# Patient Record
Sex: FEMALE | Race: OTHER | HISPANIC OR LATINO
[De-identification: names, ages, dates, MRNs, and addresses within clinical notes are randomized per-mention and may not be internally consistent; named-entity substitution may affect disease eponyms.]

---

## 2017-01-24 ENCOUNTER — RESULT REVIEW (OUTPATIENT)
Age: 27
End: 2017-01-24

## 2017-01-25 ENCOUNTER — APPOINTMENT (OUTPATIENT)
Dept: OBGYN | Facility: CLINIC | Age: 27
End: 2017-01-25

## 2017-01-25 ENCOUNTER — OUTPATIENT (OUTPATIENT)
Dept: OUTPATIENT SERVICES | Facility: HOSPITAL | Age: 27
LOS: 1 days | End: 2017-01-25
Payer: MEDICAID

## 2017-01-25 VITALS — WEIGHT: 183.5 LBS | DIASTOLIC BLOOD PRESSURE: 82 MMHG | SYSTOLIC BLOOD PRESSURE: 120 MMHG

## 2017-01-25 DIAGNOSIS — R11.0 NAUSEA: ICD-10-CM

## 2017-01-25 DIAGNOSIS — Z90.49 ACQUIRED ABSENCE OF OTHER SPECIFIED PARTS OF DIGESTIVE TRACT: Chronic | ICD-10-CM

## 2017-01-25 DIAGNOSIS — N76.0 ACUTE VAGINITIS: ICD-10-CM

## 2017-01-25 PROCEDURE — 88141 CYTOPATH C/V INTERPRET: CPT

## 2017-01-26 ENCOUNTER — LABORATORY RESULT (OUTPATIENT)
Age: 27
End: 2017-01-26

## 2017-01-30 ENCOUNTER — LABORATORY RESULT (OUTPATIENT)
Age: 27
End: 2017-01-30

## 2017-01-30 PROCEDURE — 88175 CYTOPATH C/V AUTO FLUID REDO: CPT

## 2017-01-30 PROCEDURE — 87624 HPV HI-RISK TYP POOLED RSLT: CPT

## 2017-01-30 PROCEDURE — 87086 URINE CULTURE/COLONY COUNT: CPT

## 2017-01-30 PROCEDURE — 87186 SC STD MICRODIL/AGAR DIL: CPT

## 2017-01-30 PROCEDURE — G0463: CPT

## 2017-02-13 ENCOUNTER — APPOINTMENT (OUTPATIENT)
Dept: OBGYN | Facility: CLINIC | Age: 27
End: 2017-02-13

## 2017-02-23 ENCOUNTER — EMERGENCY (EMERGENCY)
Facility: HOSPITAL | Age: 27
LOS: 1 days | Discharge: HOME | End: 2017-02-23
Attending: EMERGENCY MEDICINE | Admitting: EMERGENCY MEDICINE
Payer: SELF-PAY

## 2017-02-23 VITALS
TEMPERATURE: 100 F | OXYGEN SATURATION: 99 % | DIASTOLIC BLOOD PRESSURE: 64 MMHG | HEART RATE: 126 BPM | SYSTOLIC BLOOD PRESSURE: 94 MMHG | RESPIRATION RATE: 20 BRPM

## 2017-02-23 VITALS
DIASTOLIC BLOOD PRESSURE: 55 MMHG | HEART RATE: 95 BPM | OXYGEN SATURATION: 100 % | RESPIRATION RATE: 18 BRPM | TEMPERATURE: 99 F | SYSTOLIC BLOOD PRESSURE: 108 MMHG

## 2017-02-23 DIAGNOSIS — Z90.49 ACQUIRED ABSENCE OF OTHER SPECIFIED PARTS OF DIGESTIVE TRACT: Chronic | ICD-10-CM

## 2017-02-23 DIAGNOSIS — R10.13 EPIGASTRIC PAIN: ICD-10-CM

## 2017-02-23 LAB
ALBUMIN SERPL ELPH-MCNC: 4.6 G/DL — SIGNIFICANT CHANGE UP (ref 3.3–5)
ALP SERPL-CCNC: 157 U/L — HIGH (ref 40–120)
ALT FLD-CCNC: 34 U/L RC — SIGNIFICANT CHANGE UP (ref 10–45)
ANION GAP SERPL CALC-SCNC: 18 MMOL/L — HIGH (ref 5–17)
APPEARANCE UR: CLEAR — SIGNIFICANT CHANGE UP
AST SERPL-CCNC: 29 U/L — SIGNIFICANT CHANGE UP (ref 10–40)
BASOPHILS # BLD AUTO: 0 K/UL — SIGNIFICANT CHANGE UP (ref 0–0.2)
BASOPHILS NFR BLD AUTO: 0.1 % — SIGNIFICANT CHANGE UP (ref 0–2)
BILIRUB SERPL-MCNC: 0.7 MG/DL — SIGNIFICANT CHANGE UP (ref 0.2–1.2)
BILIRUB UR-MCNC: NEGATIVE — SIGNIFICANT CHANGE UP
BUN SERPL-MCNC: 9 MG/DL — SIGNIFICANT CHANGE UP (ref 7–23)
CALCIUM SERPL-MCNC: 9.4 MG/DL — SIGNIFICANT CHANGE UP (ref 8.4–10.5)
CHLORIDE SERPL-SCNC: 99 MMOL/L — SIGNIFICANT CHANGE UP (ref 96–108)
CO2 SERPL-SCNC: 23 MMOL/L — SIGNIFICANT CHANGE UP (ref 22–31)
COLOR SPEC: YELLOW — SIGNIFICANT CHANGE UP
CREAT SERPL-MCNC: 0.79 MG/DL — SIGNIFICANT CHANGE UP (ref 0.5–1.3)
DIFF PNL FLD: NEGATIVE — SIGNIFICANT CHANGE UP
EOSINOPHIL # BLD AUTO: 0 K/UL — SIGNIFICANT CHANGE UP (ref 0–0.5)
EOSINOPHIL NFR BLD AUTO: 0 % — SIGNIFICANT CHANGE UP (ref 0–6)
GAS PNL BLDV: SIGNIFICANT CHANGE UP
GLUCOSE SERPL-MCNC: 111 MG/DL — HIGH (ref 70–99)
GLUCOSE UR QL: NEGATIVE — SIGNIFICANT CHANGE UP
HCT VFR BLD CALC: 38 % — SIGNIFICANT CHANGE UP (ref 34.5–45)
HGB BLD-MCNC: 12.9 G/DL — SIGNIFICANT CHANGE UP (ref 11.5–15.5)
KETONES UR-MCNC: ABNORMAL
LEUKOCYTE ESTERASE UR-ACNC: NEGATIVE — SIGNIFICANT CHANGE UP
LIDOCAIN IGE QN: 18 U/L — SIGNIFICANT CHANGE UP (ref 7–60)
LYMPHOCYTES # BLD AUTO: 1.8 K/UL — SIGNIFICANT CHANGE UP (ref 1–3.3)
LYMPHOCYTES # BLD AUTO: 11.1 % — LOW (ref 13–44)
MCHC RBC-ENTMCNC: 26.7 PG — LOW (ref 27–34)
MCHC RBC-ENTMCNC: 33.9 GM/DL — SIGNIFICANT CHANGE UP (ref 32–36)
MCV RBC AUTO: 78.7 FL — LOW (ref 80–100)
MONOCYTES # BLD AUTO: 1 K/UL — HIGH (ref 0–0.9)
MONOCYTES NFR BLD AUTO: 6 % — SIGNIFICANT CHANGE UP (ref 2–14)
NEUTROPHILS # BLD AUTO: 13.5 K/UL — HIGH (ref 1.8–7.4)
NEUTROPHILS NFR BLD AUTO: 82.8 % — HIGH (ref 43–77)
NITRITE UR-MCNC: NEGATIVE — SIGNIFICANT CHANGE UP
PH UR: 8.5 — HIGH (ref 4.8–8)
PLATELET # BLD AUTO: 206 K/UL — SIGNIFICANT CHANGE UP (ref 150–400)
POTASSIUM SERPL-MCNC: 3.9 MMOL/L — SIGNIFICANT CHANGE UP (ref 3.5–5.3)
POTASSIUM SERPL-SCNC: 3.9 MMOL/L — SIGNIFICANT CHANGE UP (ref 3.5–5.3)
PROT SERPL-MCNC: 8.6 G/DL — HIGH (ref 6–8.3)
PROT UR-MCNC: 30 MG/DL
RBC # BLD: 4.83 M/UL — SIGNIFICANT CHANGE UP (ref 3.8–5.2)
RBC # FLD: 12.2 % — SIGNIFICANT CHANGE UP (ref 10.3–14.5)
SODIUM SERPL-SCNC: 140 MMOL/L — SIGNIFICANT CHANGE UP (ref 135–145)
SP GR SPEC: 1.02 — SIGNIFICANT CHANGE UP (ref 1.01–1.02)
UROBILINOGEN FLD QL: NEGATIVE — SIGNIFICANT CHANGE UP
WBC # BLD: 16.2 K/UL — HIGH (ref 3.8–10.5)
WBC # FLD AUTO: 16.2 K/UL — HIGH (ref 3.8–10.5)

## 2017-02-23 PROCEDURE — 99284 EMERGENCY DEPT VISIT MOD MDM: CPT | Mod: 25

## 2017-02-23 PROCEDURE — 82435 ASSAY OF BLOOD CHLORIDE: CPT

## 2017-02-23 PROCEDURE — 80053 COMPREHEN METABOLIC PANEL: CPT

## 2017-02-23 PROCEDURE — 84132 ASSAY OF SERUM POTASSIUM: CPT

## 2017-02-23 PROCEDURE — 84295 ASSAY OF SERUM SODIUM: CPT

## 2017-02-23 PROCEDURE — 81001 URINALYSIS AUTO W/SCOPE: CPT

## 2017-02-23 PROCEDURE — 96374 THER/PROPH/DIAG INJ IV PUSH: CPT

## 2017-02-23 PROCEDURE — 82803 BLOOD GASES ANY COMBINATION: CPT

## 2017-02-23 PROCEDURE — 82565 ASSAY OF CREATININE: CPT

## 2017-02-23 PROCEDURE — 87086 URINE CULTURE/COLONY COUNT: CPT

## 2017-02-23 PROCEDURE — 81025 URINE PREGNANCY TEST: CPT

## 2017-02-23 PROCEDURE — 96375 TX/PRO/DX INJ NEW DRUG ADDON: CPT

## 2017-02-23 PROCEDURE — 85014 HEMATOCRIT: CPT

## 2017-02-23 PROCEDURE — 82330 ASSAY OF CALCIUM: CPT

## 2017-02-23 PROCEDURE — 82947 ASSAY GLUCOSE BLOOD QUANT: CPT

## 2017-02-23 PROCEDURE — 85027 COMPLETE CBC AUTOMATED: CPT

## 2017-02-23 PROCEDURE — 83690 ASSAY OF LIPASE: CPT

## 2017-02-23 PROCEDURE — 99053 MED SERV 10PM-8AM 24 HR FAC: CPT

## 2017-02-23 PROCEDURE — 83605 ASSAY OF LACTIC ACID: CPT

## 2017-02-23 RX ORDER — ONDANSETRON 8 MG/1
4 TABLET, FILM COATED ORAL ONCE
Qty: 0 | Refills: 0 | Status: COMPLETED | OUTPATIENT
Start: 2017-02-23 | End: 2017-02-23

## 2017-02-23 RX ORDER — SODIUM CHLORIDE 9 MG/ML
1000 INJECTION INTRAMUSCULAR; INTRAVENOUS; SUBCUTANEOUS ONCE
Qty: 0 | Refills: 0 | Status: COMPLETED | OUTPATIENT
Start: 2017-02-23 | End: 2017-02-23

## 2017-02-23 RX ORDER — ACETAMINOPHEN 500 MG
1000 TABLET ORAL ONCE
Qty: 0 | Refills: 0 | Status: COMPLETED | OUTPATIENT
Start: 2017-02-23 | End: 2017-02-23

## 2017-02-23 RX ORDER — ONDANSETRON 8 MG/1
1 TABLET, FILM COATED ORAL
Qty: 12 | Refills: 0 | OUTPATIENT
Start: 2017-02-23 | End: 2017-02-26

## 2017-02-23 RX ADMIN — SODIUM CHLORIDE 2000 MILLILITER(S): 9 INJECTION INTRAMUSCULAR; INTRAVENOUS; SUBCUTANEOUS at 06:36

## 2017-02-23 RX ADMIN — ONDANSETRON 4 MILLIGRAM(S): 8 TABLET, FILM COATED ORAL at 06:36

## 2017-02-23 RX ADMIN — SODIUM CHLORIDE 2000 MILLILITER(S): 9 INJECTION INTRAMUSCULAR; INTRAVENOUS; SUBCUTANEOUS at 07:48

## 2017-02-23 RX ADMIN — Medication 400 MILLIGRAM(S): at 06:35

## 2017-02-23 NOTE — ED PROVIDER NOTE - ATTENDING CONTRIBUTION TO CARE
26yoF with fever, diarrhea, n/v. Some L flank pain.   On exam. no CVA ttp. No abdominal tenderness, no guarding, no rigidity. soft.   A: n/v/diarrhea - likely AGE. tachycardia, r/o Pyelo  Labs, fluids, recheck HR, encourage PO. UA, Uhcg.  Clinical concern for appendicitis or other intraabdominal pathology low as no abdominal ttp or peritoneal signs. Although these are still possible especially if early, risks of testing outweigh benefits at this time. Pt educated on return precautions. 26yoF with fever, diarrhea, n/v. Some L flank pain.   On exam. no CVA ttp. No abdominal tenderness, no guarding, no rigidity. soft.   A: n/v/diarrhea - likely AGE. tachycardia, r/o Pyelo  Labs, fluids, recheck HR, encourage PO. UA, Uhcg. reassess.  Clinical concern for appendicitis or other intraabdominal pathology low as no abdominal ttp or peritoneal signs. Although these are still possible especially if early, risks of testing outweigh benefits at this time. Pt educated on return precautions.

## 2017-02-23 NOTE — ED ADULT NURSE REASSESSMENT NOTE - NS ED NURSE REASSESS COMMENT FT1
0730: pt ambulated to bathroom without difficulty, urine clear yellow. sent as ordered. repeat temp 99. 5 oral, bp decreased to 90's systolic, 2nd liter ns initiated as ordered.

## 2017-02-23 NOTE — ED PROVIDER NOTE - CARE PLAN
Principal Discharge DX:	Gastroenteritis  Instructions for follow-up, activity and diet:	You were seen in the ER for nausea, vomiting. You must follow up with your primary physician in 24 to 48 hours. Return to the ER for any new or worsening signs/symptoms.   1) Take zofran as prescribed for nausea

## 2017-02-23 NOTE — ED ADULT NURSE NOTE - OBJECTIVE STATEMENT
Pt is a 25 yo female c.o V/D/N/fever x 1 day. Pt is reporting pain and numbness in her right fingers. Pt denies any CP or SOB. pt denies any cough. Pt reports generalized abdominal pain that radiates to her left flank area. Pt denies taking any medications due to currently breast feeding. Pt denies being around any sick contacts. Pt reports pmh of kidney/urine infections. Pt is a 25 yo female c.o V/D/N/fever x 1 day. Pt is reporting pain and numbness in her right fingers. Pt denies any CP or SOB. pt denies any cough. Pt reports fevers being as high as 100.7 at home and was taking Tylenol to tx fever. Pt reports generalized abdominal pain that radiates to her left flank area. Pt denies taking any medications due to currently breast feeding. Pt denies being around any sick contacts. Pt reports pmh of kidney/urine infections. Pt is a 27 yo female c.o V/D/N/fever x 1 day. Pt is reporting pain and numbness in her right fingers. Pt denies any CP or SOB. pt denies any cough. Pt reports fevers being as high as 100.7 at home and was taking Tylenol to tx fever. Pt reports generalized abdominal pain that radiates to her left flank area. Pt is tender on the LLQ, Pt denies taking any medications due to currently breast feeding. Pt denies being around any sick contacts. Pt reports pmh of kidney/urine infections.

## 2017-02-23 NOTE — ED PROVIDER NOTE - OBJECTIVE STATEMENT
26 year old female presents with 2 days of diarrhea and one day of fever, nausea and vomiting and epigastric pain with radiation to left flank.  Patient denies new medications or recent travel. Infant at home with ear infection.   Patient reports tingling in first 3 fingers in both hands for 6 hours.

## 2017-02-23 NOTE — ED PROVIDER NOTE - PLAN OF CARE
You were seen in the ER for nausea, vomiting. You must follow up with your primary physician in 24 to 48 hours. Return to the ER for any new or worsening signs/symptoms.   1) Take zofran as prescribed for nausea

## 2017-02-23 NOTE — ED PROVIDER NOTE - MEDICAL DECISION MAKING DETAILS
26F presents with fever, nausea vomiting diarrhea and abdominal pain   suspect viral gastroenteritis vs uti/pyelo  plan cbc cmp vbg lipase ua   zofran ivf tylenol reassess 26F presents with fever, nausea vomiting diarrhea and abdominal pain   suspect viral gastroenteritis vs uti/pyelo  Christiano Aceves DO; see attending attestation   plan cbc cmp vbg lipase ua   zofran ivf tylenol reassess

## 2017-02-24 LAB
CULTURE RESULTS: SIGNIFICANT CHANGE UP
SPECIMEN SOURCE: SIGNIFICANT CHANGE UP

## 2017-02-27 ENCOUNTER — APPOINTMENT (OUTPATIENT)
Dept: OBGYN | Facility: CLINIC | Age: 27
End: 2017-02-27

## 2017-04-24 ENCOUNTER — INPATIENT (INPATIENT)
Facility: HOSPITAL | Age: 27
LOS: 2 days | Discharge: HOME | DRG: 444 | End: 2017-04-27
Attending: INTERNAL MEDICINE | Admitting: INTERNAL MEDICINE
Payer: MEDICAID

## 2017-04-24 VITALS
TEMPERATURE: 98 F | DIASTOLIC BLOOD PRESSURE: 77 MMHG | SYSTOLIC BLOOD PRESSURE: 119 MMHG | HEART RATE: 84 BPM | RESPIRATION RATE: 18 BRPM | OXYGEN SATURATION: 99 %

## 2017-04-24 DIAGNOSIS — R10.10 UPPER ABDOMINAL PAIN, UNSPECIFIED: ICD-10-CM

## 2017-04-24 DIAGNOSIS — Z90.49 ACQUIRED ABSENCE OF OTHER SPECIFIED PARTS OF DIGESTIVE TRACT: Chronic | ICD-10-CM

## 2017-04-24 LAB
ALBUMIN SERPL ELPH-MCNC: 4.8 G/DL — SIGNIFICANT CHANGE UP (ref 3.3–5)
ALP SERPL-CCNC: 207 U/L — HIGH (ref 40–120)
ALT FLD-CCNC: 92 U/L RC — HIGH (ref 10–45)
ANION GAP SERPL CALC-SCNC: 16 MMOL/L — SIGNIFICANT CHANGE UP (ref 5–17)
APPEARANCE UR: CLEAR — SIGNIFICANT CHANGE UP
AST SERPL-CCNC: 141 U/L — HIGH (ref 10–40)
BASOPHILS # BLD AUTO: 0 K/UL — SIGNIFICANT CHANGE UP (ref 0–0.2)
BASOPHILS NFR BLD AUTO: 0.2 % — SIGNIFICANT CHANGE UP (ref 0–2)
BILIRUB SERPL-MCNC: 0.8 MG/DL — SIGNIFICANT CHANGE UP (ref 0.2–1.2)
BILIRUB UR-MCNC: NEGATIVE — SIGNIFICANT CHANGE UP
BUN SERPL-MCNC: 13 MG/DL — SIGNIFICANT CHANGE UP (ref 7–23)
CALCIUM SERPL-MCNC: 9.3 MG/DL — SIGNIFICANT CHANGE UP (ref 8.4–10.5)
CHLORIDE SERPL-SCNC: 102 MMOL/L — SIGNIFICANT CHANGE UP (ref 96–108)
CO2 SERPL-SCNC: 20 MMOL/L — LOW (ref 22–31)
COLOR SPEC: YELLOW — SIGNIFICANT CHANGE UP
CREAT SERPL-MCNC: 0.65 MG/DL — SIGNIFICANT CHANGE UP (ref 0.5–1.3)
DIFF PNL FLD: ABNORMAL
EOSINOPHIL # BLD AUTO: 0 K/UL — SIGNIFICANT CHANGE UP (ref 0–0.5)
EOSINOPHIL NFR BLD AUTO: 0.4 % — SIGNIFICANT CHANGE UP (ref 0–6)
EPI CELLS # UR: SIGNIFICANT CHANGE UP /HPF
GAS PNL BLDV: SIGNIFICANT CHANGE UP
GLUCOSE SERPL-MCNC: 106 MG/DL — HIGH (ref 70–99)
GLUCOSE UR QL: NEGATIVE — SIGNIFICANT CHANGE UP
HCT VFR BLD CALC: 37.2 % — SIGNIFICANT CHANGE UP (ref 34.5–45)
HGB BLD-MCNC: 13.1 G/DL — SIGNIFICANT CHANGE UP (ref 11.5–15.5)
KETONES UR-MCNC: NEGATIVE — SIGNIFICANT CHANGE UP
LEUKOCYTE ESTERASE UR-ACNC: ABNORMAL
LYMPHOCYTES # BLD AUTO: 1 K/UL — SIGNIFICANT CHANGE UP (ref 1–3.3)
LYMPHOCYTES # BLD AUTO: 11.7 % — LOW (ref 13–44)
MCHC RBC-ENTMCNC: 28 PG — SIGNIFICANT CHANGE UP (ref 27–34)
MCHC RBC-ENTMCNC: 35.3 GM/DL — SIGNIFICANT CHANGE UP (ref 32–36)
MCV RBC AUTO: 79.4 FL — LOW (ref 80–100)
MONOCYTES # BLD AUTO: 0.6 K/UL — SIGNIFICANT CHANGE UP (ref 0–0.9)
MONOCYTES NFR BLD AUTO: 6.6 % — SIGNIFICANT CHANGE UP (ref 2–14)
NEUTROPHILS # BLD AUTO: 7.1 K/UL — SIGNIFICANT CHANGE UP (ref 1.8–7.4)
NEUTROPHILS NFR BLD AUTO: 81.2 % — HIGH (ref 43–77)
NITRITE UR-MCNC: NEGATIVE — SIGNIFICANT CHANGE UP
PH UR: 5.5 — SIGNIFICANT CHANGE UP (ref 5–8)
PLATELET # BLD AUTO: 223 K/UL — SIGNIFICANT CHANGE UP (ref 150–400)
POTASSIUM SERPL-MCNC: 3.8 MMOL/L — SIGNIFICANT CHANGE UP (ref 3.5–5.3)
POTASSIUM SERPL-SCNC: 3.8 MMOL/L — SIGNIFICANT CHANGE UP (ref 3.5–5.3)
PROT SERPL-MCNC: 8.3 G/DL — SIGNIFICANT CHANGE UP (ref 6–8.3)
PROT UR-MCNC: 30 MG/DL
RBC # BLD: 4.68 M/UL — SIGNIFICANT CHANGE UP (ref 3.8–5.2)
RBC # FLD: 12.7 % — SIGNIFICANT CHANGE UP (ref 10.3–14.5)
SODIUM SERPL-SCNC: 138 MMOL/L — SIGNIFICANT CHANGE UP (ref 135–145)
SP GR SPEC: 1.03 — HIGH (ref 1.01–1.02)
UROBILINOGEN FLD QL: NEGATIVE — SIGNIFICANT CHANGE UP
WBC # BLD: 8.8 K/UL — SIGNIFICANT CHANGE UP (ref 3.8–10.5)
WBC # FLD AUTO: 8.8 K/UL — SIGNIFICANT CHANGE UP (ref 3.8–10.5)
WBC UR QL: SIGNIFICANT CHANGE UP /HPF (ref 0–5)

## 2017-04-24 PROCEDURE — 93010 ELECTROCARDIOGRAM REPORT: CPT

## 2017-04-24 PROCEDURE — 99285 EMERGENCY DEPT VISIT HI MDM: CPT | Mod: 25

## 2017-04-24 RX ORDER — ONDANSETRON 8 MG/1
4 TABLET, FILM COATED ORAL ONCE
Qty: 0 | Refills: 0 | Status: COMPLETED | OUTPATIENT
Start: 2017-04-24 | End: 2017-04-24

## 2017-04-24 RX ORDER — FAMOTIDINE 10 MG/ML
20 INJECTION INTRAVENOUS ONCE
Qty: 0 | Refills: 0 | Status: COMPLETED | OUTPATIENT
Start: 2017-04-24 | End: 2017-04-24

## 2017-04-24 RX ORDER — SODIUM CHLORIDE 9 MG/ML
1000 INJECTION INTRAMUSCULAR; INTRAVENOUS; SUBCUTANEOUS ONCE
Qty: 0 | Refills: 0 | Status: COMPLETED | OUTPATIENT
Start: 2017-04-24 | End: 2017-04-24

## 2017-04-24 RX ADMIN — SODIUM CHLORIDE 2000 MILLILITER(S): 9 INJECTION INTRAMUSCULAR; INTRAVENOUS; SUBCUTANEOUS at 23:35

## 2017-04-24 RX ADMIN — ONDANSETRON 4 MILLIGRAM(S): 8 TABLET, FILM COATED ORAL at 21:39

## 2017-04-24 RX ADMIN — FAMOTIDINE 20 MILLIGRAM(S): 10 INJECTION INTRAVENOUS at 21:39

## 2017-04-24 RX ADMIN — SODIUM CHLORIDE 2000 MILLILITER(S): 9 INJECTION INTRAMUSCULAR; INTRAVENOUS; SUBCUTANEOUS at 21:39

## 2017-04-24 NOTE — ED PROVIDER NOTE - OBJECTIVE STATEMENT
26F history of UTI, PID presents with epigastric pain that began 1 hr prior to arrival with nausea/vomiting and diarrhea x6 (both nonbloody). Cramping, constant, moderate epigastric discomfort. Denies fevers/chills, headache, arthralgias, chest pain, dysuria, travel, sick contact or possible food poisoning. LMP was 6 months ago; patient states that she has IUD and this is normal.

## 2017-04-24 NOTE — ED PROVIDER NOTE - PROGRESS NOTE DETAILS
Spoke with GI. Would like patient to receive EKG and then NPO after midnight for likely ERCP in the AM. Satnam Leos, Resident. Lori Posada MD Patient was noted to have elevated LFT's normal bili, cannot r/o retained GS, will admit fo further work up

## 2017-04-24 NOTE — ED ADULT NURSE REASSESSMENT NOTE - NS ED NURSE REASSESS COMMENT FT1
Patient provided with crackers and water at this time; patient tolerating PO intake at this time; a&ox3; safety and comfort measures provided

## 2017-04-24 NOTE — ED PROVIDER NOTE - ATTENDING CONTRIBUTION TO CARE
Lori Posada MD  26 F presents with epigastric discomfort a/w nausea/vomiting and diarrhea, prior history of cholecystectomy; on exam, upper quadrant tenderness, no rebound, no guarding, r/o retained gall stones, r/o gastritis, r/o UTI. Plan: blood, urine, IV tylenol reassess.

## 2017-04-24 NOTE — ED ADULT NURSE REASSESSMENT NOTE - NS ED NURSE REASSESS COMMENT FT1
Patient states partial relief of abdominal pain; patient appears to be resting comfortably in stretcher; family at bedside; a&ox3

## 2017-04-24 NOTE — ED ADULT NURSE NOTE - OBJECTIVE STATEMENT
27 y/o female presenting to the ED complaining of abdominal pain and vomiting x 9 hours 25 y/o female presenting to the ED complaining of abdominal pain and vomiting x 9 hours; unknown sick contacts; Patient states ate a bagel this morning and then began vomiting, about 5 episodes and several episodes of diarrhea; Patient denies any blood in vomit or diarrhea; Patient states upper abdominal pain radiating to lower bilateral flank regions; Abdomen soft, nontender upon palpation; Patient denies any urinary s/s, SOB, n/v/d, dizziness, fevers, chest pain; Neuro grossly intact; steady gait noted upon walking in; a&ox3; safety and comfort measures provided; family at bedside

## 2017-04-24 NOTE — ED PROVIDER NOTE - MEDICAL DECISION MAKING DETAILS
26F presents with epigastric discomfort a/w nausea/vomiting and diarrhea. Symptoms suggestive of gastritis, but will eval for possible UTI. Obtaining blood, urine, and giving symptom control. Satnam Leos, Resident.

## 2017-04-25 DIAGNOSIS — Z29.9 ENCOUNTER FOR PROPHYLACTIC MEASURES, UNSPECIFIED: ICD-10-CM

## 2017-04-25 DIAGNOSIS — R10.10 UPPER ABDOMINAL PAIN, UNSPECIFIED: ICD-10-CM

## 2017-04-25 LAB
ALBUMIN SERPL ELPH-MCNC: 4.1 G/DL — SIGNIFICANT CHANGE UP (ref 3.3–5)
ALP SERPL-CCNC: 264 U/L — HIGH (ref 40–120)
ALT FLD-CCNC: 354 U/L RC — HIGH (ref 10–45)
AMYLASE P1 CFR SERPL: 104 U/L — SIGNIFICANT CHANGE UP (ref 25–125)
ANION GAP SERPL CALC-SCNC: 13 MMOL/L — SIGNIFICANT CHANGE UP (ref 5–17)
APTT BLD: 29 SEC — SIGNIFICANT CHANGE UP (ref 27.5–37.4)
AST SERPL-CCNC: 456 U/L — HIGH (ref 10–40)
BASOPHILS # BLD AUTO: 0 K/UL — SIGNIFICANT CHANGE UP (ref 0–0.2)
BASOPHILS NFR BLD AUTO: 0.4 % — SIGNIFICANT CHANGE UP (ref 0–2)
BILIRUB SERPL-MCNC: 1.6 MG/DL — HIGH (ref 0.2–1.2)
BUN SERPL-MCNC: 9 MG/DL — SIGNIFICANT CHANGE UP (ref 7–23)
CALCIUM SERPL-MCNC: 8.7 MG/DL — SIGNIFICANT CHANGE UP (ref 8.4–10.5)
CHLORIDE SERPL-SCNC: 109 MMOL/L — HIGH (ref 96–108)
CO2 SERPL-SCNC: 21 MMOL/L — LOW (ref 22–31)
CREAT SERPL-MCNC: 0.5 MG/DL — SIGNIFICANT CHANGE UP (ref 0.5–1.3)
EOSINOPHIL # BLD AUTO: 0 K/UL — SIGNIFICANT CHANGE UP (ref 0–0.5)
EOSINOPHIL NFR BLD AUTO: 0.7 % — SIGNIFICANT CHANGE UP (ref 0–6)
GGT SERPL-CCNC: 288 U/L — HIGH (ref 8–40)
GLUCOSE SERPL-MCNC: 95 MG/DL — SIGNIFICANT CHANGE UP (ref 70–99)
HAV IGM SER-ACNC: SIGNIFICANT CHANGE UP
HBV CORE IGM SER-ACNC: SIGNIFICANT CHANGE UP
HBV SURFACE AG SER-ACNC: SIGNIFICANT CHANGE UP
HCT VFR BLD CALC: 35.7 % — SIGNIFICANT CHANGE UP (ref 34.5–45)
HCV AB S/CO SERPL IA: 0.12 S/CO — SIGNIFICANT CHANGE UP
HCV AB SERPL-IMP: SIGNIFICANT CHANGE UP
HGB BLD-MCNC: 11.8 G/DL — SIGNIFICANT CHANGE UP (ref 11.5–15.5)
INR BLD: 1.27 RATIO — HIGH (ref 0.88–1.16)
LIDOCAIN IGE QN: 192 U/L — HIGH (ref 7–60)
LYMPHOCYTES # BLD AUTO: 0.7 K/UL — LOW (ref 1–3.3)
LYMPHOCYTES # BLD AUTO: 16.2 % — SIGNIFICANT CHANGE UP (ref 13–44)
MAGNESIUM SERPL-MCNC: 1.8 MG/DL — SIGNIFICANT CHANGE UP (ref 1.6–2.6)
MCHC RBC-ENTMCNC: 26.4 PG — LOW (ref 27–34)
MCHC RBC-ENTMCNC: 33.1 GM/DL — SIGNIFICANT CHANGE UP (ref 32–36)
MCV RBC AUTO: 79.6 FL — LOW (ref 80–100)
MONOCYTES # BLD AUTO: 0.3 K/UL — SIGNIFICANT CHANGE UP (ref 0–0.9)
MONOCYTES NFR BLD AUTO: 6.6 % — SIGNIFICANT CHANGE UP (ref 2–14)
NEUTROPHILS # BLD AUTO: 3.3 K/UL — SIGNIFICANT CHANGE UP (ref 1.8–7.4)
NEUTROPHILS NFR BLD AUTO: 76.1 % — SIGNIFICANT CHANGE UP (ref 43–77)
PCP SPEC-MCNC: SIGNIFICANT CHANGE UP
PHOSPHATE SERPL-MCNC: 2.6 MG/DL — SIGNIFICANT CHANGE UP (ref 2.5–4.5)
PLATELET # BLD AUTO: 195 K/UL — SIGNIFICANT CHANGE UP (ref 150–400)
POTASSIUM SERPL-MCNC: 3.7 MMOL/L — SIGNIFICANT CHANGE UP (ref 3.5–5.3)
POTASSIUM SERPL-SCNC: 3.7 MMOL/L — SIGNIFICANT CHANGE UP (ref 3.5–5.3)
PROT SERPL-MCNC: 7.2 G/DL — SIGNIFICANT CHANGE UP (ref 6–8.3)
PROTHROM AB SERPL-ACNC: 13.9 SEC — HIGH (ref 9.8–12.7)
RBC # BLD: 4.48 M/UL — SIGNIFICANT CHANGE UP (ref 3.8–5.2)
RBC # FLD: 12.5 % — SIGNIFICANT CHANGE UP (ref 10.3–14.5)
SODIUM SERPL-SCNC: 143 MMOL/L — SIGNIFICANT CHANGE UP (ref 135–145)
WBC # BLD: 4.3 K/UL — SIGNIFICANT CHANGE UP (ref 3.8–10.5)
WBC # FLD AUTO: 4.3 K/UL — SIGNIFICANT CHANGE UP (ref 3.8–10.5)

## 2017-04-25 PROCEDURE — 76705 ECHO EXAM OF ABDOMEN: CPT | Mod: 26

## 2017-04-25 PROCEDURE — 99223 1ST HOSP IP/OBS HIGH 75: CPT | Mod: AI,GC

## 2017-04-25 RX ORDER — KETOROLAC TROMETHAMINE 30 MG/ML
30 SYRINGE (ML) INJECTION ONCE
Qty: 0 | Refills: 0 | Status: DISCONTINUED | OUTPATIENT
Start: 2017-04-25 | End: 2017-04-25

## 2017-04-25 RX ORDER — ACETAMINOPHEN 500 MG
650 TABLET ORAL EVERY 6 HOURS
Qty: 0 | Refills: 0 | Status: DISCONTINUED | OUTPATIENT
Start: 2017-04-25 | End: 2017-04-27

## 2017-04-25 RX ORDER — ACETAMINOPHEN 500 MG
1000 TABLET ORAL ONCE
Qty: 0 | Refills: 0 | Status: DISCONTINUED | OUTPATIENT
Start: 2017-04-25 | End: 2017-04-25

## 2017-04-25 RX ORDER — SODIUM CHLORIDE 9 MG/ML
1000 INJECTION, SOLUTION INTRAVENOUS
Qty: 0 | Refills: 0 | Status: DISCONTINUED | OUTPATIENT
Start: 2017-04-25 | End: 2017-04-27

## 2017-04-25 RX ADMIN — Medication 650 MILLIGRAM(S): at 22:00

## 2017-04-25 RX ADMIN — Medication 650 MILLIGRAM(S): at 09:48

## 2017-04-25 RX ADMIN — Medication 650 MILLIGRAM(S): at 21:30

## 2017-04-25 RX ADMIN — SODIUM CHLORIDE 75 MILLILITER(S): 9 INJECTION, SOLUTION INTRAVENOUS at 14:48

## 2017-04-25 RX ADMIN — Medication 30 MILLIGRAM(S): at 02:03

## 2017-04-25 RX ADMIN — Medication 30 MILLIGRAM(S): at 01:33

## 2017-04-25 RX ADMIN — Medication 650 MILLIGRAM(S): at 09:18

## 2017-04-25 NOTE — H&P ADULT. - PROBLEM SELECTOR PLAN 1
-mid epigastric pain with N/V/D likely 2/2 to viral gastroenteritis vs PUD vs choledocholithiasis  -pt feels improved with IVF, Toradol.  -Seen by GI - planning for ERCP.  -NPO for now. -mid epigastric pain with N/V/D likely 2/2 to viral gastroenteritis vs PUD vs choledocholithiasis  -RUQ US shows dilated CBD w/o stone; s/p cholecystectomy  -pt feels improved with IVF, Toradol.  -Seen by GI - planning for ERCP.  -NPO for now. -mid epigastric pain with N/V/D likely 2/2 to viral gastroenteritis vs PUD vs choledocholithiasis, viral/autoimmune hepatitis  -RUQ US shows dilated CBD w/o stone; s/p cholecystectomy  -pt feels improved with IVF, Toradol.  -will check hep panel, CHASTITY, ASMA, amylase, lipase.  -Seen by GI - planning for ERCP.  -NPO for now.

## 2017-04-25 NOTE — H&P ADULT. - RS GEN PE MLT RESP DETAILS PC
breath sounds equal/good air movement/airway patent/clear to auscultation bilaterally/no wheezes/no rales/no rhonchi/respirations non-labored

## 2017-04-25 NOTE — H&P ADULT. - RADIOLOGY RESULTS AND INTERPRETATION
RUQ US shows dilated CBD w/o stone; s/p cholecystectomy. RUQ US personally reviewed shows dilated CBD w/o stone; s/p cholecystectomy.

## 2017-04-25 NOTE — H&P ADULT. - HISTORY OF PRESENT ILLNESS
26F history of UTI, PID presents with epigastric pain that began 1 hr prior to arrival with nausea, NBNB vomiting and diarrhea x6 (non bloody). Pt reports the pain is mid epigastric, cramping, constant, moderate.  She tried Tylenol with minimal relief. Pt had similar episode in Feb and presented to ED where she was treated for viral gastroenteritis is discharged to follow up with PMD. Pt has no hx of GERD, PUD, prolonged NSAIDs use. Denies fevers/chills, headache, arthralgias, chest pain, dysuria, travel, sick contact or possible food poisoning. LMP was 6 months ago; patient states that she has IUD and this is normal.  ED course: T 97.7, HR 84, /77, RR 18, SpO2 99. Initial labs show CBC and BMP wnl. , , ALT 92. RUQ US shows dilated CBD w/o stone; s/p cholecystectomy.

## 2017-04-25 NOTE — H&P ADULT. - ATTENDING COMMENTS
26F history of UTI, PID presents with epigastric pain, N/V, labs with elevated transaminases and alk phos, US shows dilated CBD and post cholecystectomy changes, prior hep panel neg, last HIV test neg, no history of etoh , will obtain amylase and lipase, utox updated hep panels w/ CHASTITY and AsMA , GI to evaluate patient during day time.

## 2017-04-25 NOTE — H&P ADULT. - ASSESSMENT
26F history of UTI, PID presents with epigastric pain that began 1 hr prior to arrival with nausea, NBNB vomiting and diarrhea.

## 2017-04-26 ENCOUNTER — TRANSCRIPTION ENCOUNTER (OUTPATIENT)
Age: 27
End: 2017-04-26

## 2017-04-26 LAB
ALBUMIN SERPL ELPH-MCNC: 3.8 G/DL — SIGNIFICANT CHANGE UP (ref 3.3–5)
ALP SERPL-CCNC: 306 U/L — HIGH (ref 40–120)
ALT FLD-CCNC: 255 U/L RC — HIGH (ref 10–45)
ANA TITR SER: NEGATIVE — SIGNIFICANT CHANGE UP
ANION GAP SERPL CALC-SCNC: 14 MMOL/L — SIGNIFICANT CHANGE UP (ref 5–17)
AST SERPL-CCNC: 134 U/L — HIGH (ref 10–40)
BASOPHILS # BLD AUTO: 0 K/UL — SIGNIFICANT CHANGE UP (ref 0–0.2)
BASOPHILS NFR BLD AUTO: 0.2 % — SIGNIFICANT CHANGE UP (ref 0–2)
BILIRUB SERPL-MCNC: 0.7 MG/DL — SIGNIFICANT CHANGE UP (ref 0.2–1.2)
BUN SERPL-MCNC: 6 MG/DL — LOW (ref 7–23)
C DIFF BY PCR RESULT: SIGNIFICANT CHANGE UP
C DIFF TOX GENS STL QL NAA+PROBE: SIGNIFICANT CHANGE UP
CALCIUM SERPL-MCNC: 8.9 MG/DL — SIGNIFICANT CHANGE UP (ref 8.4–10.5)
CHLORIDE SERPL-SCNC: 107 MMOL/L — SIGNIFICANT CHANGE UP (ref 96–108)
CO2 SERPL-SCNC: 22 MMOL/L — SIGNIFICANT CHANGE UP (ref 22–31)
CREAT SERPL-MCNC: 0.55 MG/DL — SIGNIFICANT CHANGE UP (ref 0.5–1.3)
CULTURE RESULTS: SIGNIFICANT CHANGE UP
EOSINOPHIL # BLD AUTO: 0.1 K/UL — SIGNIFICANT CHANGE UP (ref 0–0.5)
EOSINOPHIL NFR BLD AUTO: 2.3 % — SIGNIFICANT CHANGE UP (ref 0–6)
GLUCOSE SERPL-MCNC: 89 MG/DL — SIGNIFICANT CHANGE UP (ref 70–99)
HCT VFR BLD CALC: 34.2 % — LOW (ref 34.5–45)
HGB BLD-MCNC: 11.9 G/DL — SIGNIFICANT CHANGE UP (ref 11.5–15.5)
LYMPHOCYTES # BLD AUTO: 1.3 K/UL — SIGNIFICANT CHANGE UP (ref 1–3.3)
LYMPHOCYTES # BLD AUTO: 33.5 % — SIGNIFICANT CHANGE UP (ref 13–44)
MAGNESIUM SERPL-MCNC: 1.8 MG/DL — SIGNIFICANT CHANGE UP (ref 1.6–2.6)
MCHC RBC-ENTMCNC: 28.3 PG — SIGNIFICANT CHANGE UP (ref 27–34)
MCHC RBC-ENTMCNC: 34.7 GM/DL — SIGNIFICANT CHANGE UP (ref 32–36)
MCV RBC AUTO: 81.6 FL — SIGNIFICANT CHANGE UP (ref 80–100)
MONOCYTES # BLD AUTO: 0.4 K/UL — SIGNIFICANT CHANGE UP (ref 0–0.9)
MONOCYTES NFR BLD AUTO: 11.2 % — SIGNIFICANT CHANGE UP (ref 2–14)
NEUTROPHILS # BLD AUTO: 2 K/UL — SIGNIFICANT CHANGE UP (ref 1.8–7.4)
NEUTROPHILS NFR BLD AUTO: 52.8 % — SIGNIFICANT CHANGE UP (ref 43–77)
PHOSPHATE SERPL-MCNC: 2.4 MG/DL — LOW (ref 2.5–4.5)
PLATELET # BLD AUTO: 196 K/UL — SIGNIFICANT CHANGE UP (ref 150–400)
POTASSIUM SERPL-MCNC: 3.9 MMOL/L — SIGNIFICANT CHANGE UP (ref 3.5–5.3)
POTASSIUM SERPL-SCNC: 3.9 MMOL/L — SIGNIFICANT CHANGE UP (ref 3.5–5.3)
PROT SERPL-MCNC: 6.9 G/DL — SIGNIFICANT CHANGE UP (ref 6–8.3)
RBC # BLD: 4.19 M/UL — SIGNIFICANT CHANGE UP (ref 3.8–5.2)
RBC # FLD: 12.6 % — SIGNIFICANT CHANGE UP (ref 10.3–14.5)
SMOOTH MUSCLE AB SER-ACNC: SIGNIFICANT CHANGE UP
SODIUM SERPL-SCNC: 143 MMOL/L — SIGNIFICANT CHANGE UP (ref 135–145)
SPECIMEN SOURCE: SIGNIFICANT CHANGE UP
WBC # BLD: 3.8 K/UL — SIGNIFICANT CHANGE UP (ref 3.8–10.5)
WBC # FLD AUTO: 3.8 K/UL — SIGNIFICANT CHANGE UP (ref 3.8–10.5)

## 2017-04-26 PROCEDURE — 99232 SBSQ HOSP IP/OBS MODERATE 35: CPT | Mod: GC

## 2017-04-26 PROCEDURE — 74183 MRI ABD W/O CNTR FLWD CNTR: CPT | Mod: 26

## 2017-04-26 RX ORDER — ACETAMINOPHEN 500 MG
2 TABLET ORAL
Qty: 0 | Refills: 0 | COMMUNITY
Start: 2017-04-26

## 2017-04-26 RX ORDER — SODIUM,POTASSIUM PHOSPHATES 278-250MG
1 POWDER IN PACKET (EA) ORAL
Qty: 0 | Refills: 0 | Status: COMPLETED | OUTPATIENT
Start: 2017-04-26 | End: 2017-04-26

## 2017-04-26 RX ADMIN — Medication 1 TABLET(S): at 18:02

## 2017-04-26 RX ADMIN — Medication 1 TABLET(S): at 12:04

## 2017-04-26 RX ADMIN — SODIUM CHLORIDE 75 MILLILITER(S): 9 INJECTION, SOLUTION INTRAVENOUS at 05:27

## 2017-04-26 RX ADMIN — Medication 1 TABLET(S): at 22:05

## 2017-04-26 NOTE — DISCHARGE NOTE ADULT - PATIENT PORTAL LINK FT
“You can access the [facility] Patient Portal, offered by [facility], by registering with the following website: [email or web address]”

## 2017-04-26 NOTE — DISCHARGE NOTE ADULT - PROVIDER TOKENS
FREE:[LAST:[.],PHONE:[phone]],FAX:[(   )    -],ADDRESS:[[address] [place] [address]]]

## 2017-04-26 NOTE — DISCHARGE NOTE ADULT - PLAN OF CARE
Resolution of abdominal pain You were found to have inflammation of the pancreas due to a stone that obstructed part of the normal drainage of bile from the liver to the intestines. Please continue to advance your diet as tolerated. If you experience worsening abdominal pain or fever, please return to the hospital for urgent evaluation. You should continue to waste your breast milk for 24 hours after discharge from the hospital. This is to make sure you don't accumulate the medications/contrast you were administered while in the hospital You were found to have inflammation of the pancreas due to a stone that obstructed part of the normal drainage of bile from the liver to the intestines. Please continue to advance your diet as tolerated. If you experience worsening abdominal pain or fever, please return to the hospital for urgent evaluation. You should continue to waste your breast milk for 24 hours after discharge from the hospital. This is to make sure you don't accumulate the medications/contrast you were administered while in the hospital. You were found to have inflammation of the pancreas due to a stone that obstructed part of the normal drainage of bile from the liver to the intestines. You had an MRI and an endoscopy to further evaluate this issue; both studies were normal. Please continue to advance your diet as tolerated. If you experience worsening abdominal pain or fever, please return to the hospital for urgent evaluation. You should continue to waste your breast milk for 24 hours after discharge from the hospital. This is to make sure you don't accumulate the medications/contrast you were administered while in the hospital. Please follow up with your primary care doctor and gastroenterology after discharge.

## 2017-04-26 NOTE — DISCHARGE NOTE ADULT - CARE PROVIDER_API CALL
.,   [address] [facility]  [address], [place] [address]  Phone: [phone]  Fax: (   )    -

## 2017-04-26 NOTE — DISCHARGE NOTE ADULT - HOSPITAL COURSE
26 year old female with history of cholecystitis s/p cholecystectomy, PID, IUD presents with one day of nausea, vomiting, diarrhea and epigastric pain. She was found to have pancreatitis with lipase of 192, along with elevations of T bili, AST/ALT/Alk phos indicating biliary obstruction. She was evaluated by GI upon arrival to the ED who recommended MRCP.  She was started on IVF and given a clear liquid diet was was tolerated. By first hospital day after admission, she was noted to have improvement of the LFTs as well as normalization of her T.bili. There was improvement of her epigastric pain. MRCP showed_________________________________________________________    She was discharged in stable condition with instruction to return to the hospital should she experience fever or worsening abdominal pain. She was discharged in stable condition. 26 year old female with history of cholecystitis s/p cholecystectomy, PID, IUD presents with one day of nausea, vomiting, diarrhea and epigastric pain. She was found to have pancreatitis with lipase of 192, along with elevations of T bili, AST/ALT/Alk phos indicating biliary obstruction. She was evaluated by GI upon arrival to the ED who recommended MRCP.  She was started on IVF and given a clear liquid diet was was tolerated. By first hospital day after admission, she was noted to have improvement of the LFTs as well as normalization of her T.bili. There was improvement of her epigastric pain. MRCP showed no abnormalities and no visible stones. She tolerated a full diet after the MRCP and symptoms continued to improve.    She was discharged in stable condition with instruction to return to the hospital should she experience fever or worsening abdominal pain. She was discharged in stable condition. 26 year old female with history of cholecystitis s/p cholecystectomy, PID, IUD presents with one day of nausea, vomiting, diarrhea and epigastric pain. She was found to have pancreatitis with lipase of 192, along with elevations of T bili, AST/ALT/Alk phos indicating biliary obstruction. She was evaluated by GI upon arrival to the ED who recommended MRCP. She was started on IVF and given a clear liquid diet was was tolerated. By first hospital day after admission, she was noted to have improvement of the LFTs as well as normalization of her T.bili. There was improvement of her epigastric pain. MRCP showed no abnormalities and no visible stones. She tolerated a full diet after the MRCP and symptoms continued to improve. Patient was taken for endoscopy by GI to further evaluate her symptoms; this test showed no abnormalities. She was discharged in stable condition with instruction to return to the hospital should she experience fever or worsening abdominal pain. 26 year old female with history of cholecystitis s/p cholecystectomy, PID, IUD presents with one day of nausea, vomiting, diarrhea and epigastric pain. She was found to have pancreatitis with lipase of 192, along with elevations of T bili, AST/ALT/Alk phos indicating biliary obstruction. She was evaluated by GI upon arrival to the ED who recommended MRCP. She was started on IVF and given a clear liquid diet was was tolerated. By first hospital day after admission, she was noted to have improvement of the LFTs as well as normalization of her T.bili. There was improvement of her epigastric pain. MRCP showed no abnormalities and no visible stones. She tolerated a full diet after the MRCP and symptoms continued to improve. Patient was taken for endoscopy by GI to further evaluate her symptoms; this test showed no abnormalities. She was discharged in stable condition with instruction to return to the hospital should she experience fever or worsening abdominal pain.  Attending addendum 4/28/14 at 10.30 am-  I called the patient- she is doing all right. No Nausea vomiting. She would follow up in medicine clinic in Monday and repeat CMP.

## 2017-04-26 NOTE — DISCHARGE NOTE ADULT - MEDICATION SUMMARY - MEDICATIONS TO TAKE
I will START or STAY ON the medications listed below when I get home from the hospital:    Tylenol 325 mg oral tablet  -- 2 tab(s) by mouth every 6 hours, as needed for pain  -- Indication: For Pain

## 2017-04-26 NOTE — DISCHARGE NOTE ADULT - CARE PLAN
Principal Discharge DX:	Pancreatitis due to biliary obstruction  Goal:	Resolution of abdominal pain  Instructions for follow-up, activity and diet:	You were found to have inflammation of the pancreas due to a stone that obstructed part of the normal drainage of bile from the liver to the intestines. Please continue to advance your diet as tolerated. If you experience worsening abdominal pain or fever, please return to the hospital for urgent evaluation. You should continue to waste your breast milk for 24 hours after discharge from the hospital. This is to make sure you don't accumulate the medications/contrast you were administered while in the hospital  Secondary Diagnosis:	PID (pelvic inflammatory disease) Principal Discharge DX:	Pancreatitis due to biliary obstruction  Goal:	Resolution of abdominal pain  Instructions for follow-up, activity and diet:	You were found to have inflammation of the pancreas due to a stone that obstructed part of the normal drainage of bile from the liver to the intestines. Please continue to advance your diet as tolerated. If you experience worsening abdominal pain or fever, please return to the hospital for urgent evaluation. You should continue to waste your breast milk for 24 hours after discharge from the hospital. This is to make sure you don't accumulate the medications/contrast you were administered while in the hospital. Principal Discharge DX:	Pancreatitis due to biliary obstruction  Goal:	Resolution of abdominal pain  Instructions for follow-up, activity and diet:	You were found to have inflammation of the pancreas due to a stone that obstructed part of the normal drainage of bile from the liver to the intestines. You had an MRI and an endoscopy to further evaluate this issue; both studies were normal. Please continue to advance your diet as tolerated. If you experience worsening abdominal pain or fever, please return to the hospital for urgent evaluation. You should continue to waste your breast milk for 24 hours after discharge from the hospital. This is to make sure you don't accumulate the medications/contrast you were administered while in the hospital. Please follow up with your primary care doctor and gastroenterology after discharge.

## 2017-04-27 VITALS
HEART RATE: 62 BPM | OXYGEN SATURATION: 99 % | TEMPERATURE: 98 F | RESPIRATION RATE: 16 BRPM | SYSTOLIC BLOOD PRESSURE: 105 MMHG | DIASTOLIC BLOOD PRESSURE: 77 MMHG

## 2017-04-27 LAB
ALBUMIN SERPL ELPH-MCNC: 4 G/DL — SIGNIFICANT CHANGE UP (ref 3.3–5)
ALP SERPL-CCNC: 283 U/L — HIGH (ref 40–120)
ALT FLD-CCNC: 174 U/L RC — HIGH (ref 10–45)
ANION GAP SERPL CALC-SCNC: 13 MMOL/L — SIGNIFICANT CHANGE UP (ref 5–17)
AST SERPL-CCNC: 57 U/L — HIGH (ref 10–40)
BILIRUB SERPL-MCNC: 0.3 MG/DL — SIGNIFICANT CHANGE UP (ref 0.2–1.2)
BUN SERPL-MCNC: 8 MG/DL — SIGNIFICANT CHANGE UP (ref 7–23)
CALCIUM SERPL-MCNC: 9 MG/DL — SIGNIFICANT CHANGE UP (ref 8.4–10.5)
CHLORIDE SERPL-SCNC: 105 MMOL/L — SIGNIFICANT CHANGE UP (ref 96–108)
CO2 SERPL-SCNC: 22 MMOL/L — SIGNIFICANT CHANGE UP (ref 22–31)
CREAT SERPL-MCNC: 0.49 MG/DL — LOW (ref 0.5–1.3)
GLUCOSE SERPL-MCNC: 96 MG/DL — SIGNIFICANT CHANGE UP (ref 70–99)
POTASSIUM SERPL-MCNC: 3.9 MMOL/L — SIGNIFICANT CHANGE UP (ref 3.5–5.3)
POTASSIUM SERPL-SCNC: 3.9 MMOL/L — SIGNIFICANT CHANGE UP (ref 3.5–5.3)
PROT SERPL-MCNC: 7.2 G/DL — SIGNIFICANT CHANGE UP (ref 6–8.3)
SODIUM SERPL-SCNC: 140 MMOL/L — SIGNIFICANT CHANGE UP (ref 135–145)

## 2017-04-27 PROCEDURE — 84295 ASSAY OF SERUM SODIUM: CPT

## 2017-04-27 PROCEDURE — 96375 TX/PRO/DX INJ NEW DRUG ADDON: CPT

## 2017-04-27 PROCEDURE — 93005 ELECTROCARDIOGRAM TRACING: CPT

## 2017-04-27 PROCEDURE — 99238 HOSP IP/OBS DSCHRG MGMT 30/<: CPT

## 2017-04-27 PROCEDURE — 82150 ASSAY OF AMYLASE: CPT

## 2017-04-27 PROCEDURE — 82330 ASSAY OF CALCIUM: CPT

## 2017-04-27 PROCEDURE — 86255 FLUORESCENT ANTIBODY SCREEN: CPT

## 2017-04-27 PROCEDURE — 99222 1ST HOSP IP/OBS MODERATE 55: CPT | Mod: 25,GC

## 2017-04-27 PROCEDURE — 85014 HEMATOCRIT: CPT

## 2017-04-27 PROCEDURE — 74183 MRI ABD W/O CNTR FLWD CNTR: CPT

## 2017-04-27 PROCEDURE — 82803 BLOOD GASES ANY COMBINATION: CPT

## 2017-04-27 PROCEDURE — 82977 ASSAY OF GGT: CPT

## 2017-04-27 PROCEDURE — 87046 STOOL CULTR AEROBIC BACT EA: CPT

## 2017-04-27 PROCEDURE — 87493 C DIFF AMPLIFIED PROBE: CPT

## 2017-04-27 PROCEDURE — 81001 URINALYSIS AUTO W/SCOPE: CPT

## 2017-04-27 PROCEDURE — 87045 FECES CULTURE AEROBIC BACT: CPT

## 2017-04-27 PROCEDURE — 84100 ASSAY OF PHOSPHORUS: CPT

## 2017-04-27 PROCEDURE — 84132 ASSAY OF SERUM POTASSIUM: CPT

## 2017-04-27 PROCEDURE — A9585: CPT

## 2017-04-27 PROCEDURE — 83735 ASSAY OF MAGNESIUM: CPT

## 2017-04-27 PROCEDURE — 87086 URINE CULTURE/COLONY COUNT: CPT

## 2017-04-27 PROCEDURE — 85610 PROTHROMBIN TIME: CPT

## 2017-04-27 PROCEDURE — 99285 EMERGENCY DEPT VISIT HI MDM: CPT | Mod: 25

## 2017-04-27 PROCEDURE — 85027 COMPLETE CBC AUTOMATED: CPT

## 2017-04-27 PROCEDURE — 43259 EGD US EXAM DUODENUM/JEJUNUM: CPT | Mod: GC

## 2017-04-27 PROCEDURE — 96374 THER/PROPH/DIAG INJ IV PUSH: CPT

## 2017-04-27 PROCEDURE — 85730 THROMBOPLASTIN TIME PARTIAL: CPT

## 2017-04-27 PROCEDURE — 80307 DRUG TEST PRSMV CHEM ANLYZR: CPT

## 2017-04-27 PROCEDURE — 83605 ASSAY OF LACTIC ACID: CPT

## 2017-04-27 PROCEDURE — 80053 COMPREHEN METABOLIC PANEL: CPT

## 2017-04-27 PROCEDURE — 83690 ASSAY OF LIPASE: CPT

## 2017-04-27 PROCEDURE — 82947 ASSAY GLUCOSE BLOOD QUANT: CPT

## 2017-04-27 PROCEDURE — 82435 ASSAY OF BLOOD CHLORIDE: CPT

## 2017-04-27 PROCEDURE — 86038 ANTINUCLEAR ANTIBODIES: CPT

## 2017-04-27 PROCEDURE — 80074 ACUTE HEPATITIS PANEL: CPT

## 2017-04-27 PROCEDURE — 76705 ECHO EXAM OF ABDOMEN: CPT

## 2017-04-27 RX ADMIN — Medication 650 MILLIGRAM(S): at 20:34

## 2017-04-28 LAB
CULTURE RESULTS: SIGNIFICANT CHANGE UP
SPECIMEN SOURCE: SIGNIFICANT CHANGE UP

## 2017-11-09 ENCOUNTER — OUTPATIENT (OUTPATIENT)
Dept: OUTPATIENT SERVICES | Facility: HOSPITAL | Age: 27
LOS: 1 days | End: 2017-11-09
Payer: SELF-PAY

## 2017-11-09 ENCOUNTER — APPOINTMENT (OUTPATIENT)
Dept: OBGYN | Facility: CLINIC | Age: 27
End: 2017-11-09
Payer: COMMERCIAL

## 2017-11-09 ENCOUNTER — LABORATORY RESULT (OUTPATIENT)
Age: 27
End: 2017-11-09

## 2017-11-09 VITALS — SYSTOLIC BLOOD PRESSURE: 100 MMHG | DIASTOLIC BLOOD PRESSURE: 60 MMHG | HEIGHT: 62 IN

## 2017-11-09 DIAGNOSIS — N76.0 ACUTE VAGINITIS: ICD-10-CM

## 2017-11-09 DIAGNOSIS — Z90.49 ACQUIRED ABSENCE OF OTHER SPECIFIED PARTS OF DIGESTIVE TRACT: Chronic | ICD-10-CM

## 2017-11-09 PROCEDURE — 99213 OFFICE O/P EST LOW 20 MIN: CPT | Mod: NC

## 2017-11-10 PROCEDURE — 87086 URINE CULTURE/COLONY COUNT: CPT

## 2017-11-10 PROCEDURE — 87186 SC STD MICRODIL/AGAR DIL: CPT

## 2017-11-10 PROCEDURE — G0463: CPT

## 2017-11-13 LAB
BILIRUBIN URINE: NORMAL
BILIRUBIN: NORMAL
BLOOD URINE: NORMAL
GLUCOSE QUALITATIVE U: NORMAL
GLUCOSE: NORMAL
KETONES URINE: NORMAL
KETONES: NORMAL
LEUKOCYTE ESTERASE URINE: NORMAL
NITRITE URINE: NORMAL
NITRITE, URINE: NORMAL
PH URINE: 5
PROTEIN: NORMAL
SPECIFIC GRAVITY URINE: 1.02
SPECIFIC GRAVITY: 1.02
UROBILINOGEN URINE: 0.2

## 2017-11-13 RX ORDER — CIPROFLOXACIN HYDROCHLORIDE 500 MG/1
500 TABLET, FILM COATED ORAL
Qty: 14 | Refills: 0 | Status: COMPLETED | COMMUNITY
Start: 2017-01-30 | End: 2017-11-13

## 2017-11-14 DIAGNOSIS — R10.9 UNSPECIFIED ABDOMINAL PAIN: ICD-10-CM

## 2017-11-14 DIAGNOSIS — L29.2 PRURITUS VULVAE: ICD-10-CM

## 2018-06-09 ENCOUNTER — EMERGENCY (EMERGENCY)
Facility: HOSPITAL | Age: 28
LOS: 1 days | Discharge: HOME | End: 2018-06-09
Attending: EMERGENCY MEDICINE
Payer: SELF-PAY

## 2018-06-09 VITALS
WEIGHT: 179.9 LBS | TEMPERATURE: 98 F | HEART RATE: 71 BPM | SYSTOLIC BLOOD PRESSURE: 93 MMHG | OXYGEN SATURATION: 97 % | RESPIRATION RATE: 18 BRPM | DIASTOLIC BLOOD PRESSURE: 63 MMHG | HEIGHT: 66 IN

## 2018-06-09 VITALS
DIASTOLIC BLOOD PRESSURE: 72 MMHG | RESPIRATION RATE: 18 BRPM | HEART RATE: 68 BPM | TEMPERATURE: 98 F | OXYGEN SATURATION: 100 % | SYSTOLIC BLOOD PRESSURE: 109 MMHG

## 2018-06-09 DIAGNOSIS — Z90.49 ACQUIRED ABSENCE OF OTHER SPECIFIED PARTS OF DIGESTIVE TRACT: Chronic | ICD-10-CM

## 2018-06-09 PROCEDURE — 99284 EMERGENCY DEPT VISIT MOD MDM: CPT | Mod: 25

## 2018-06-09 PROCEDURE — 99284 EMERGENCY DEPT VISIT MOD MDM: CPT

## 2018-06-09 PROCEDURE — 96375 TX/PRO/DX INJ NEW DRUG ADDON: CPT

## 2018-06-09 PROCEDURE — 96374 THER/PROPH/DIAG INJ IV PUSH: CPT

## 2018-06-09 RX ORDER — METOCLOPRAMIDE HCL 10 MG
10 TABLET ORAL ONCE
Qty: 0 | Refills: 0 | Status: COMPLETED | OUTPATIENT
Start: 2018-06-09 | End: 2018-06-09

## 2018-06-09 RX ORDER — SODIUM CHLORIDE 9 MG/ML
1000 INJECTION INTRAMUSCULAR; INTRAVENOUS; SUBCUTANEOUS ONCE
Qty: 0 | Refills: 0 | Status: COMPLETED | OUTPATIENT
Start: 2018-06-09 | End: 2018-06-09

## 2018-06-09 RX ORDER — KETOROLAC TROMETHAMINE 30 MG/ML
15 SYRINGE (ML) INJECTION ONCE
Qty: 0 | Refills: 0 | Status: DISCONTINUED | OUTPATIENT
Start: 2018-06-09 | End: 2018-06-09

## 2018-06-09 RX ADMIN — SODIUM CHLORIDE 1000 MILLILITER(S): 9 INJECTION INTRAMUSCULAR; INTRAVENOUS; SUBCUTANEOUS at 14:02

## 2018-06-09 RX ADMIN — Medication 15 MILLIGRAM(S): at 14:02

## 2018-06-09 RX ADMIN — Medication 10 MILLIGRAM(S): at 14:02

## 2018-06-09 NOTE — ED PROVIDER NOTE - CARE PLAN
Principal Discharge DX:	Migraine without status migrainosus, not intractable, unspecified migraine type  Assessment and plan of treatment:	You were seen in the ER for migraine. You must follow up with your primary physician in 24 to 48 hours. Return to the ER for any new or worsening signs/symptoms.   1) You may follow up with the neurology clinic. The number for the neurology clinic is [phone].  2) Take Tylenol (acetaminophen) 1000 mg every 6 to 8 hours as needed for pain with a daily maximal dose of 3000 mg.  3) Take ibuprofen, 600 mg,  every 6 to 8 hours as needed for pain with a maximum daily dose of 1800 mg.

## 2018-06-09 NOTE — ED PROVIDER NOTE - ATTENDING CONTRIBUTION TO CARE
pt is a 26 y/o ha l sided with photophobia not relieved with motrin, non focal neuro exam, cn 2-12 intact, well appearing, vss, migraine cocktail ordered, not a thunder clamp ha

## 2018-06-09 NOTE — ED PROVIDER NOTE - NS ED ROS FT
GENERAL: No fever or chills, //             EYES: no change in vision, //             HEENT: no trouble swallowing or speaking, //             CARDIAC: no chest pain, //              PULMONARY: no cough or SOB, //             GI: no abdominal pain, no nausea or no vomiting, no diarrhea or constipation, //             : No changes in urination,  //            SKIN: no rashes,  //     //             MSK: No joint pain otherwise as HPI or negative. ~Shen Garner M.D., Ph.D. -Resident

## 2018-06-09 NOTE — ED ADULT NURSE NOTE - OBJECTIVE STATEMENT
27 y f came to the ed c/o left sided headache. states the headache started 4 hours ago and did not go away with advil. c/o feeling like "brain freeze." denies any neck pain, eye pain, visual changes. patient is a/ox3. denies fevers, chills, chest pain, sob.

## 2018-06-09 NOTE — ED PROVIDER NOTE - PHYSICAL EXAMINATION
Gen: NAD, AOx3, non-toxic //            Head: NCAT //            HEENT: EOMI, oral mucosa moist, normal conjunctiva //            Lung: CTAB, no respiratory distress, no wheezes/rhonchi/rales B/L, speaking in full sentences. //            CV: RRR, no murmurs, rubs or gallops //            Abd: soft, NTND, no guarding, no CVA tenderness //            MSK: no visible deformities //           NEUROLOGIC: CN II-XII intact. Strength and sensation symmetric and intact throughout. Cerebellar testing normal.             Skin: Warm, well perfused, no rash //            Psych: normal affect. ~Shen Garner M.D., Ph.D. -Resident

## 2018-06-09 NOTE — ED PROVIDER NOTE - OBJECTIVE STATEMENT
27 female history of headaches here for headache. left sided, severe, onset a few hours ago, constant, throbbing, associated with photophobia, scotomas. Unlike prior headaches. Gradual onset. Nausea but no emesis.

## 2018-06-14 ENCOUNTER — EMERGENCY (EMERGENCY)
Facility: HOSPITAL | Age: 28
LOS: 1 days | Discharge: HOME | End: 2018-06-14
Attending: EMERGENCY MEDICINE
Payer: SELF-PAY

## 2018-06-14 VITALS
SYSTOLIC BLOOD PRESSURE: 111 MMHG | WEIGHT: 179.9 LBS | HEART RATE: 70 BPM | DIASTOLIC BLOOD PRESSURE: 68 MMHG | HEIGHT: 62 IN | OXYGEN SATURATION: 100 % | TEMPERATURE: 98 F | RESPIRATION RATE: 16 BRPM

## 2018-06-14 VITALS
DIASTOLIC BLOOD PRESSURE: 83 MMHG | OXYGEN SATURATION: 100 % | RESPIRATION RATE: 16 BRPM | HEART RATE: 65 BPM | SYSTOLIC BLOOD PRESSURE: 130 MMHG | TEMPERATURE: 99 F

## 2018-06-14 DIAGNOSIS — Z90.49 ACQUIRED ABSENCE OF OTHER SPECIFIED PARTS OF DIGESTIVE TRACT: Chronic | ICD-10-CM

## 2018-06-14 PROCEDURE — 96375 TX/PRO/DX INJ NEW DRUG ADDON: CPT

## 2018-06-14 PROCEDURE — 99053 MED SERV 10PM-8AM 24 HR FAC: CPT

## 2018-06-14 PROCEDURE — 70450 CT HEAD/BRAIN W/O DYE: CPT

## 2018-06-14 PROCEDURE — 99284 EMERGENCY DEPT VISIT MOD MDM: CPT | Mod: 25

## 2018-06-14 PROCEDURE — 70450 CT HEAD/BRAIN W/O DYE: CPT | Mod: 26

## 2018-06-14 PROCEDURE — 96374 THER/PROPH/DIAG INJ IV PUSH: CPT

## 2018-06-14 RX ORDER — KETOROLAC TROMETHAMINE 30 MG/ML
15 SYRINGE (ML) INJECTION ONCE
Qty: 0 | Refills: 0 | Status: DISCONTINUED | OUTPATIENT
Start: 2018-06-14 | End: 2018-06-14

## 2018-06-14 RX ORDER — SODIUM CHLORIDE 9 MG/ML
1000 INJECTION INTRAMUSCULAR; INTRAVENOUS; SUBCUTANEOUS ONCE
Qty: 0 | Refills: 0 | Status: COMPLETED | OUTPATIENT
Start: 2018-06-14 | End: 2018-06-14

## 2018-06-14 RX ORDER — METOCLOPRAMIDE HCL 10 MG
10 TABLET ORAL ONCE
Qty: 0 | Refills: 0 | Status: COMPLETED | OUTPATIENT
Start: 2018-06-14 | End: 2018-06-14

## 2018-06-14 RX ORDER — ACETAMINOPHEN 500 MG
1000 TABLET ORAL ONCE
Qty: 0 | Refills: 0 | Status: COMPLETED | OUTPATIENT
Start: 2018-06-14 | End: 2018-06-14

## 2018-06-14 RX ADMIN — Medication 15 MILLIGRAM(S): at 09:05

## 2018-06-14 RX ADMIN — SODIUM CHLORIDE 1000 MILLILITER(S): 9 INJECTION INTRAMUSCULAR; INTRAVENOUS; SUBCUTANEOUS at 08:07

## 2018-06-14 RX ADMIN — Medication 15 MILLIGRAM(S): at 08:07

## 2018-06-14 RX ADMIN — Medication 10 MILLIGRAM(S): at 08:07

## 2018-06-14 RX ADMIN — Medication 400 MILLIGRAM(S): at 08:07

## 2018-06-14 RX ADMIN — Medication 1000 MILLIGRAM(S): at 09:05

## 2018-06-14 NOTE — ED PROVIDER NOTE - PLAN OF CARE
1) Follow up with your doctor on your scheduled appointment.   2) Return to the ER immediately for new or worsening symptoms including vomiting, fevers or other issues.   3) Take Tylenol 1000mg every 6 hours as needed for a headache.   4)  Take Ibuprofen 600mg every 6 hours as needed for headache.

## 2018-06-14 NOTE — ED PROVIDER NOTE - OBJECTIVE STATEMENT
27yoF no pmh pw headache, starting about 5 days ago, where she was seen in this ER. since then has had worsening headache, mostly in the morning, not improved with tylenol, moving from left frontal now to right frontal area, associated with some "lights" and feeling "shaky". went to work this morning but was unable to tolerate due to pain. no trauma, no fevers, chills, neck stiffness, nausea, vomiting, diarrhea, chest pain, sob, weakness, numbness, tingling, recent travel, ocp use, or other issues.

## 2018-06-14 NOTE — ED ADULT TRIAGE NOTE - CHIEF COMPLAINT QUOTE
head and eye pain.  seen earlier in week for same.  reported fever of 103 2 days ago.  no rash or photophobia

## 2018-06-14 NOTE — ED PROVIDER NOTE - CARE PLAN
Primary osteoarthritis of left knee  11/28/2017    Active  [place] Principal Discharge DX:	Acute nonintractable headache, unspecified headache type  Assessment and plan of treatment:	1) Follow up with your doctor on your scheduled appointment.   2) Return to the ER immediately for new or worsening symptoms including vomiting, fevers or other issues.   3) Take Tylenol 1000mg every 6 hours as needed for a headache.   4)  Take Ibuprofen 600mg every 6 hours as needed for headache.

## 2018-06-14 NOTE — ED ADULT NURSE NOTE - OBJECTIVE STATEMENT
26 y/o female presenting to ED c/o headache. Pt was seen in this ED Saturday 6/9, and d/c to f/u with Neurology. Pt states she was unable to obtain a neurology appointment. pt describes headache as dull, throbbing concentrated in eye area. At this time denies blurred vision, slurred speech, chest pain, sob, n/v/d, abdominal pain, f/c, urinary symptoms, hematuria, weakness. Endorses "feeling feverish" on Tuesday 6/12. A&Ox4 gross neuro intact, lungs cta bilaterally, no difficulty speaking in complete sentences, s1s2 heart sounds heard, pulses x 4, gu x4, abdomen soft nontender nondistended, skin intact. Patient undressed and placed into gown, call bell in hand and side rails up for safety. warm blanket provided, vital signs stable, pt in no acute distress.

## 2018-06-14 NOTE — ED ADULT NURSE REASSESSMENT NOTE - NS ED NURSE REASSESS COMMENT FT1
Pt. currently resting comfortably in room 1. Pt. states relief from pain. Plan is for pt to be d/c after reeval by Md. Safety and comfort measures maintained.

## 2018-06-14 NOTE — ED ADULT NURSE NOTE - CHPI ED SYMPTOMS NEG
no numbness/no change in level of consciousness/no weakness/no confusion/no vomiting/no blurred vision/no fever/no loss of consciousness

## 2018-06-14 NOTE — ED PROVIDER NOTE - ATTENDING CONTRIBUTION TO CARE
Patient presenting with headache.  R sided, associated with photophobia, scotomas.  Had similar headache 4 days ago which responded to treatment for migrane.  Has follow up with neurology in 2 weeks.    On exam patient well appearing, vital signs within normal limits, RRR S1/S2, lungs clear to ascultation bilaterally, abdomen soft, non tender, non distended, neurologically intact, no meningismus, no rashes.    History seems consistent with ongoing migrane headaches, given multiple days of symptoms and no prior history of migranes now on 2nd Emergency Department visit will obtain CT head to r/o mass effect/tumor, treat for migrane and re-eval.

## 2018-06-14 NOTE — ED PROVIDER NOTE - MEDICAL DECISION MAKING DETAILS
27yoF bounce back for headache. no ct scan prior. symptoms worsening. likely benign headache vs migraine, will rule out mass or ich with head ct, POCT pregnany, and give meds and refer to neurology clinic. If symptoms do not improve will consider cdu for therapy and neurology consult.

## 2018-06-14 NOTE — ED PROVIDER NOTE - PROGRESS NOTE DETAILS
Reese MALAVE - PT seen and reassessed.  Patient symptomatically improved.   AAOX3, NAD, VSS.  Discussed test results w/ patient, given copy of results. Patient verbalized understanding of hospital course and outpatient plans, has decisional making capacity.  Will follow-up with Primary care doctor in the next 5-7 days; patient will call for an appointment. Will return to the ED if there is any worsening of symptoms.  Patient able to ambulate w/o difficulty, is tolerating PO intake

## 2018-08-10 ENCOUNTER — EMERGENCY (EMERGENCY)
Facility: HOSPITAL | Age: 28
LOS: 1 days | Discharge: HOME | End: 2018-08-10
Attending: EMERGENCY MEDICINE
Payer: SELF-PAY

## 2018-08-10 VITALS
OXYGEN SATURATION: 100 % | RESPIRATION RATE: 20 BRPM | SYSTOLIC BLOOD PRESSURE: 116 MMHG | HEART RATE: 61 BPM | TEMPERATURE: 98 F | DIASTOLIC BLOOD PRESSURE: 69 MMHG

## 2018-08-10 VITALS
RESPIRATION RATE: 16 BRPM | SYSTOLIC BLOOD PRESSURE: 110 MMHG | HEART RATE: 58 BPM | DIASTOLIC BLOOD PRESSURE: 68 MMHG | TEMPERATURE: 98 F | OXYGEN SATURATION: 100 %

## 2018-08-10 DIAGNOSIS — Z90.49 ACQUIRED ABSENCE OF OTHER SPECIFIED PARTS OF DIGESTIVE TRACT: Chronic | ICD-10-CM

## 2018-08-10 PROBLEM — N39.0 URINARY TRACT INFECTION, SITE NOT SPECIFIED: Chronic | Status: ACTIVE | Noted: 2017-02-23

## 2018-08-10 LAB
ALBUMIN SERPL ELPH-MCNC: 4.5 G/DL — SIGNIFICANT CHANGE UP (ref 3.3–5)
ALP SERPL-CCNC: 113 U/L — SIGNIFICANT CHANGE UP (ref 40–120)
ALT FLD-CCNC: 18 U/L — SIGNIFICANT CHANGE UP (ref 10–45)
ANION GAP SERPL CALC-SCNC: 9 MMOL/L — SIGNIFICANT CHANGE UP (ref 5–17)
ANISOCYTOSIS BLD QL: SLIGHT — SIGNIFICANT CHANGE UP
APPEARANCE UR: ABNORMAL
AST SERPL-CCNC: 18 U/L — SIGNIFICANT CHANGE UP (ref 10–40)
BASOPHILS # BLD AUTO: 0.1 K/UL — SIGNIFICANT CHANGE UP (ref 0–0.2)
BASOPHILS NFR BLD AUTO: 1.2 % — SIGNIFICANT CHANGE UP (ref 0–2)
BILIRUB SERPL-MCNC: 0.2 MG/DL — SIGNIFICANT CHANGE UP (ref 0.2–1.2)
BILIRUB UR-MCNC: NEGATIVE — SIGNIFICANT CHANGE UP
BUN SERPL-MCNC: 13 MG/DL — SIGNIFICANT CHANGE UP (ref 7–23)
C TRACH RRNA SPEC QL NAA+PROBE: SIGNIFICANT CHANGE UP
CALCIUM SERPL-MCNC: 9.2 MG/DL — SIGNIFICANT CHANGE UP (ref 8.4–10.5)
CHLORIDE SERPL-SCNC: 104 MMOL/L — SIGNIFICANT CHANGE UP (ref 96–108)
CO2 SERPL-SCNC: 22 MMOL/L — SIGNIFICANT CHANGE UP (ref 22–31)
COLOR SPEC: SIGNIFICANT CHANGE UP
CREAT SERPL-MCNC: 0.67 MG/DL — SIGNIFICANT CHANGE UP (ref 0.5–1.3)
DIFF PNL FLD: ABNORMAL
EOSINOPHIL # BLD AUTO: 0.1 K/UL — SIGNIFICANT CHANGE UP (ref 0–0.5)
EOSINOPHIL NFR BLD AUTO: 1.6 % — SIGNIFICANT CHANGE UP (ref 0–6)
EPI CELLS # UR: SIGNIFICANT CHANGE UP /HPF
GLUCOSE SERPL-MCNC: 107 MG/DL — HIGH (ref 70–99)
GLUCOSE UR QL: NEGATIVE — SIGNIFICANT CHANGE UP
HCT VFR BLD CALC: 39.6 % — SIGNIFICANT CHANGE UP (ref 34.5–45)
HGB BLD-MCNC: 12.2 G/DL — SIGNIFICANT CHANGE UP (ref 11.5–15.5)
KETONES UR-MCNC: NEGATIVE — SIGNIFICANT CHANGE UP
LEUKOCYTE ESTERASE UR-ACNC: ABNORMAL
LYMPHOCYTES # BLD AUTO: 2.4 K/UL — SIGNIFICANT CHANGE UP (ref 1–3.3)
LYMPHOCYTES # BLD AUTO: 34.3 % — SIGNIFICANT CHANGE UP (ref 13–44)
MCHC RBC-ENTMCNC: 24.7 PG — LOW (ref 27–34)
MCHC RBC-ENTMCNC: 30.9 GM/DL — LOW (ref 32–36)
MCV RBC AUTO: 79.9 FL — LOW (ref 80–100)
MONOCYTES # BLD AUTO: 0.4 K/UL — SIGNIFICANT CHANGE UP (ref 0–0.9)
MONOCYTES NFR BLD AUTO: 5.5 % — SIGNIFICANT CHANGE UP (ref 2–14)
N GONORRHOEA RRNA SPEC QL NAA+PROBE: SIGNIFICANT CHANGE UP
NEUTROPHILS # BLD AUTO: 4.1 K/UL — SIGNIFICANT CHANGE UP (ref 1.8–7.4)
NEUTROPHILS NFR BLD AUTO: 57.5 % — SIGNIFICANT CHANGE UP (ref 43–77)
NITRITE UR-MCNC: POSITIVE
PH UR: 5.5 — SIGNIFICANT CHANGE UP (ref 5–8)
PLAT MORPH BLD: NORMAL — SIGNIFICANT CHANGE UP
PLATELET # BLD AUTO: 303 K/UL — SIGNIFICANT CHANGE UP (ref 150–400)
POIKILOCYTOSIS BLD QL AUTO: SLIGHT — SIGNIFICANT CHANGE UP
POLYCHROMASIA BLD QL SMEAR: SLIGHT — SIGNIFICANT CHANGE UP
POTASSIUM SERPL-MCNC: 4 MMOL/L — SIGNIFICANT CHANGE UP (ref 3.5–5.3)
POTASSIUM SERPL-SCNC: 4 MMOL/L — SIGNIFICANT CHANGE UP (ref 3.5–5.3)
PROT SERPL-MCNC: 7.9 G/DL — SIGNIFICANT CHANGE UP (ref 6–8.3)
PROT UR-MCNC: NEGATIVE — SIGNIFICANT CHANGE UP
RBC # BLD: 4.95 M/UL — SIGNIFICANT CHANGE UP (ref 3.8–5.2)
RBC # FLD: 12.8 % — SIGNIFICANT CHANGE UP (ref 10.3–14.5)
RBC BLD AUTO: ABNORMAL
RBC CASTS # UR COMP ASSIST: ABNORMAL /HPF (ref 0–2)
SODIUM SERPL-SCNC: 135 MMOL/L — SIGNIFICANT CHANGE UP (ref 135–145)
SP GR SPEC: 1.02 — SIGNIFICANT CHANGE UP (ref 1.01–1.02)
SPECIMEN SOURCE: SIGNIFICANT CHANGE UP
TARGETS BLD QL SMEAR: SLIGHT — SIGNIFICANT CHANGE UP
UROBILINOGEN FLD QL: NEGATIVE — SIGNIFICANT CHANGE UP
WBC # BLD: 7.1 K/UL — SIGNIFICANT CHANGE UP (ref 3.8–10.5)
WBC # FLD AUTO: 7.1 K/UL — SIGNIFICANT CHANGE UP (ref 3.8–10.5)
WBC UR QL: SIGNIFICANT CHANGE UP /HPF (ref 0–5)

## 2018-08-10 PROCEDURE — 87186 SC STD MICRODIL/AGAR DIL: CPT

## 2018-08-10 PROCEDURE — 87086 URINE CULTURE/COLONY COUNT: CPT

## 2018-08-10 PROCEDURE — 87591 N.GONORRHOEAE DNA AMP PROB: CPT

## 2018-08-10 PROCEDURE — 99053 MED SERV 10PM-8AM 24 HR FAC: CPT

## 2018-08-10 PROCEDURE — 99284 EMERGENCY DEPT VISIT MOD MDM: CPT | Mod: 25

## 2018-08-10 PROCEDURE — 76770 US EXAM ABDO BACK WALL COMP: CPT | Mod: 26

## 2018-08-10 PROCEDURE — 80053 COMPREHEN METABOLIC PANEL: CPT

## 2018-08-10 PROCEDURE — 96375 TX/PRO/DX INJ NEW DRUG ADDON: CPT

## 2018-08-10 PROCEDURE — 81001 URINALYSIS AUTO W/SCOPE: CPT

## 2018-08-10 PROCEDURE — 85027 COMPLETE CBC AUTOMATED: CPT

## 2018-08-10 PROCEDURE — 83690 ASSAY OF LIPASE: CPT

## 2018-08-10 PROCEDURE — 76770 US EXAM ABDO BACK WALL COMP: CPT

## 2018-08-10 PROCEDURE — 87491 CHLMYD TRACH DNA AMP PROBE: CPT

## 2018-08-10 PROCEDURE — 96374 THER/PROPH/DIAG INJ IV PUSH: CPT

## 2018-08-10 RX ORDER — ONDANSETRON 8 MG/1
4 TABLET, FILM COATED ORAL ONCE
Qty: 0 | Refills: 0 | Status: COMPLETED | OUTPATIENT
Start: 2018-08-10 | End: 2018-08-10

## 2018-08-10 RX ORDER — ONDANSETRON 8 MG/1
1 TABLET, FILM COATED ORAL
Qty: 9 | Refills: 0
Start: 2018-08-10 | End: 2018-08-12

## 2018-08-10 RX ORDER — KETOROLAC TROMETHAMINE 30 MG/ML
15 SYRINGE (ML) INJECTION ONCE
Qty: 0 | Refills: 0 | Status: DISCONTINUED | OUTPATIENT
Start: 2018-08-10 | End: 2018-08-10

## 2018-08-10 RX ORDER — CEPHALEXIN 500 MG
1 CAPSULE ORAL
Qty: 42 | Refills: 0
Start: 2018-08-10 | End: 2018-08-23

## 2018-08-10 RX ORDER — CEFTRIAXONE 500 MG/1
1 INJECTION, POWDER, FOR SOLUTION INTRAMUSCULAR; INTRAVENOUS EVERY 24 HOURS
Qty: 0 | Refills: 0 | Status: DISCONTINUED | OUTPATIENT
Start: 2018-08-10 | End: 2018-08-14

## 2018-08-10 RX ADMIN — CEFTRIAXONE 100 GRAM(S): 500 INJECTION, POWDER, FOR SOLUTION INTRAMUSCULAR; INTRAVENOUS at 07:51

## 2018-08-10 RX ADMIN — ONDANSETRON 4 MILLIGRAM(S): 8 TABLET, FILM COATED ORAL at 07:42

## 2018-08-10 RX ADMIN — Medication 15 MILLIGRAM(S): at 08:37

## 2018-08-10 RX ADMIN — Medication 15 MILLIGRAM(S): at 07:42

## 2018-08-10 NOTE — ED PROVIDER NOTE - PROGRESS NOTE DETAILS
UA shows signs of infection, will treat with abx. reviewed previous cultures, pan sensitive pt feeling better. given abx for pyelo. close return precautions. no hydro on ultrasound and less likely ureteral colic based on history. d/w pt. will return for any fevers or worsening pain. pt well appearing. will d/c

## 2018-08-10 NOTE — ED PROVIDER NOTE - OBJECTIVE STATEMENT
28 y/o previously healthy female presenting with left sided back pain and dysuria for the past 3 days. pt states has a h/o UTI in the past. last abx 2 years ago. no fevers or chills. some nausea today, no vomiting. no black or bloody stools. did report some mild redness to urine. no h/o ureteral colic. pain is constant since starting. did not take anything for pain. no h/o std. no vaginal discharge. lmp 2 years ago. pt states has an IUD and does not get her menstrual cycles. no falls or trauma. no heavy lifting

## 2018-08-10 NOTE — ED PROVIDER NOTE - MEDICAL DECISION MAKING DETAILS
26 y/o female presenting with dysuria and left cva ttp. pocus shows no hydro. no h/o ureteral colic. concern for pyelo. no h/o ovarian cyst. will check UA, creatinine as h/o frequent uti , urine pregnancy and re-eval 26 y/o female presenting with dysuria and left cva ttp. pocus shows no hydro. no h/o ureteral colic. concern for pyelo. no h/o ovarian cyst. will check UA, creatinine as h/o frequent uti , urine pregnancy and re-eval. no cmt to suggest PID

## 2018-08-10 NOTE — ED ADULT NURSE NOTE - OBJECTIVE STATEMENT
27 yr old female arrived to the ED from home c/o back pain and burning upon urination. per pt urinary symptoms started x3 days ago.  pt endorses burning upon urination and  increased urinary frequency 27 yr old female arrived to the ED from home c/o back pain and burning upon urination. per pt urinary symptoms started x3 days ago.  pt endorses burning upon urination and increased urinary frequency. pt states she had a fever at home but was afebrile upon arrival to the ED. upon assessment pt is a&ox3, neuro grossly intact, pt ambulatory, lungs clear lars. abd mildly tender to palpation in LLQ. +CVA tenderness Lars. skin WDL.

## 2018-08-10 NOTE — ED PROVIDER NOTE - PHYSICAL EXAMINATION
Attending Kianna Cruz: Gen: NAD, heent: atrauamtic, eomi, perrla, mmm, op pink, uvula midline, neck; nttp, no nuchal rigidity, chest: nttp, no crepitus, cv: rrr, no murmurs, lungs: ctab, abd: soft, nontender, nondistended, no peritoneal signs, +BS, no guarding, ext: left cva ttp, neg homans, skin: no rash, neuro: awake and alert, following commands, speech clear, sensation and strength intact, no focal deficits Attending Kianna Cruz: Gen: NAD, heent: atrauamtic, eomi, perrla, mmm, op pink, uvula midline, neck; nttp, no nuchal rigidity, chest: nttp, no crepitus, cv: rrr, no murmurs, lungs: ctab, abd: soft, nontender, nondistended, no peritoneal signs, +BS, no guarding, ext: left cva ttp, neg homans, skin: no rash, neuro: awake and alert, following commands, speech clear, sensation and strength intact, no focal deficits. : no cmt, no adnexal ttp, no lesions

## 2018-08-10 NOTE — ED ADULT NURSE NOTE - NSIMPLEMENTINTERV_GEN_ALL_ED
Implemented All Universal Safety Interventions:  [place] to call system. Call bell, personal items and telephone within reach. Instruct patient to call for assistance. Room bathroom lighting operational. Non-slip footwear when patient is off stretcher. Physically safe environment: no spills, clutter or unnecessary equipment. Stretcher in lowest position, wheels locked, appropriate side rails in place.

## 2018-08-12 NOTE — ED POST DISCHARGE NOTE - DETAILS
Patient discharged on keflex.  Culture pansensitive to cephalosporins. Appropriate care provided.  -Kennedy Krishnamurthy PA-C

## 2018-12-11 NOTE — ED PROVIDER NOTE - PRINCIPAL DIAGNOSIS
CERTIFICATE OF SCHOOL and PHYSICAL EDUCATION    12/11/2018      Re: Luis Remy        [address]  [facility] [address]      This is to certify that Luis Remy has been under my care from12/11/2018 and may return to all activities as tolerated.    RESTRICTIONS: none            SIGNATURE:___________________________________________,   12/11/2018  Olga Narayanan MD        [facility] [place] [address] [address]  [facility] [place] [address] [address]  [facility] [address]  [facility] [id number] [phone]   Migraine without status migrainosus, not intractable, unspecified migraine type

## 2019-05-02 ENCOUNTER — EMERGENCY (EMERGENCY)
Facility: HOSPITAL | Age: 29
LOS: 1 days | Discharge: HOME | End: 2019-05-02
Attending: EMERGENCY MEDICINE
Payer: MEDICAID

## 2019-05-02 VITALS
TEMPERATURE: 98 F | HEART RATE: 70 BPM | HEIGHT: 62 IN | RESPIRATION RATE: 18 BRPM | SYSTOLIC BLOOD PRESSURE: 104 MMHG | DIASTOLIC BLOOD PRESSURE: 65 MMHG | OXYGEN SATURATION: 99 % | WEIGHT: 179.9 LBS

## 2019-05-02 DIAGNOSIS — Z90.49 ACQUIRED ABSENCE OF OTHER SPECIFIED PARTS OF DIGESTIVE TRACT: Chronic | ICD-10-CM

## 2019-05-02 LAB
APPEARANCE UR: CLEAR — SIGNIFICANT CHANGE UP
BACTERIA # UR AUTO: NEGATIVE — SIGNIFICANT CHANGE UP
BILIRUB UR-MCNC: NEGATIVE — SIGNIFICANT CHANGE UP
COLOR SPEC: SIGNIFICANT CHANGE UP
DIFF PNL FLD: ABNORMAL
EPI CELLS # UR: 2 /HPF — SIGNIFICANT CHANGE UP
GLUCOSE UR QL: NEGATIVE — SIGNIFICANT CHANGE UP
HYALINE CASTS # UR AUTO: 0 /LPF — SIGNIFICANT CHANGE UP (ref 0–2)
KETONES UR-MCNC: NEGATIVE — SIGNIFICANT CHANGE UP
LEUKOCYTE ESTERASE UR-ACNC: ABNORMAL
NITRITE UR-MCNC: NEGATIVE — SIGNIFICANT CHANGE UP
PH UR: 5.5 — SIGNIFICANT CHANGE UP (ref 5–8)
PROT UR-MCNC: NEGATIVE — SIGNIFICANT CHANGE UP
RBC CASTS # UR COMP ASSIST: 3 /HPF — SIGNIFICANT CHANGE UP (ref 0–4)
SP GR SPEC: 1.02 — SIGNIFICANT CHANGE UP (ref 1.01–1.02)
UROBILINOGEN FLD QL: NEGATIVE — SIGNIFICANT CHANGE UP
WBC UR QL: 11 /HPF — HIGH (ref 0–5)

## 2019-05-02 PROCEDURE — 99283 EMERGENCY DEPT VISIT LOW MDM: CPT

## 2019-05-02 RX ORDER — CEPHALEXIN 500 MG
1 CAPSULE ORAL
Qty: 56 | Refills: 0
Start: 2019-05-02 | End: 2019-05-15

## 2019-05-02 RX ORDER — CEPHALEXIN 500 MG
500 CAPSULE ORAL ONCE
Qty: 0 | Refills: 0 | Status: COMPLETED | OUTPATIENT
Start: 2019-05-02 | End: 2019-05-02

## 2019-05-02 RX ADMIN — Medication 500 MILLIGRAM(S): at 15:13

## 2019-05-02 NOTE — ED PROVIDER NOTE - PROGRESS NOTE DETAILS
Patient feels well, tolerating PO. Discussed ua findings with patient. Patient feels comfortable going home. Gave home care and follow up instructions. Discussed which symptoms to look out for and when to return to the ED for further evaluation. Patient given opportunity to ask questions about their medical condition and had all questions answered.

## 2019-05-02 NOTE — ED PROVIDER NOTE - CLINICAL SUMMARY MEDICAL DECISION MAKING FREE TEXT BOX
See attending physician attestation note. See attending physician attestation note.    27 y/o F w pmhx of UTI, PID, kidney infection, pshx of cholecystectomy, p/w left sided lower back pain, progressing dysuria x2weeks, likely pyelo, afebrile at current, will test urine, culture, poct preg, abx pcp followup

## 2019-05-02 NOTE — ED ADULT NURSE NOTE - OBJECTIVE STATEMENT
pt is here for left flank pain.  she has an iud also that she suspects is causing increased pain.  she has pain on urination as well

## 2019-05-02 NOTE — ED PROVIDER NOTE - NS ED ROS FT
ROS:  GENERAL: No fever, no chills  EYES: no change in vision  HEENT: no trouble swallowing, no trouble speaking  CARDIAC: no chest pain  PULMONARY: no cough, no shortness of breath  GI: no abdominal pain, no nausea, no vomiting, no diarrhea, no constipation  : + dysuria, + frequency, dark appearance, or odor of urine  SKIN: no rashes  NEURO: no headache, no weakness  MSK: No joint pain  ~Devin Monte D.O. -Resident

## 2019-05-02 NOTE — ED PROVIDER NOTE - NSFOLLOWUPINSTRUCTIONS_ED_ALL_ED_FT
Urinary Tract Infection    A urinary tract infection (UTI) is an infection of any part of the urinary tract, which includes the kidneys, ureters, bladder, and urethra. Risk factors include ignoring your need to urinate, wiping back to front if female, being an uncircumcised male, and having diabetes or a weak immune system. Symptoms include frequent urination, pain or burning with urination, foul smelling urine, cloudy urine, pain in the lower abdomen, blood in the urine, and fever. If you were prescribed an antibiotic medicine, take it as told by your health care provider. Do not stop taking the antibiotic even if you start to feel better.    SEEK IMMEDIATE MEDICAL CARE IF YOU HAVE ANY OF THE FOLLOWING SYMPTOMS: severe back or abdominal pain, fever, inability to keep fluids or medicine down, dizziness/lightheadedness, or a change in mental status.    1. TAKE ALL MEDICATIONS AS DIRECTED.    2. FOR PAIN OR FEVER YOU CAN TAKE IBUPROFEN (MOTRIN, ADVIL) OR ACETAMINOPHEN (TYLENOL) AS NEEDED, AS DIRECTED ON PACKAGING.  3. FOLLOW UP WITH YOUR PRIMARY DOCTOR WITHIN 5 DAYS AS DIRECTED.  4. IF YOU HAD LABS OR IMAGING DONE, YOU WERE GIVEN COPIES OF ALL LABS AND/OR IMAGING RESULTS FROM YOUR ER VISIT--PLEASE TAKE THEM WITH YOU TO YOUR FOLLOW UP APPOINTMENTS.  5. IF NEEDED, CALL PATIENT ACCESS SERVICES AT [phone] TO FIND A PRIMARY CARE PHYSICIAN.  OR CALL [phone] TO MAKE AN APPOINTMENT WITH THE CLINIC.  6. RETURN TO THE ER FOR ANY WORSENING SYMPTOMS OR CONCERNS.

## 2019-05-02 NOTE — ED ADULT NURSE NOTE - NSIMPLEMENTINTERV_GEN_ALL_ED
Implemented All Universal Safety Interventions:  [place] to call system. Call bell, personal items and telephone within reach. Instruct patient to call for assistance. Room bathroom lighting operational. Non-slip footwear when patient is off stretcher. Physically safe environment: no spills, clutter or unnecessary equipment. Stretcher in lowest position, wheels locked, appropriate side rails in place.

## 2019-05-02 NOTE — ED PROVIDER NOTE - ATTENDING CONTRIBUTION TO CARE
29 y/o female with the above documented history and HPI who on exam appears well and comfortable. VSs noted, sclerae anicteric, MM's moist, neck supple, lungs CTA, cardiac sounds s/ audible m/r/g, abdomen soft, NT/ND, extremities s/ asymmetry, skin s/ rash or jaundice and neurologically intact. Presentation and UA suggest UTI/Pyelo. Febrile the other day c/ flank pain we will provided an extended course of ABX. UCx sent. Does not require any additional diagnostic investigation or intervention at this time. To be discharged.

## 2019-05-02 NOTE — ED PROVIDER NOTE - OBJECTIVE STATEMENT
29 y/o F w pmhx of UTI, PID, kidney infection, pshx of cholecystectomy, p/w left sided lower back pain, progressing dysuria x2weeks. Pt with fever and chills 2 weeks ago, now resolved.  +mild vaginal discharge when wiping after urination.  +urinary frequency.  Denies cp, malodorous urine, rashes, vaginal sores, sob, abd pain, leg swelling Pt with IUD in, does not get periods. Pt is sexually active, no new sexual partners. NKDA. 29 y/o F w pmhx of UTI, PID, kidney infection, pshx of cholecystectomy, p/w left sided lower back pain, progressing dysuria x2weeks. Pt with fever and chills 2 weeks ago, now resolved.  +mild vaginal discharge when wiping after urination.  +urinary frequency.  Denies cp, malodorous urine, rashes, vaginal sores, sob, abd pain, leg swelling or other complaints. Pt with IUD in, does not get periods. Pt is sexually active, no new sexual partners. NKDA. 29 y/o F w pmhx of UTI, PID, kidney infection, pshx of cholecystectomy, p/w left sided lower back pain, progressing dysuria x2weeks. Pt with fever and chills 2 weeks ago, now resolved.  +mild vaginal bleeding when wiping after urination.  +urinary frequency.  Denies cp, malodorous urine, rashes, vaginal sores, sob, abd pain, leg swelling or other complaints. Pt with IUD in, does not get periods. Pt is sexually active, no new sexual partners. NKDA. 27 y/o F w pmhx of UTI, PID, kidney infection, pshx of cholecystectomy, p/w left sided lower back pain, progressing dysuria x2weeks. Pt with fever and chills 2 weeks ago, now resolved.  +mild hematuria when wiping after urination.  +urinary frequency.  Denies cp, malodorous urine, rashes, vaginal sores, sob, abd pain, leg swelling or other complaints. Pt with IUD in, does not get periods. Pt is sexually active, no new sexual partners. NKDA. 27 y/o F w pmhx of UTI, PID, kidney infection, pshx of cholecystectomy, p/w left sided lower back pain, progressing dysuria x2weeks. Pt with fever and chills 2 weeks ago, now resolved.  +mild spotting of blood when wiping after urination.  +urinary frequency.  Denies cp, malodorous urine, rashes, vaginal sores, sob, abd pain, leg swelling or other complaints. Pt with IUD in, does not get periods. Pt is sexually active, no new sexual partners. NKDA.

## 2019-05-03 LAB
CULTURE RESULTS: SIGNIFICANT CHANGE UP
SPECIMEN SOURCE: SIGNIFICANT CHANGE UP

## 2019-05-04 NOTE — ED POST DISCHARGE NOTE - DETAILS
LVM for pt to return call. - Ariela Chaidez PA-C RAHEEMM for pt to return call with  Jennifer, ID # [id number]. - Princess Savage PA-C 5/6/19: attempted to contact pt w/  [facility] ID #[id number] for pt to obtain results. No answer, left voicemessage w/ callback to admin line. If not return call by 1700 will send telegram as third attempt. - Sedrick Dempsey PA-C 5/6/19: Attempted to contact pt w/  [facility] ID #[id number] for pt to obtain results. No answer, left voicemessage w/ callback to admin line. If not return call by 1700 will send telegram as third attempt. - Sedrick Dempsey PA-C

## 2019-05-04 NOTE — ED POST DISCHARGE NOTE - OTHER COMMUNICATION
5/6/19 at 1705: ED demographic face sheet given to red  with instructions to send telegram to pt to obtain results. - ANETA CasillasC 5/6/19 at 1705: pt returned call, informed her of cx results as above. Pt still with pain, afebrile. Advised her to continue abx as prescribed and to repeat testing with PMD. Advised if worsening pain or fever/chills should return to ED. Pt acknowledged understanding and was appreciative of call. - Sedrick Dempsey PA-C 5/6/19 at 1705: pt returned call, informed her of cx results as above. Offered  but pt deferred, understands English well. Pt still with pain, afebrile. Advised her to continue abx as prescribed and to repeat testing with PMD. Advised if worsening pain or fever/chills should return to ED. Pt acknowledged understanding and was appreciative of call. - Sedrick Dempsey PA-C

## 2019-06-27 ENCOUNTER — RESULT REVIEW (OUTPATIENT)
Age: 29
End: 2019-06-27

## 2019-06-27 ENCOUNTER — MED ADMIN CHARGE (OUTPATIENT)
Age: 29
End: 2019-06-27

## 2019-06-27 ENCOUNTER — APPOINTMENT (OUTPATIENT)
Dept: OBGYN | Facility: CLINIC | Age: 29
End: 2019-06-27
Payer: COMMERCIAL

## 2019-06-27 VITALS — SYSTOLIC BLOOD PRESSURE: 122 MMHG | BODY MASS INDEX: 36.21 KG/M2 | WEIGHT: 198 LBS | DIASTOLIC BLOOD PRESSURE: 88 MMHG

## 2019-06-27 DIAGNOSIS — K64.9 UNSPECIFIED HEMORRHOIDS: ICD-10-CM

## 2019-06-27 DIAGNOSIS — Z30.430 ENCOUNTER FOR INSERTION OF INTRAUTERINE CONTRACEPTIVE DEVICE: ICD-10-CM

## 2019-06-27 PROCEDURE — 87086 URINE CULTURE/COLONY COUNT: CPT

## 2019-06-27 PROCEDURE — 87491 CHLMYD TRACH DNA AMP PROBE: CPT

## 2019-06-27 PROCEDURE — 99283 EMERGENCY DEPT VISIT LOW MDM: CPT

## 2019-06-27 PROCEDURE — 87624 HPV HI-RISK TYP POOLED RSLT: CPT

## 2019-06-27 PROCEDURE — 81001 URINALYSIS AUTO W/SCOPE: CPT

## 2019-06-27 PROCEDURE — 87591 N.GONORRHOEAE DNA AMP PROB: CPT

## 2019-06-27 PROCEDURE — 88175 CYTOPATH C/V AUTO FLUID REDO: CPT

## 2019-06-27 PROCEDURE — 90649 4VHPV VACCINE 3 DOSE IM: CPT | Mod: NC

## 2019-06-27 PROCEDURE — 99213 OFFICE O/P EST LOW 20 MIN: CPT | Mod: NC,25

## 2019-06-27 PROCEDURE — 90471 IMMUNIZATION ADMIN: CPT | Mod: NC

## 2019-06-27 RX ORDER — NITROFURANTOIN (MONOHYDRATE/MACROCRYSTALS) 25; 75 MG/1; MG/1
100 CAPSULE ORAL TWICE DAILY
Qty: 10 | Refills: 0 | Status: DISCONTINUED | COMMUNITY
Start: 2017-11-13 | End: 2019-06-27

## 2019-06-27 NOTE — PROCEDURE
[GC Chlamydia Culture] : GC Chlamydia Culture [Liquid Base] : liquid base [Cervical Pap Smear] : cervical Pap smear [No Complications] : there were no complications [Tolerated Well] : the patient tolerated the procedure well

## 2019-06-27 NOTE — COUNSELING
[Nutrition] : nutrition [Breast Self Exam] : breast self exam [Vitamins/Supplements] : vitamins/supplements [Exercise] : exercise [Fertility Options] : fertility options

## 2019-06-28 NOTE — HISTORY OF PRESENT ILLNESS
[unknown] : the patient is unsure of the date of her LMP [Sexually Active] : is sexually active [Contraception] : uses contraception [IUD] : has an intrauterine device [Male ___] : [unfilled] male [Regular Cycle Intervals] : periods have been irregular

## 2019-09-23 ENCOUNTER — APPOINTMENT (OUTPATIENT)
Dept: OBGYN | Facility: CLINIC | Age: 29
End: 2019-09-23

## 2019-09-25 ENCOUNTER — RESULT CHARGE (OUTPATIENT)
Age: 29
End: 2019-09-25

## 2019-09-25 ENCOUNTER — APPOINTMENT (OUTPATIENT)
Dept: OBGYN | Facility: CLINIC | Age: 29
End: 2019-09-25
Payer: MEDICAID

## 2019-09-25 ENCOUNTER — OUTPATIENT (OUTPATIENT)
Dept: OUTPATIENT SERVICES | Facility: HOSPITAL | Age: 29
LOS: 1 days | End: 2019-09-25
Payer: SELF-PAY

## 2019-09-25 DIAGNOSIS — Z90.49 ACQUIRED ABSENCE OF OTHER SPECIFIED PARTS OF DIGESTIVE TRACT: Chronic | ICD-10-CM

## 2019-09-25 DIAGNOSIS — N76.0 ACUTE VAGINITIS: ICD-10-CM

## 2019-09-25 DIAGNOSIS — Z32.00 ENCOUNTER FOR PREGNANCY TEST, RESULT UNKNOWN: ICD-10-CM

## 2019-09-25 DIAGNOSIS — N39.0 URINARY TRACT INFECTION, SITE NOT SPECIFIED: ICD-10-CM

## 2019-09-25 PROCEDURE — 81003 URINALYSIS AUTO W/O SCOPE: CPT

## 2019-09-25 PROCEDURE — G0463: CPT

## 2019-09-25 PROCEDURE — 99213 OFFICE O/P EST LOW 20 MIN: CPT | Mod: NC,GE

## 2019-09-25 PROCEDURE — 87186 SC STD MICRODIL/AGAR DIL: CPT

## 2019-09-25 PROCEDURE — 87086 URINE CULTURE/COLONY COUNT: CPT

## 2019-09-25 NOTE — END OF VISIT
[FreeTextEntry3] : Case discussed with resident, agree with management plan as above. 30yo  s/p mirena IUD removal 21 now attempting to conceive here for no menses since removal. Reports unclear UPT at home, UPT negative x2 here. Pt reported regular menses at baseline. Discussed that if menses do not return within the month, for pt to return for further workup of secondary amenorrhea. Continue PNV/folic acid. Remainder of plan as above.\par \par Rashmi Godoy MD\par  [] : Resident

## 2019-09-26 ENCOUNTER — LABORATORY RESULT (OUTPATIENT)
Age: 29
End: 2019-09-26

## 2019-09-26 LAB
HCG UR QL: NEGATIVE
QUALITY CONTROL: YES

## 2019-10-01 ENCOUNTER — MESSAGE (OUTPATIENT)
Age: 29
End: 2019-10-01

## 2019-10-08 ENCOUNTER — OUTPATIENT (OUTPATIENT)
Dept: OUTPATIENT SERVICES | Facility: HOSPITAL | Age: 29
LOS: 1 days | End: 2019-10-08
Payer: SELF-PAY

## 2019-10-08 ENCOUNTER — APPOINTMENT (OUTPATIENT)
Dept: OBGYN | Facility: CLINIC | Age: 29
End: 2019-10-08
Payer: MEDICAID

## 2019-10-08 VITALS — BODY MASS INDEX: 37.13 KG/M2 | DIASTOLIC BLOOD PRESSURE: 82 MMHG | SYSTOLIC BLOOD PRESSURE: 124 MMHG | WEIGHT: 203 LBS

## 2019-10-08 DIAGNOSIS — N76.0 ACUTE VAGINITIS: ICD-10-CM

## 2019-10-08 DIAGNOSIS — Z90.49 ACQUIRED ABSENCE OF OTHER SPECIFIED PARTS OF DIGESTIVE TRACT: Chronic | ICD-10-CM

## 2019-10-08 PROCEDURE — 99213 OFFICE O/P EST LOW 20 MIN: CPT | Mod: NC

## 2019-10-08 PROCEDURE — G0463: CPT

## 2019-10-18 DIAGNOSIS — N92.6 IRREGULAR MENSTRUATION, UNSPECIFIED: ICD-10-CM

## 2019-11-12 ENCOUNTER — OUTPATIENT (OUTPATIENT)
Dept: OUTPATIENT SERVICES | Facility: HOSPITAL | Age: 29
LOS: 1 days | End: 2019-11-12
Payer: SELF-PAY

## 2019-11-12 ENCOUNTER — LABORATORY RESULT (OUTPATIENT)
Age: 29
End: 2019-11-12

## 2019-11-12 ENCOUNTER — MED ADMIN CHARGE (OUTPATIENT)
Age: 29
End: 2019-11-12

## 2019-11-12 ENCOUNTER — RESULT CHARGE (OUTPATIENT)
Age: 29
End: 2019-11-12

## 2019-11-12 ENCOUNTER — APPOINTMENT (OUTPATIENT)
Dept: INTERNAL MEDICINE | Facility: CLINIC | Age: 29
End: 2019-11-12

## 2019-11-12 ENCOUNTER — RESULT REVIEW (OUTPATIENT)
Age: 29
End: 2019-11-12

## 2019-11-12 VITALS
DIASTOLIC BLOOD PRESSURE: 88 MMHG | HEIGHT: 62 IN | OXYGEN SATURATION: 98 % | HEART RATE: 66 BPM | WEIGHT: 204 LBS | SYSTOLIC BLOOD PRESSURE: 130 MMHG | BODY MASS INDEX: 37.54 KG/M2

## 2019-11-12 DIAGNOSIS — Z32.00 ENCOUNTER FOR PREGNANCY TEST, RESULT UNKNOWN: ICD-10-CM

## 2019-11-12 DIAGNOSIS — Z87.42 PERSONAL HISTORY OF OTHER DISEASES OF THE FEMALE GENITAL TRACT: ICD-10-CM

## 2019-11-12 DIAGNOSIS — Z01.419 ENCOUNTER FOR GYNECOLOGICAL EXAMINATION (GENERAL) (ROUTINE) W/OUT ABNORMAL FINDINGS: ICD-10-CM

## 2019-11-12 DIAGNOSIS — Z87.440 PERSONAL HISTORY OF URINARY (TRACT) INFECTIONS: ICD-10-CM

## 2019-11-12 DIAGNOSIS — Z34.80 ENCOUNTER FOR SUPERVISION OF OTHER NORMAL PREGNANCY, UNSPECIFIED TRIMESTER: ICD-10-CM

## 2019-11-12 DIAGNOSIS — I10 ESSENTIAL (PRIMARY) HYPERTENSION: ICD-10-CM

## 2019-11-12 DIAGNOSIS — Z87.898 PERSONAL HISTORY OF OTHER SPECIFIED CONDITIONS: ICD-10-CM

## 2019-11-12 DIAGNOSIS — Z90.49 ACQUIRED ABSENCE OF OTHER SPECIFIED PARTS OF DIGESTIVE TRACT: Chronic | ICD-10-CM

## 2019-11-12 LAB
ALBUMIN SERPL ELPH-MCNC: 4.8 G/DL — SIGNIFICANT CHANGE UP (ref 3.3–5)
ALP SERPL-CCNC: 118 U/L — SIGNIFICANT CHANGE UP (ref 40–120)
ALT FLD-CCNC: 18 U/L — SIGNIFICANT CHANGE UP (ref 10–45)
ANION GAP SERPL CALC-SCNC: 14 MMOL/L — SIGNIFICANT CHANGE UP (ref 5–17)
AST SERPL-CCNC: 19 U/L — SIGNIFICANT CHANGE UP (ref 10–40)
BILIRUB SERPL-MCNC: 0.2 MG/DL — SIGNIFICANT CHANGE UP (ref 0.2–1.2)
BUN SERPL-MCNC: 14 MG/DL — SIGNIFICANT CHANGE UP (ref 7–23)
CALCIUM SERPL-MCNC: 9.8 MG/DL — SIGNIFICANT CHANGE UP (ref 8.4–10.5)
CHLORIDE SERPL-SCNC: 102 MMOL/L — SIGNIFICANT CHANGE UP (ref 96–108)
CHOLEST SERPL-MCNC: 290 MG/DL — HIGH (ref 10–199)
CO2 SERPL-SCNC: 23 MMOL/L — SIGNIFICANT CHANGE UP (ref 22–31)
CREAT SERPL-MCNC: 0.7 MG/DL — SIGNIFICANT CHANGE UP (ref 0.5–1.3)
ESTIMATED AVERAGE GLUCOSE: 111 MG/DL — SIGNIFICANT CHANGE UP (ref 68–114)
GLUCOSE SERPL-MCNC: 80 MG/DL — SIGNIFICANT CHANGE UP (ref 70–99)
HBA1C BLD-MCNC: 5.5 % — SIGNIFICANT CHANGE UP (ref 4–5.6)
HCT VFR BLD CALC: 40.1 % — SIGNIFICANT CHANGE UP (ref 34.5–45)
HDLC SERPL-MCNC: 33 MG/DL — LOW
HGB BLD-MCNC: 12.9 G/DL — SIGNIFICANT CHANGE UP (ref 11.5–15.5)
LIPID PNL WITH DIRECT LDL SERPL: SIGNIFICANT CHANGE UP MG/DL
MCHC RBC-ENTMCNC: 27 PG — SIGNIFICANT CHANGE UP (ref 27–34)
MCHC RBC-ENTMCNC: 32.2 GM/DL — SIGNIFICANT CHANGE UP (ref 32–36)
MCV RBC AUTO: 83.9 FL — SIGNIFICANT CHANGE UP (ref 80–100)
PLATELET # BLD AUTO: 364 K/UL — SIGNIFICANT CHANGE UP (ref 150–400)
POTASSIUM SERPL-MCNC: 4 MMOL/L — SIGNIFICANT CHANGE UP (ref 3.5–5.3)
POTASSIUM SERPL-SCNC: 4 MMOL/L — SIGNIFICANT CHANGE UP (ref 3.5–5.3)
PROT SERPL-MCNC: 8 G/DL — SIGNIFICANT CHANGE UP (ref 6–8.3)
RBC # BLD: 4.78 M/UL — SIGNIFICANT CHANGE UP (ref 3.8–5.2)
RBC # FLD: 13.2 % — SIGNIFICANT CHANGE UP (ref 10.3–14.5)
SODIUM SERPL-SCNC: 139 MMOL/L — SIGNIFICANT CHANGE UP (ref 135–145)
TOTAL CHOLESTEROL/HDL RATIO MEASUREMENT: 8.8 RATIO — HIGH (ref 3.3–7.1)
TRIGL SERPL-MCNC: 644 MG/DL — HIGH (ref 10–149)
WBC # BLD: 9.25 K/UL — SIGNIFICANT CHANGE UP (ref 3.8–10.5)
WBC # FLD AUTO: 9.25 K/UL — SIGNIFICANT CHANGE UP (ref 3.8–10.5)

## 2019-11-12 PROCEDURE — 80061 LIPID PANEL: CPT

## 2019-11-12 PROCEDURE — 87389 HIV-1 AG W/HIV-1&-2 AB AG IA: CPT

## 2019-11-12 PROCEDURE — 85027 COMPLETE CBC AUTOMATED: CPT

## 2019-11-12 PROCEDURE — 80053 COMPREHEN METABOLIC PANEL: CPT

## 2019-11-12 PROCEDURE — 87186 SC STD MICRODIL/AGAR DIL: CPT

## 2019-11-12 PROCEDURE — 87086 URINE CULTURE/COLONY COUNT: CPT

## 2019-11-12 PROCEDURE — G0463: CPT

## 2019-11-12 PROCEDURE — 83036 HEMOGLOBIN GLYCOSYLATED A1C: CPT

## 2019-11-12 RX ORDER — NITROFURANTOIN (MONOHYDRATE/MACROCRYSTALS) 25; 75 MG/1; MG/1
100 CAPSULE ORAL TWICE DAILY
Qty: 14 | Refills: 0 | Status: DISCONTINUED | COMMUNITY
Start: 2019-10-01 | End: 2019-11-12

## 2019-11-12 NOTE — PAST MEDICAL HISTORY
[Approximately ___] : the LMP was approximately [unfilled] [Menstruating] : menstruating [Irregular Cycle Intervals] : are  irregular

## 2019-11-13 LAB — HIV 1+2 AB+HIV1 P24 AG SERPL QL IA: SIGNIFICANT CHANGE UP

## 2019-11-17 NOTE — REVIEW OF SYSTEMS
[Fatigue] : fatigue [Recent Change In Weight] : ~T recent weight change [Nausea] : nausea [Nocturia] : nocturia [Frequency] : frequency [Headache] : headache [Fever] : no fever [Hot Flashes] : no hot flashes [Chills] : no chills [Night Sweats] : no night sweats [Discharge] : no discharge [Redness] : no redness [Pain] : no pain [Vision Problems] : no vision problems [Hearing Loss] : no hearing loss [Earache] : no earache [Itching] : no itching [Nasal Discharge] : no nasal discharge [Nosebleed] : no nosebleeds [Sore Throat] : no sore throat [Postnasal Drip] : no postnasal drip [Chest Pain] : no chest pain [Palpitations] : no palpitations [Leg Claudication] : no leg claudication [Orthopnea] : no orthopnea [Cough] : no cough [Paroxysmal Nocturnal Dyspnea] : no paroxysmal nocturnal dyspnea [Shortness Of Breath] : no shortness of breath [Abdominal Pain] : no abdominal pain [Constipation] : no constipation [Vomiting] : no vomiting [Diarrhea] : diarrhea [Heartburn] : no heartburn [Melena] : no melena [Dysuria] : no dysuria [Incontinence] : no incontinence [Hematuria] : no hematuria [Poor Libido] : libido not poor [Dysmenorrhea] : no dysmenorrhea [Vaginal Discharge] : no vaginal discharge [Joint Pain] : no joint pain [Joint Stiffness] : no joint stiffness [Joint Swelling] : no joint swelling [Muscle Weakness] : no muscle weakness [Muscle Pain] : no muscle pain [Dizziness] : no dizziness [Back Pain] : no back pain [Fainting] : no fainting [Memory Loss] : no memory loss [Confusion] : no confusion [Unsteady Walking] : no ataxia [Suicidal] : not suicidal [Anxiety] : no anxiety [Insomnia] : no insomnia [Easy Bleeding] : no easy bleeding [Depression] : no depression [Easy Bruising] : no easy bruising [Swollen Glands] : no swollen glands

## 2019-11-17 NOTE — PLAN
[FreeTextEntry1] : #headache \par -ddx include migraine vs sleep apnea \par -rx naproxen 500mg BID PRN \par -work up for sleep apnea; STOP-BANG +; referral for sleep study provided.\par -pregnacy test negative in office \par \par #polyuria\par could be in setting of UTI vs increased water intake (1gallon daily as per pt)\par UA in office + for trace blood and NATALYA; however patient w/o any other constitutional signs, will hold off on abx for now, send urine for culture. recommend decreasing water intake, also check a1c for concern of diabetes. \par \par #obesity\par -weight management referral provided. will task Apoorva to reach out for appointment.\par \par #dandruff\par -selenium sulfide 2.5% rx'd \par \par #HCM \par influenza vaccine administered to left deltoid\par check cbc, cmp, a1c, lipid profile.

## 2019-11-17 NOTE — PHYSICAL EXAM
[Well Nourished] : well nourished [No Acute Distress] : no acute distress [Well Developed] : well developed [Well-Appearing] : well-appearing [Normal Sclera/Conjunctiva] : normal sclera/conjunctiva [PERRL] : pupils equal round and reactive to light [EOMI] : extraocular movements intact [Normal Outer Ear/Nose] : the outer ears and nose were normal in appearance [Normal Oropharynx] : the oropharynx was normal [No JVD] : no jugular venous distention [Supple] : supple [No Lymphadenopathy] : no lymphadenopathy [Thyroid Normal, No Nodules] : the thyroid was normal and there were no nodules present [No Accessory Muscle Use] : no accessory muscle use [No Respiratory Distress] : no respiratory distress  [Clear to Auscultation] : lungs were clear to auscultation bilaterally [Normal Rate] : normal rate  [Regular Rhythm] : with a regular rhythm [Normal S1, S2] : normal S1 and S2 [No Murmur] : no murmur heard [No Carotid Bruits] : no carotid bruits [No Abdominal Bruit] : a ~M bruit was not heard ~T in the abdomen [Pedal Pulses Present] : the pedal pulses are present [No Varicosities] : no varicosities [No Edema] : there was no peripheral edema [No Extremity Clubbing/Cyanosis] : no extremity clubbing/cyanosis [No Palpable Aorta] : no palpable aorta [Soft] : abdomen soft [Non Tender] : non-tender [Non-distended] : non-distended [No Masses] : no abdominal mass palpated [No HSM] : no HSM [Normal Bowel Sounds] : normal bowel sounds [Normal Posterior Cervical Nodes] : no posterior cervical lymphadenopathy [Normal Anterior Cervical Nodes] : no anterior cervical lymphadenopathy [No Spinal Tenderness] : no spinal tenderness [No CVA Tenderness] : no CVA  tenderness [No Joint Swelling] : no joint swelling [Grossly Normal Strength/Tone] : grossly normal strength/tone [No Rash] : no rash [Coordination Grossly Intact] : coordination grossly intact [No Focal Deficits] : no focal deficits [Normal Gait] : normal gait [Deep Tendon Reflexes (DTR)] : deep tendon reflexes were 2+ and symmetric [Normal Affect] : the affect was normal [Normal Insight/Judgement] : insight and judgment were intact [de-identified] : obese [de-identified] : flakey scalp no hair loss

## 2019-11-17 NOTE — HISTORY OF PRESENT ILLNESS
[FreeTextEntry1] : Headache x3 weeks [de-identified] : 29F hx external hemorrhoids, LIGSIL (low grade squamous intraepithelial dysplasia), obesity, , presenting to the clinic for CPE. \par \par c/o headache x3 weeks, moderate intensity, starts at middle of forehead and spreads to center of head and b/l side throbbing in nature,. w/ nausea no vomiting, exacerbated w/ bright lights. initially lasted hours now constant for days. Tylenol 1500mg worked at first then switched to advil 600mg as it stopped being effective, mild relief w/ advil. Now headaches are daily. States she had increased stress in the last few weeks w/ work and children. Drinks 2 cups of coffee daily w/o relief. States she feels tired during the day and takes a nap around 1:30 pm; sleeps from 9pm to 6:40am w/ occasional interruptions 2/2 need to urinate and occasional headaches. Patient drinks 1 gallon of water a day to help prevent UTIs. unknown if has witnessed apneic episodes or snoring.

## 2019-11-17 NOTE — HEALTH RISK ASSESSMENT
[Good] : ~his/her~  mood as  good [Yes] : Yes [Monthly or less (1 pt)] : Monthly or less (1 point) [1 or 2 (0 pts)] : 1 or 2 (0 points) [Never (0 pts)] : Never (0 points) [No] : In the past 12 months have you used drugs other than those required for medical reasons? No [No falls in past year] : Patient reported no falls in the past year [0] : 2) Feeling down, depressed, or hopeless: Not at all (0) [HIV Test offered] : HIV Test offered [With Family] : lives with family [None] : None [Employed] : employed [] :  [# Of Children ___] : has [unfilled] children [Sexually Active] : sexually active [Fully functional (bathing, dressing, toileting, transferring, walking, feeding)] : Fully functional (bathing, dressing, toileting, transferring, walking, feeding) [Feels Safe at Home] : Feels safe at home [Fully functional (using the telephone, shopping, preparing meals, housekeeping, doing laundry, using] : Fully functional and needs no help or supervision to perform IADLs (using the telephone, shopping, preparing meals, housekeeping, doing laundry, using transportation, managing medications and managing finances) [] : No [Change in mental status noted] : No change in mental status noted [Behavior] : denies difficulty with behavior [Language] : denies difficulty with language [Handling Complex Tasks] : denies difficulty handling complex tasks [Learning/Retaining New Information] : denies difficulty learning/retaining new information [Spatial Ability and Orientation] : denies difficulty with spatial ability and orientation [Reasoning] : denies difficulty with reasoning [Reports changes in hearing] : Reports no changes in hearing [High Risk Behavior] : no high risk behavior [Reports changes in dental health] : Reports no changes in dental health [Reports changes in vision] : Reports no changes in vision [Reports normal functional visual acuity (ie: able to read med bottle)] : Reports poor functional visual acuity.  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

## 2019-11-19 DIAGNOSIS — R35.8 OTHER POLYURIA: ICD-10-CM

## 2019-11-19 DIAGNOSIS — E66.9 OBESITY, UNSPECIFIED: ICD-10-CM

## 2019-11-19 DIAGNOSIS — R51 HEADACHE: ICD-10-CM

## 2019-11-19 DIAGNOSIS — L21.0 SEBORRHEA CAPITIS: ICD-10-CM

## 2019-12-16 ENCOUNTER — APPOINTMENT (OUTPATIENT)
Dept: OBGYN | Facility: CLINIC | Age: 29
End: 2019-12-16
Payer: COMMERCIAL

## 2019-12-16 ENCOUNTER — OUTPATIENT (OUTPATIENT)
Dept: OUTPATIENT SERVICES | Facility: HOSPITAL | Age: 29
LOS: 1 days | End: 2019-12-16
Payer: MEDICAID

## 2019-12-16 ENCOUNTER — RESULT CHARGE (OUTPATIENT)
Age: 29
End: 2019-12-16

## 2019-12-16 ENCOUNTER — LABORATORY RESULT (OUTPATIENT)
Age: 29
End: 2019-12-16

## 2019-12-16 ENCOUNTER — RESULT REVIEW (OUTPATIENT)
Age: 29
End: 2019-12-16

## 2019-12-16 VITALS — BODY MASS INDEX: 38.14 KG/M2 | WEIGHT: 208.5 LBS | DIASTOLIC BLOOD PRESSURE: 80 MMHG | SYSTOLIC BLOOD PRESSURE: 130 MMHG

## 2019-12-16 DIAGNOSIS — R10.2 PELVIC AND PERINEAL PAIN: ICD-10-CM

## 2019-12-16 DIAGNOSIS — Z34.80 ENCOUNTER FOR SUPERVISION OF OTHER NORMAL PREGNANCY, UNSPECIFIED TRIMESTER: ICD-10-CM

## 2019-12-16 DIAGNOSIS — N76.0 ACUTE VAGINITIS: ICD-10-CM

## 2019-12-16 DIAGNOSIS — Z34.90 ENCOUNTER FOR SUPERVISION OF NORMAL PREGNANCY, UNSPECIFIED, UNSPECIFIED TRIMESTER: ICD-10-CM

## 2019-12-16 DIAGNOSIS — Z90.49 ACQUIRED ABSENCE OF OTHER SPECIFIED PARTS OF DIGESTIVE TRACT: Chronic | ICD-10-CM

## 2019-12-16 DIAGNOSIS — R35.8 OTHER POLYURIA: ICD-10-CM

## 2019-12-16 DIAGNOSIS — O36.80X0 PREGNANCY WITH INCONCLUSIVE FETAL VIABILITY, NOT APPLICABLE OR UNSPECIFIED: ICD-10-CM

## 2019-12-16 LAB
HCG SERPL-ACNC: 5629 MIU/ML — HIGH
HCG UR QL: POSITIVE
PROGEST SERPL-MCNC: 13.8 NG/ML — SIGNIFICANT CHANGE UP
QUALITY CONTROL: NORMAL

## 2019-12-16 PROCEDURE — 76830 TRANSVAGINAL US NON-OB: CPT

## 2019-12-16 PROCEDURE — 99214 OFFICE O/P EST MOD 30 MIN: CPT | Mod: NC,25

## 2019-12-16 PROCEDURE — 84702 CHORIONIC GONADOTROPIN TEST: CPT

## 2019-12-16 PROCEDURE — G0463: CPT | Mod: 25

## 2019-12-16 PROCEDURE — 87186 SC STD MICRODIL/AGAR DIL: CPT

## 2019-12-16 PROCEDURE — 87086 URINE CULTURE/COLONY COUNT: CPT

## 2019-12-16 PROCEDURE — 81003 URINALYSIS AUTO W/O SCOPE: CPT

## 2019-12-16 PROCEDURE — 76830 TRANSVAGINAL US NON-OB: CPT | Mod: 26,NC

## 2019-12-16 PROCEDURE — 84144 ASSAY OF PROGESTERONE: CPT

## 2019-12-18 ENCOUNTER — RESULT REVIEW (OUTPATIENT)
Age: 29
End: 2019-12-18

## 2019-12-18 NOTE — HISTORY OF PRESENT ILLNESS
[Definite:  ___ (Date)] : the last menstrual period was [unfilled] [Sexually Active] : is sexually active [Monogamous] : is monogamous [Male ___] : [unfilled] male [Normal Amount/Duration] : was abnormal [Spotting Between  Menses] : no spotting between menses [Regular Cycle Intervals] : periods have been irregular

## 2019-12-18 NOTE — PROCEDURE
[Intrauterine Pregnancy] : no intrauterine pregnancy [Fetal Heart] : no fetal heart [WNL] : Transvaginal OB Sonogram - abnormalities noted [Yolk Sac] : no yolk sac [FreeTextEntry1] : Uncertain LMP -irregular since Mirena removal

## 2019-12-19 ENCOUNTER — EMERGENCY (EMERGENCY)
Facility: HOSPITAL | Age: 29
LOS: 1 days | Discharge: HOME | End: 2019-12-19
Attending: EMERGENCY MEDICINE
Payer: MEDICAID

## 2019-12-19 VITALS
HEART RATE: 75 BPM | SYSTOLIC BLOOD PRESSURE: 114 MMHG | DIASTOLIC BLOOD PRESSURE: 72 MMHG | WEIGHT: 195.11 LBS | RESPIRATION RATE: 16 BRPM | TEMPERATURE: 97 F | OXYGEN SATURATION: 99 % | HEIGHT: 65 IN

## 2019-12-19 VITALS
SYSTOLIC BLOOD PRESSURE: 108 MMHG | RESPIRATION RATE: 16 BRPM | TEMPERATURE: 98 F | OXYGEN SATURATION: 98 % | DIASTOLIC BLOOD PRESSURE: 77 MMHG | HEART RATE: 78 BPM

## 2019-12-19 DIAGNOSIS — Z90.49 ACQUIRED ABSENCE OF OTHER SPECIFIED PARTS OF DIGESTIVE TRACT: Chronic | ICD-10-CM

## 2019-12-19 DIAGNOSIS — Z3A.01 LESS THAN 8 WEEKS GESTATION OF PREGNANCY: ICD-10-CM

## 2019-12-19 LAB
ALBUMIN SERPL ELPH-MCNC: 4.3 G/DL — SIGNIFICANT CHANGE UP (ref 3.3–5)
ALP SERPL-CCNC: 96 U/L — SIGNIFICANT CHANGE UP (ref 40–120)
ALT FLD-CCNC: 11 U/L — SIGNIFICANT CHANGE UP (ref 10–45)
ANION GAP SERPL CALC-SCNC: 15 MMOL/L — SIGNIFICANT CHANGE UP (ref 5–17)
APPEARANCE UR: CLEAR — SIGNIFICANT CHANGE UP
APTT BLD: 29.9 SEC — SIGNIFICANT CHANGE UP (ref 27.5–36.3)
AST SERPL-CCNC: 12 U/L — SIGNIFICANT CHANGE UP (ref 10–40)
BACTERIA # UR AUTO: ABNORMAL
BASOPHILS # BLD AUTO: 0.03 K/UL — SIGNIFICANT CHANGE UP (ref 0–0.2)
BASOPHILS NFR BLD AUTO: 0.3 % — SIGNIFICANT CHANGE UP (ref 0–2)
BILIRUB SERPL-MCNC: 0.1 MG/DL — LOW (ref 0.2–1.2)
BILIRUB UR-MCNC: NEGATIVE — SIGNIFICANT CHANGE UP
BLD GP AB SCN SERPL QL: NEGATIVE — SIGNIFICANT CHANGE UP
BUN SERPL-MCNC: 12 MG/DL — SIGNIFICANT CHANGE UP (ref 7–23)
CALCIUM SERPL-MCNC: 9.8 MG/DL — SIGNIFICANT CHANGE UP (ref 8.4–10.5)
CHLORIDE SERPL-SCNC: 104 MMOL/L — SIGNIFICANT CHANGE UP (ref 96–108)
CO2 SERPL-SCNC: 20 MMOL/L — LOW (ref 22–31)
COLOR SPEC: SIGNIFICANT CHANGE UP
CREAT SERPL-MCNC: 0.66 MG/DL — SIGNIFICANT CHANGE UP (ref 0.5–1.3)
DIFF PNL FLD: NEGATIVE — SIGNIFICANT CHANGE UP
EOSINOPHIL # BLD AUTO: 0.09 K/UL — SIGNIFICANT CHANGE UP (ref 0–0.5)
EOSINOPHIL NFR BLD AUTO: 1 % — SIGNIFICANT CHANGE UP (ref 0–6)
EPI CELLS # UR: 2 /HPF — SIGNIFICANT CHANGE UP
GAS PNL BLDV: SIGNIFICANT CHANGE UP
GLUCOSE SERPL-MCNC: 110 MG/DL — HIGH (ref 70–99)
GLUCOSE UR QL: NEGATIVE — SIGNIFICANT CHANGE UP
HCG SERPL-ACNC: HIGH MIU/ML
HCT VFR BLD CALC: 34.9 % — SIGNIFICANT CHANGE UP (ref 34.5–45)
HGB BLD-MCNC: 11.6 G/DL — SIGNIFICANT CHANGE UP (ref 11.5–15.5)
HYALINE CASTS # UR AUTO: 1 /LPF — SIGNIFICANT CHANGE UP (ref 0–2)
IMM GRANULOCYTES NFR BLD AUTO: 0.4 % — SIGNIFICANT CHANGE UP (ref 0–1.5)
INR BLD: 1.07 RATIO — SIGNIFICANT CHANGE UP (ref 0.88–1.16)
KETONES UR-MCNC: NEGATIVE — SIGNIFICANT CHANGE UP
LEUKOCYTE ESTERASE UR-ACNC: ABNORMAL
LIDOCAIN IGE QN: 26 U/L — SIGNIFICANT CHANGE UP (ref 7–60)
LYMPHOCYTES # BLD AUTO: 2.84 K/UL — SIGNIFICANT CHANGE UP (ref 1–3.3)
LYMPHOCYTES # BLD AUTO: 30.2 % — SIGNIFICANT CHANGE UP (ref 13–44)
MAGNESIUM SERPL-MCNC: 2 MG/DL — SIGNIFICANT CHANGE UP (ref 1.6–2.6)
MCHC RBC-ENTMCNC: 26.9 PG — LOW (ref 27–34)
MCHC RBC-ENTMCNC: 33.2 GM/DL — SIGNIFICANT CHANGE UP (ref 32–36)
MCV RBC AUTO: 80.8 FL — SIGNIFICANT CHANGE UP (ref 80–100)
MONOCYTES # BLD AUTO: 0.45 K/UL — SIGNIFICANT CHANGE UP (ref 0–0.9)
MONOCYTES NFR BLD AUTO: 4.8 % — SIGNIFICANT CHANGE UP (ref 2–14)
NEUTROPHILS # BLD AUTO: 5.94 K/UL — SIGNIFICANT CHANGE UP (ref 1.8–7.4)
NEUTROPHILS NFR BLD AUTO: 63.3 % — SIGNIFICANT CHANGE UP (ref 43–77)
NITRITE UR-MCNC: NEGATIVE — SIGNIFICANT CHANGE UP
NRBC # BLD: 0 /100 WBCS — SIGNIFICANT CHANGE UP (ref 0–0)
PH UR: 5.5 — SIGNIFICANT CHANGE UP (ref 5–8)
PLATELET # BLD AUTO: 295 K/UL — SIGNIFICANT CHANGE UP (ref 150–400)
POTASSIUM SERPL-MCNC: 4 MMOL/L — SIGNIFICANT CHANGE UP (ref 3.5–5.3)
POTASSIUM SERPL-SCNC: 4 MMOL/L — SIGNIFICANT CHANGE UP (ref 3.5–5.3)
PROT SERPL-MCNC: 7.9 G/DL — SIGNIFICANT CHANGE UP (ref 6–8.3)
PROT UR-MCNC: NEGATIVE — SIGNIFICANT CHANGE UP
PROTHROM AB SERPL-ACNC: 12.2 SEC — SIGNIFICANT CHANGE UP (ref 10–12.9)
RBC # BLD: 4.32 M/UL — SIGNIFICANT CHANGE UP (ref 3.8–5.2)
RBC # FLD: 13.6 % — SIGNIFICANT CHANGE UP (ref 10.3–14.5)
RBC CASTS # UR COMP ASSIST: 2 /HPF — SIGNIFICANT CHANGE UP (ref 0–4)
RH IG SCN BLD-IMP: POSITIVE — SIGNIFICANT CHANGE UP
SODIUM SERPL-SCNC: 139 MMOL/L — SIGNIFICANT CHANGE UP (ref 135–145)
SP GR SPEC: 1.02 — SIGNIFICANT CHANGE UP (ref 1.01–1.02)
UROBILINOGEN FLD QL: NEGATIVE — SIGNIFICANT CHANGE UP
WBC # BLD: 9.39 K/UL — SIGNIFICANT CHANGE UP (ref 3.8–10.5)
WBC # FLD AUTO: 9.39 K/UL — SIGNIFICANT CHANGE UP (ref 3.8–10.5)
WBC UR QL: 9 /HPF — HIGH (ref 0–5)

## 2019-12-19 PROCEDURE — 99284 EMERGENCY DEPT VISIT MOD MDM: CPT

## 2019-12-19 PROCEDURE — 93975 VASCULAR STUDY: CPT | Mod: 26

## 2019-12-19 PROCEDURE — 76817 TRANSVAGINAL US OBSTETRIC: CPT | Mod: 26

## 2019-12-19 RX ORDER — ACETAMINOPHEN 500 MG
650 TABLET ORAL ONCE
Refills: 0 | Status: COMPLETED | OUTPATIENT
Start: 2019-12-19 | End: 2019-12-19

## 2019-12-19 RX ADMIN — Medication 650 MILLIGRAM(S): at 20:48

## 2019-12-19 NOTE — ED PROVIDER NOTE - PHYSICAL EXAMINATION
General: well appearing, interactive, well nourished, NAD  HEENT: pupils equal and reactive, normal oropharynx, normal external ears bilaterally   Cardiac: RRR, no MRG appreciated  Resp: lungs clear to auscultation bilaterally, symmetric chest wall rise  Abd: soft, nontender, distended  : no CVA tenderness  Neuro: Moving all extremities  Skin:  normal color for race

## 2019-12-19 NOTE — ED PROVIDER NOTE - NS ED ROS FT
CONSTITUTIONAL: No fevers, no chills  Eyes: No vision changes  Cardiovascular: No Chest pain  Respiratory: No SOB  Gastrointestinal: No n/v/d, no abd pain  Genitourinary: no dysuria, no hematuria  SKIN: no rashes.  NEURO: no headache,  PSYCHIATRIC: no known mental health issues.

## 2019-12-19 NOTE — CONSULT NOTE ADULT - ASSESSMENT
29  @ 6w6d by LMP who initially presented for PUL, now with intrauterine pregnancy based on sonogram

## 2019-12-19 NOTE — ED PROVIDER NOTE - OBJECTIVE STATEMENT
29 year old F (A0) with no significant pmhx or pshx presents to ED referred by OB to r/o ectopic. Pt found out she was pregnant x2 days ago, believes that she may be 3-4 weeks pregnant. Pt was seen x4 days ago by clinic because she had cramping abd pain x3 weeks, similar to period cramps. Pt states that she had transvaginal US, but pregnancy could not be IUP confirmed. Pt had urine and blood tests performed, and told that she had elevated HCG levels. Pt endorses nausea and vomiting. Denies vaginal bleeding, vaginal discharge, back pain, fever. LMP . Review of outpt HI note, beta greater than 2000, r/o ectopic. Murina removed on 19. 28yo F () no significant pmhx reffered by OB to r/o ectopic     Had TVUS performed on monday, in addition to "blood test" yesterday, due to results of that blood test sent pt to ED. No abd pain, no bleeding vaginally. No burning on urination. 3 pregnancies previously with no issues. No N/V/D, NO f/c.   Found out pregnancy 1 week ago, LMP .    Follows @ [facility], sees different OB each time   No surgeries  No meds  NDKA 30yo F () no significant pmhx referred by OB to r/o ectopic     Had TVUS performed on monday, in addition to "blood test" yesterday, due to results of that blood test sent pt to ED. No abd pain, no bleeding vaginally. No burning on urination. 3 pregnancies previously with no issues. No N/V/D, NO f/c.   Found out pregnancy 1 week ago, LMP .    Follows @ [address] [facility], sees different OB each time   No surgeries  No meds  NDKA

## 2019-12-19 NOTE — CONSULT NOTE ADULT - SUBJECTIVE AND OBJECTIVE BOX
OBGYN Consult   29y  LMP 19 (6w6d by LMP) presents for follow up of pregnancy of unknown location. This is a desired planned pregnancy.   Today she denies abdominal pain or bleeding. She reports mild intermittent lower cramping. Reports nausea no vomiting. Denies CP/SOB/GIRARD/dizziness/fevers/chills.     [facility] trend: 5629 ()->[phone] ()->[phone] ()    OB/GYN HISTORY: FT  x3   PAST MEDICAL & SURGICAL HISTORY:  Urinary tract infection  PID (pelvic inflammatory disease)  S/P cholecystectomy    Allergies    No Known Allergies      FAMILY HISTORY:  No pertinent family history       Vital Signs Last 24 Hrs  T(C): 36.6 (19 Dec 2019 17:00), Max: 36.6 (19 Dec 2019 17:00)  T(F): 97.8 (19 Dec 2019 17:00), Max: 97.8 (19 Dec 2019 17:00)  HR: 78 (19 Dec 2019 17:00) (75 - 78)  BP: 108/77 (19 Dec 2019 17:00) (108/77 - 114/72)  RR: 16 (19 Dec 2019 17:00) (16 - 16)  SpO2: 98% (19 Dec 2019 17:00) (98% - 99%)    PHYSICAL EXAM:      Constitutional: alert and oriented x 3    Respiratory: clear    Cardiovascular: regular rate and rhythm    Gastrointestinal: soft, obese, non tender, + bowel sounds. No hepatosplenomegaly, no palpable masses    Genitourinary: NEFG  Cervix: closed/ long, no CMT, no vaginal bleeding  Uterus: normal size, non tender  Adnexa: non tender, no palpable masses    Extremities: NTBL        LABS:                        11.6   9.39  )-----------( 295      ( 19 Dec 2019 17:20 )             34.9         139  |  104  |  12  ----------------------------<  110<H>  4.0   |  20<L>  |  0.66    Ca    9.8      19 Dec 2019 17:20  Mg     2.0         TPro  7.9  /  Alb  4.3  /  TBili  0.1<L>  /  DBili  x   /  AST  12  /  ALT  11  /  AlkPhos  96      PT/INR - ( 19 Dec 2019 17:20 )   PT: 12.2 sec;   INR: 1.07 ratio         PTT - ( 19 Dec 2019 17:20 )  PTT:29.9 sec  Urinalysis Basic - ( 19 Dec 2019 17:42 )    Color: Light Yellow / Appearance: Clear / S.019 / pH: x  Gluc: x / Ketone: Negative  / Bili: Negative / Urobili: Negative   Blood: x / Protein: Negative / Nitrite: Negative   Leuk Esterase: Moderate / RBC: 2 /hpf / WBC 9 /HPF   Sq Epi: x / Non Sq Epi: 2 /hpf / Bacteria: Many        Blood Type: O Positive      RADIOLOGY & ADDITIONAL STUDIES:  full report pending on TVUS, however gestational sac with yolk sac and faint fetal pole noted on prelim read

## 2019-12-19 NOTE — ED PROVIDER NOTE - NSFOLLOWUPCLINICS_GEN_ALL_ED_FT
[facility]  OB/GYN & Surg  [address] [place] [address]  Phone: [phone]  Fax:   Follow Up Time:

## 2019-12-19 NOTE — ED PROVIDER NOTE - RAPID ASSESSMENT
30 y/o female with no PMHx recently started on Trulicity for weight loss 3 weeks ago (takes injectable once weekly) presented to the ED with constant epigastric pain x5 days. Patient describes pain to be burning, radiates to the back, exacerbated with eating, took zofran once with minimal improvement of symptoms, took pepcid BID with no improvement of symptoms. Patient has not eaten 2/2 pain and has not slept 2/2 pain. Patient was sent in by PMD Dr. Lim concerning for pancreatitis as that can be a side effect if taking trulicity. Feels like urine is darker than baseline. Patient denied fever, hematuria, dysuria, diarrhea, recent antibiotics, headache, CP, SOB, vaginal discharge . Recently discontinued birth control last week and had period 5 days ago.    PARVEEN Hull: Patient seen by myself in triage area for rapid assessment only. Full H&P to be performed in main emergency room by ER providers. 29 year old F (A0) with no significant pmhx or pshx presents to ED referred by OB to r/o ectopic. Pt found out she was pregnant x2 days ago, believes that she may be 3-4 weeks pregnant. Pt was seen x4 days ago by clinic because she had cramping abd pain x3 weeks, similar to period cramps. Pt states that she had transvaginal US, but pregnancy could not be IUP confirmed. Pt had urine and blood tests performed, and told that she had elevated HCG levels. Pt endorses nausea and vomiting. Denies vaginal bleeding, vaginal discharge, back pain, fever. LMP . Review of outpt HI note, beta greater than 2000, r/o ectopic. Murina removed on 19.    PARVEEN Hull: Patient seen by myself in triage area for rapid assessment only. Full H&P to be performed in main emergency room by ER providers.

## 2019-12-19 NOTE — ED PROVIDER NOTE - NS_EDPROVIDERDISPOUSERTYPE_ED_A_ED
I have personally evaluated and examined the patient. The Attending was available to me as a supervising provider if needed. Scribe Attestation (For Scribes USE Only).../I have personally evaluated and examined the patient. The Attending was available to me as a supervising provider if needed. Scribe Attestation (For Scribes USE Only).../Attending Attestation (For Attendings USE Only).../I have personally evaluated and examined the patient. The Attending was available to me as a supervising provider if needed.

## 2019-12-19 NOTE — ED PROVIDER NOTE - PATIENT PORTAL LINK FT
You can access the FollowMyHealth Patient Portal offered by [facility] by registering at the following website: [email or web address]. By joining [employer]’s FollowMyHealth portal, you will also be able to view your health information using other applications (apps) compatible with our system.

## 2019-12-19 NOTE — ED PROVIDER NOTE - PROGRESS NOTE DETAILS
Yosvany PGY1: Ultrasound with IUP. Patient evaluated by OBGYN and to follow up with the clinic in 1 week.

## 2019-12-19 NOTE — ED PROVIDER NOTE - NSFOLLOWUPINSTRUCTIONS_ED_ALL_ED_FT
You were seen in the ED and found to have an intrauterine pregnancy. Please follow up with the OBGYN clinic in 1 week. Return for worsening pain, fever, chills, nausea, vomiting, bleeding, or other concerning symptoms.

## 2019-12-19 NOTE — ED ADULT TRIAGE NOTE - CHIEF COMPLAINT QUOTE
sent in by OB clinic to r/o ectopic pregnancy, abdominal cramping, denies vaginal bleeding/discharge

## 2019-12-19 NOTE — CONSULT NOTE ADULT - PROBLEM SELECTOR RECOMMENDATION 9
-follow up in 1 week in the clinic for dating/viability and to establish prenatal care  -start PNV  d/w dr yanely Ho PGY4

## 2019-12-19 NOTE — ED PROVIDER NOTE - CLINICAL SUMMARY MEDICAL DECISION MAKING FREE TEXT BOX
Joseph PGY-2:  Young F pregnant LMP 11/1 found to have elevated beta with no visible IUP on monday now sent in as betahcg >5000, found to be >10k from labs sent in waiting room today, will assess for IUP, if continues to have no IUP will consult OBGYN to further manage Ectopic Young F pregnant LMP 11/1 found to have elevated beta with no visible IUP on monday now sent in as betahcg >5000, found to be >10k from labs sent in waiting room today, will assess for IUP, if continues to have no IUP will consult OBGYN to further manage due to concern for Ectopic  -ZR

## 2019-12-20 RX ORDER — NAPROXEN 500 MG/1
500 TABLET ORAL TWICE DAILY
Qty: 60 | Refills: 0 | Status: DISCONTINUED | COMMUNITY
Start: 2019-11-12 | End: 2019-12-20

## 2019-12-21 NOTE — ED POST DISCHARGE NOTE - OTHER COMMUNICATION
12/24/2019: lft vm and also gave info to red desk to sent certified letter. - Princess Savage PA-C 12/24/2019: lft vm and also gave info to red [employer] to send certified letter. - Princess Savage PA-C

## 2019-12-21 NOTE — ED POST DISCHARGE NOTE - DETAILS
12/21/19: pt currently pregnant sent to ED to r/o ectopic - pt was not discharged on antibiotics. LVM explaining I had test results to discuss and gave callback number. Need for call back to tx UTI in pregnancy. 12/22/19: m to call back admin line - Princess Savage PA-C 12/23/19 left vm to call back admin line. given patient is pregnant and needs treatment, will make a 4th attempt. -Lyric Hollis PA-C

## 2019-12-23 ENCOUNTER — RESULT REVIEW (OUTPATIENT)
Age: 29
End: 2019-12-23

## 2019-12-23 LAB — HCG SERPL-MCNC: 7658 MIU/ML

## 2019-12-26 ENCOUNTER — APPOINTMENT (OUTPATIENT)
Dept: OBGYN | Facility: CLINIC | Age: 29
End: 2019-12-26
Payer: COMMERCIAL

## 2019-12-26 ENCOUNTER — FORM ENCOUNTER (OUTPATIENT)
Age: 29
End: 2019-12-26

## 2019-12-26 ENCOUNTER — OUTPATIENT (OUTPATIENT)
Dept: OUTPATIENT SERVICES | Facility: HOSPITAL | Age: 29
LOS: 1 days | End: 2019-12-26
Payer: MEDICAID

## 2019-12-26 DIAGNOSIS — O23.40 UNSPECIFIED INFECTION OF URINARY TRACT IN PREGNANCY, UNSPECIFIED TRIMESTER: ICD-10-CM

## 2019-12-26 DIAGNOSIS — O21.9 VOMITING OF PREGNANCY, UNSPECIFIED: ICD-10-CM

## 2019-12-26 DIAGNOSIS — Z34.80 ENCOUNTER FOR SUPERVISION OF OTHER NORMAL PREGNANCY, UNSPECIFIED TRIMESTER: ICD-10-CM

## 2019-12-26 DIAGNOSIS — Z90.49 ACQUIRED ABSENCE OF OTHER SPECIFIED PARTS OF DIGESTIVE TRACT: Chronic | ICD-10-CM

## 2019-12-26 PROCEDURE — 81003 URINALYSIS AUTO W/O SCOPE: CPT

## 2019-12-26 PROCEDURE — G0463: CPT

## 2019-12-26 PROCEDURE — 99213 OFFICE O/P EST LOW 20 MIN: CPT | Mod: NC

## 2019-12-26 NOTE — PROCEDURE
[Intrauterine Pregnancy] : intrauterine pregnancy [Fetal Heart] : fetal heart present [Yolk Sac] : yolk sac present [Date: ___] : EDC: [unfilled] [CRL: ___ (mm)] : CRL - [unfilled]Umm [WNL] : Transvaginal OB Sonogram WNL

## 2019-12-27 ENCOUNTER — APPOINTMENT (OUTPATIENT)
Dept: ULTRASOUND IMAGING | Facility: IMAGING CENTER | Age: 29
End: 2019-12-27
Payer: COMMERCIAL

## 2019-12-27 ENCOUNTER — OUTPATIENT (OUTPATIENT)
Dept: OUTPATIENT SERVICES | Facility: HOSPITAL | Age: 29
LOS: 1 days | End: 2019-12-27

## 2019-12-27 DIAGNOSIS — N76.0 ACUTE VAGINITIS: ICD-10-CM

## 2019-12-27 DIAGNOSIS — Z34.90 ENCOUNTER FOR SUPERVISION OF NORMAL PREGNANCY, UNSPECIFIED, UNSPECIFIED TRIMESTER: ICD-10-CM

## 2019-12-27 DIAGNOSIS — Z90.49 ACQUIRED ABSENCE OF OTHER SPECIFIED PARTS OF DIGESTIVE TRACT: Chronic | ICD-10-CM

## 2019-12-27 PROCEDURE — 84702 CHORIONIC GONADOTROPIN TEST: CPT

## 2019-12-27 PROCEDURE — 76817 TRANSVAGINAL US OBSTETRIC: CPT

## 2019-12-27 PROCEDURE — 85730 THROMBOPLASTIN TIME PARTIAL: CPT

## 2019-12-27 PROCEDURE — 86850 RBC ANTIBODY SCREEN: CPT

## 2019-12-27 PROCEDURE — 81001 URINALYSIS AUTO W/SCOPE: CPT

## 2019-12-27 PROCEDURE — 80053 COMPREHEN METABOLIC PANEL: CPT

## 2019-12-27 PROCEDURE — 85027 COMPLETE CBC AUTOMATED: CPT

## 2019-12-27 PROCEDURE — 84295 ASSAY OF SERUM SODIUM: CPT

## 2019-12-27 PROCEDURE — 82330 ASSAY OF CALCIUM: CPT

## 2019-12-27 PROCEDURE — 85610 PROTHROMBIN TIME: CPT

## 2019-12-27 PROCEDURE — 85014 HEMATOCRIT: CPT

## 2019-12-27 PROCEDURE — 82803 BLOOD GASES ANY COMBINATION: CPT

## 2019-12-27 PROCEDURE — 87086 URINE CULTURE/COLONY COUNT: CPT

## 2019-12-27 PROCEDURE — 82947 ASSAY GLUCOSE BLOOD QUANT: CPT

## 2019-12-27 PROCEDURE — 83735 ASSAY OF MAGNESIUM: CPT

## 2019-12-27 PROCEDURE — 83605 ASSAY OF LACTIC ACID: CPT

## 2019-12-27 PROCEDURE — 76817 TRANSVAGINAL US OBSTETRIC: CPT | Mod: 26

## 2019-12-27 PROCEDURE — 86900 BLOOD TYPING SEROLOGIC ABO: CPT

## 2019-12-27 PROCEDURE — 99284 EMERGENCY DEPT VISIT MOD MDM: CPT | Mod: 25

## 2019-12-27 PROCEDURE — 84132 ASSAY OF SERUM POTASSIUM: CPT

## 2019-12-27 PROCEDURE — 86901 BLOOD TYPING SEROLOGIC RH(D): CPT

## 2019-12-27 PROCEDURE — 82435 ASSAY OF BLOOD CHLORIDE: CPT

## 2019-12-27 PROCEDURE — 93975 VASCULAR STUDY: CPT

## 2019-12-27 PROCEDURE — 83690 ASSAY OF LIPASE: CPT

## 2019-12-27 PROCEDURE — 87186 SC STD MICRODIL/AGAR DIL: CPT

## 2020-01-17 ENCOUNTER — LABORATORY RESULT (OUTPATIENT)
Age: 30
End: 2020-01-17

## 2020-01-17 ENCOUNTER — OTHER (OUTPATIENT)
Age: 30
End: 2020-01-17

## 2020-01-17 ENCOUNTER — APPOINTMENT (OUTPATIENT)
Dept: OBGYN | Facility: CLINIC | Age: 30
End: 2020-01-17
Payer: MEDICAID

## 2020-01-17 ENCOUNTER — OUTPATIENT (OUTPATIENT)
Dept: OUTPATIENT SERVICES | Facility: HOSPITAL | Age: 30
LOS: 1 days | End: 2020-01-17
Payer: MEDICAID

## 2020-01-17 VITALS
SYSTOLIC BLOOD PRESSURE: 124 MMHG | WEIGHT: 204.6 LBS | HEIGHT: 62 IN | DIASTOLIC BLOOD PRESSURE: 78 MMHG | BODY MASS INDEX: 37.65 KG/M2

## 2020-01-17 DIAGNOSIS — O36.80X0 PREGNANCY WITH INCONCLUSIVE FETAL VIABILITY, NOT APPLICABLE OR UNSPECIFIED: ICD-10-CM

## 2020-01-17 DIAGNOSIS — Z31.9 ENCOUNTER FOR PROCREATIVE MANAGEMENT, UNSPECIFIED: ICD-10-CM

## 2020-01-17 DIAGNOSIS — Z34.80 ENCOUNTER FOR SUPERVISION OF OTHER NORMAL PREGNANCY, UNSPECIFIED TRIMESTER: ICD-10-CM

## 2020-01-17 DIAGNOSIS — Z87.42 PERSONAL HISTORY OF OTHER DISEASES OF THE FEMALE GENITAL TRACT: ICD-10-CM

## 2020-01-17 DIAGNOSIS — R10.2 PELVIC AND PERINEAL PAIN: ICD-10-CM

## 2020-01-17 DIAGNOSIS — Z90.49 ACQUIRED ABSENCE OF OTHER SPECIFIED PARTS OF DIGESTIVE TRACT: Chronic | ICD-10-CM

## 2020-01-17 LAB
ALBUMIN SERPL ELPH-MCNC: 4.4 G/DL — SIGNIFICANT CHANGE UP (ref 3.3–5)
ALP SERPL-CCNC: 106 U/L — SIGNIFICANT CHANGE UP (ref 40–120)
ALT FLD-CCNC: 10 U/L — SIGNIFICANT CHANGE UP (ref 10–45)
ANION GAP SERPL CALC-SCNC: 13 MMOL/L — SIGNIFICANT CHANGE UP (ref 5–17)
AST SERPL-CCNC: 16 U/L — SIGNIFICANT CHANGE UP (ref 10–40)
BILIRUB SERPL-MCNC: 0.4 MG/DL — SIGNIFICANT CHANGE UP (ref 0.2–1.2)
BUN SERPL-MCNC: 7 MG/DL — SIGNIFICANT CHANGE UP (ref 7–23)
CALCIUM SERPL-MCNC: 9.9 MG/DL — SIGNIFICANT CHANGE UP (ref 8.4–10.5)
CHLORIDE SERPL-SCNC: 104 MMOL/L — SIGNIFICANT CHANGE UP (ref 96–108)
CO2 SERPL-SCNC: 22 MMOL/L — SIGNIFICANT CHANGE UP (ref 22–31)
CREAT SERPL-MCNC: 0.52 MG/DL — SIGNIFICANT CHANGE UP (ref 0.5–1.3)
GLUCOSE SERPL-MCNC: 80 MG/DL — SIGNIFICANT CHANGE UP (ref 70–99)
POTASSIUM SERPL-MCNC: 4.3 MMOL/L — SIGNIFICANT CHANGE UP (ref 3.5–5.3)
POTASSIUM SERPL-SCNC: 4.3 MMOL/L — SIGNIFICANT CHANGE UP (ref 3.5–5.3)
PROT SERPL-MCNC: 7.6 G/DL — SIGNIFICANT CHANGE UP (ref 6–8.3)
SODIUM SERPL-SCNC: 139 MMOL/L — SIGNIFICANT CHANGE UP (ref 135–145)
TSH SERPL-MCNC: 0.09 UIU/ML — LOW (ref 0.27–4.2)

## 2020-01-17 PROCEDURE — 87389 HIV-1 AG W/HIV-1&-2 AB AG IA: CPT

## 2020-01-17 PROCEDURE — 87186 SC STD MICRODIL/AGAR DIL: CPT

## 2020-01-17 PROCEDURE — 76830 TRANSVAGINAL US NON-OB: CPT | Mod: 26,NC

## 2020-01-17 PROCEDURE — 86900 BLOOD TYPING SEROLOGIC ABO: CPT

## 2020-01-17 PROCEDURE — 99203 OFFICE O/P NEW LOW 30 MIN: CPT | Mod: NC

## 2020-01-17 PROCEDURE — 87086 URINE CULTURE/COLONY COUNT: CPT

## 2020-01-17 PROCEDURE — 83036 HEMOGLOBIN GLYCOSYLATED A1C: CPT

## 2020-01-17 PROCEDURE — 86480 TB TEST CELL IMMUN MEASURE: CPT

## 2020-01-17 PROCEDURE — 86780 TREPONEMA PALLIDUM: CPT

## 2020-01-17 PROCEDURE — 87591 N.GONORRHOEAE DNA AMP PROB: CPT

## 2020-01-17 PROCEDURE — 85027 COMPLETE CBC AUTOMATED: CPT

## 2020-01-17 PROCEDURE — 86850 RBC ANTIBODY SCREEN: CPT

## 2020-01-17 PROCEDURE — 83655 ASSAY OF LEAD: CPT

## 2020-01-17 PROCEDURE — 87340 HEPATITIS B SURFACE AG IA: CPT

## 2020-01-17 PROCEDURE — 87491 CHLMYD TRACH DNA AMP PROBE: CPT

## 2020-01-17 PROCEDURE — 97802 MEDICAL NUTRITION INDIV IN: CPT | Mod: NC

## 2020-01-17 PROCEDURE — 97802 MEDICAL NUTRITION INDIV IN: CPT

## 2020-01-17 PROCEDURE — 86765 RUBEOLA ANTIBODY: CPT

## 2020-01-17 PROCEDURE — 86762 RUBELLA ANTIBODY: CPT

## 2020-01-17 PROCEDURE — 76830 TRANSVAGINAL US NON-OB: CPT

## 2020-01-17 PROCEDURE — G0463: CPT

## 2020-01-17 PROCEDURE — 84443 ASSAY THYROID STIM HORMONE: CPT

## 2020-01-17 PROCEDURE — 80053 COMPREHEN METABOLIC PANEL: CPT

## 2020-01-18 LAB
C TRACH RRNA SPEC QL NAA+PROBE: SIGNIFICANT CHANGE UP
ESTIMATED AVERAGE GLUCOSE: 105 MG/DL — SIGNIFICANT CHANGE UP (ref 68–114)
HBA1C BLD-MCNC: 5.3 % — SIGNIFICANT CHANGE UP (ref 4–5.6)
HBV SURFACE AG SER-ACNC: SIGNIFICANT CHANGE UP
HCT VFR BLD CALC: 37.6 % — SIGNIFICANT CHANGE UP (ref 34.5–45)
HGB BLD-MCNC: 12.1 G/DL — SIGNIFICANT CHANGE UP (ref 11.5–15.5)
HIV 1+2 AB+HIV1 P24 AG SERPL QL IA: SIGNIFICANT CHANGE UP
MCHC RBC-ENTMCNC: 26.8 PG — LOW (ref 27–34)
MCHC RBC-ENTMCNC: 32.2 GM/DL — SIGNIFICANT CHANGE UP (ref 32–36)
MCV RBC AUTO: 83.2 FL — SIGNIFICANT CHANGE UP (ref 80–100)
MEV IGG SER-ACNC: >300 AU/ML — SIGNIFICANT CHANGE UP
MEV IGG+IGM SER-IMP: POSITIVE — SIGNIFICANT CHANGE UP
N GONORRHOEA RRNA SPEC QL NAA+PROBE: SIGNIFICANT CHANGE UP
PLATELET # BLD AUTO: 338 K/UL — SIGNIFICANT CHANGE UP (ref 150–400)
RBC # BLD: 4.52 M/UL — SIGNIFICANT CHANGE UP (ref 3.8–5.2)
RBC # FLD: 13.9 % — SIGNIFICANT CHANGE UP (ref 10.3–14.5)
RUBV IGG SER-ACNC: 18.8 INDEX — SIGNIFICANT CHANGE UP
RUBV IGG SER-IMP: POSITIVE — SIGNIFICANT CHANGE UP
SPECIMEN SOURCE: SIGNIFICANT CHANGE UP
T PALLIDUM AB TITR SER: NEGATIVE — SIGNIFICANT CHANGE UP
WBC # BLD: 7.26 K/UL — SIGNIFICANT CHANGE UP (ref 3.8–10.5)
WBC # FLD AUTO: 7.26 K/UL — SIGNIFICANT CHANGE UP (ref 3.8–10.5)

## 2020-01-19 LAB
-  AMIKACIN: SIGNIFICANT CHANGE UP
-  AMPICILLIN/SULBACTAM: SIGNIFICANT CHANGE UP
-  AMPICILLIN: SIGNIFICANT CHANGE UP
-  AZTREONAM: SIGNIFICANT CHANGE UP
-  CEFAZOLIN: SIGNIFICANT CHANGE UP
-  CEFEPIME: SIGNIFICANT CHANGE UP
-  CEFOXITIN: SIGNIFICANT CHANGE UP
-  CEFTRIAXONE: SIGNIFICANT CHANGE UP
-  CIPROFLOXACIN: SIGNIFICANT CHANGE UP
-  GENTAMICIN: SIGNIFICANT CHANGE UP
-  IMIPENEM: SIGNIFICANT CHANGE UP
-  LEVOFLOXACIN: SIGNIFICANT CHANGE UP
-  MEROPENEM: SIGNIFICANT CHANGE UP
-  NITROFURANTOIN: SIGNIFICANT CHANGE UP
-  PIPERACILLIN/TAZOBACTAM: SIGNIFICANT CHANGE UP
-  TIGECYCLINE: SIGNIFICANT CHANGE UP
-  TOBRAMYCIN: SIGNIFICANT CHANGE UP
-  TRIMETHOPRIM/SULFAMETHOXAZOLE: SIGNIFICANT CHANGE UP
CULTURE RESULTS: SIGNIFICANT CHANGE UP
LEAD BLD-MCNC: <1 UG/DL — SIGNIFICANT CHANGE UP (ref 0–4)
METHOD TYPE: SIGNIFICANT CHANGE UP
ORGANISM # SPEC MICROSCOPIC CNT: SIGNIFICANT CHANGE UP
ORGANISM # SPEC MICROSCOPIC CNT: SIGNIFICANT CHANGE UP
SPECIMEN SOURCE: SIGNIFICANT CHANGE UP

## 2020-01-21 LAB
BILIRUB UR QL STRIP: NORMAL
CLARITY UR: CLEAR
COLLECTION METHOD: NORMAL
GLUCOSE UR-MCNC: NORMAL
HCG UR QL: 0.2 EU/DL
HCG UR QL: NEGATIVE
HGB UR QL STRIP.AUTO: NORMAL
KETONES UR-MCNC: NORMAL
LEUKOCYTE ESTERASE UR QL STRIP: NORMAL
NITRITE UR QL STRIP: NORMAL
PH UR STRIP: 5.5
PROT UR STRIP-MCNC: NORMAL
QUALITY CONTROL: YES
SP GR UR STRIP: 1.01

## 2020-01-22 LAB
GAMMA INTERFERON BACKGROUND BLD IA-ACNC: 0.08 IU/ML — SIGNIFICANT CHANGE UP
M TB IFN-G BLD-IMP: NEGATIVE — SIGNIFICANT CHANGE UP
M TB IFN-G CD4+ BCKGRND COR BLD-ACNC: 0.04 IU/ML — SIGNIFICANT CHANGE UP
M TB IFN-G CD4+CD8+ BCKGRND COR BLD-ACNC: 0.03 IU/ML — SIGNIFICANT CHANGE UP
QUANT TB PLUS MITOGEN MINUS NIL: >10 IU/ML — SIGNIFICANT CHANGE UP

## 2020-01-27 DIAGNOSIS — Z34.90 ENCOUNTER FOR SUPERVISION OF NORMAL PREGNANCY, UNSPECIFIED, UNSPECIFIED TRIMESTER: ICD-10-CM

## 2020-01-27 DIAGNOSIS — O21.9 VOMITING OF PREGNANCY, UNSPECIFIED: ICD-10-CM

## 2020-01-28 ENCOUNTER — APPOINTMENT (OUTPATIENT)
Dept: ANTEPARTUM | Facility: CLINIC | Age: 30
End: 2020-01-28
Payer: MEDICAID

## 2020-01-28 ENCOUNTER — ASOB RESULT (OUTPATIENT)
Age: 30
End: 2020-01-28

## 2020-01-28 PROCEDURE — 76813 OB US NUCHAL MEAS 1 GEST: CPT

## 2020-01-28 PROCEDURE — 76801 OB US < 14 WKS SINGLE FETUS: CPT

## 2020-01-28 PROCEDURE — 36416 COLLJ CAPILLARY BLOOD SPEC: CPT

## 2020-02-14 ENCOUNTER — LABORATORY RESULT (OUTPATIENT)
Age: 30
End: 2020-02-14

## 2020-02-14 ENCOUNTER — APPOINTMENT (OUTPATIENT)
Dept: OBGYN | Facility: CLINIC | Age: 30
End: 2020-02-14
Payer: MEDICAID

## 2020-02-14 ENCOUNTER — OUTPATIENT (OUTPATIENT)
Dept: OUTPATIENT SERVICES | Facility: HOSPITAL | Age: 30
LOS: 1 days | End: 2020-02-14
Payer: MEDICAID

## 2020-02-14 VITALS — DIASTOLIC BLOOD PRESSURE: 78 MMHG | SYSTOLIC BLOOD PRESSURE: 120 MMHG | BODY MASS INDEX: 36.58 KG/M2 | WEIGHT: 200 LBS

## 2020-02-14 DIAGNOSIS — Z87.440 PERSONAL HISTORY OF URINARY (TRACT) INFECTIONS: ICD-10-CM

## 2020-02-14 DIAGNOSIS — Z90.49 ACQUIRED ABSENCE OF OTHER SPECIFIED PARTS OF DIGESTIVE TRACT: Chronic | ICD-10-CM

## 2020-02-14 DIAGNOSIS — O23.40 UNSPECIFIED INFECTION OF URINARY TRACT IN PREGNANCY, UNSPECIFIED TRIMESTER: ICD-10-CM

## 2020-02-14 DIAGNOSIS — E66.9 OBESITY, UNSPECIFIED: ICD-10-CM

## 2020-02-14 DIAGNOSIS — Z87.448 PERSONAL HISTORY OF OTHER DISEASES OF URINARY SYSTEM: ICD-10-CM

## 2020-02-14 DIAGNOSIS — R79.89 OTHER SPECIFIED ABNORMAL FINDINGS OF BLOOD CHEMISTRY: ICD-10-CM

## 2020-02-14 DIAGNOSIS — Z34.80 ENCOUNTER FOR SUPERVISION OF OTHER NORMAL PREGNANCY, UNSPECIFIED TRIMESTER: ICD-10-CM

## 2020-02-14 DIAGNOSIS — O21.9 VOMITING OF PREGNANCY, UNSPECIFIED: ICD-10-CM

## 2020-02-14 DIAGNOSIS — Z87.898 PERSONAL HISTORY OF OTHER SPECIFIED CONDITIONS: ICD-10-CM

## 2020-02-14 DIAGNOSIS — Z34.90 ENCOUNTER FOR SUPERVISION OF NORMAL PREGNANCY, UNSPECIFIED, UNSPECIFIED TRIMESTER: ICD-10-CM

## 2020-02-14 DIAGNOSIS — O26.899 OTHER SPECIFIED PREGNANCY RELATED CONDITIONS, UNSPECIFIED TRIMESTER: ICD-10-CM

## 2020-02-14 DIAGNOSIS — K21.9 GASTRO-ESOPHAGEAL REFLUX DISEASE WITHOUT ESOPHAGITIS: ICD-10-CM

## 2020-02-14 DIAGNOSIS — K21.9 GASTRO-ESOPHAGEAL REFLUX DISEASE W/OUT ESOPHAGITIS: ICD-10-CM

## 2020-02-14 PROCEDURE — 99213 OFFICE O/P EST LOW 20 MIN: CPT | Mod: NC

## 2020-02-15 LAB
T4 FREE SERPL-MCNC: 1.3 NG/DL — SIGNIFICANT CHANGE UP (ref 0.9–1.8)
T4 FREE+ TSH PNL SERPL: 0.05 UIU/ML — LOW (ref 0.27–4.2)

## 2020-02-25 ENCOUNTER — APPOINTMENT (OUTPATIENT)
Dept: PULMONOLOGY | Facility: CLINIC | Age: 30
End: 2020-02-25
Payer: MEDICAID

## 2020-02-25 ENCOUNTER — INPATIENT (INPATIENT)
Facility: HOSPITAL | Age: 30
LOS: 2 days | Discharge: HOME | DRG: 833 | End: 2020-02-28
Attending: INTERNAL MEDICINE | Admitting: HOSPITALIST
Payer: MEDICAID

## 2020-02-25 VITALS — WEIGHT: 200 LBS | BODY MASS INDEX: 36.8 KG/M2 | HEIGHT: 61.81 IN

## 2020-02-25 VITALS
WEIGHT: 199.96 LBS | DIASTOLIC BLOOD PRESSURE: 71 MMHG | TEMPERATURE: 99 F | RESPIRATION RATE: 18 BRPM | HEIGHT: 61 IN | OXYGEN SATURATION: 100 % | HEART RATE: 72 BPM | SYSTOLIC BLOOD PRESSURE: 104 MMHG

## 2020-02-25 VITALS
HEART RATE: 91 BPM | DIASTOLIC BLOOD PRESSURE: 74 MMHG | RESPIRATION RATE: 16 BRPM | WEIGHT: 200 LBS | BODY MASS INDEX: 36.8 KG/M2 | OXYGEN SATURATION: 99 % | TEMPERATURE: 98.7 F | SYSTOLIC BLOOD PRESSURE: 111 MMHG | HEIGHT: 61.81 IN

## 2020-02-25 DIAGNOSIS — Z90.49 ACQUIRED ABSENCE OF OTHER SPECIFIED PARTS OF DIGESTIVE TRACT: Chronic | ICD-10-CM

## 2020-02-25 LAB
ALBUMIN SERPL ELPH-MCNC: 4 G/DL — SIGNIFICANT CHANGE UP (ref 3.3–5)
ALP SERPL-CCNC: 171 U/L — HIGH (ref 40–120)
ALT FLD-CCNC: 17 U/L — SIGNIFICANT CHANGE UP (ref 10–45)
ANION GAP SERPL CALC-SCNC: 15 MMOL/L — SIGNIFICANT CHANGE UP (ref 5–17)
APTT BLD: 28.2 SEC — SIGNIFICANT CHANGE UP (ref 27.5–36.3)
AST SERPL-CCNC: 21 U/L — SIGNIFICANT CHANGE UP (ref 10–40)
BASOPHILS # BLD AUTO: 0.02 K/UL — SIGNIFICANT CHANGE UP (ref 0–0.2)
BASOPHILS NFR BLD AUTO: 0.2 % — SIGNIFICANT CHANGE UP (ref 0–2)
BILIRUB SERPL-MCNC: 0.3 MG/DL — SIGNIFICANT CHANGE UP (ref 0.2–1.2)
BUN SERPL-MCNC: 6 MG/DL — LOW (ref 7–23)
CALCIUM SERPL-MCNC: 10 MG/DL — SIGNIFICANT CHANGE UP (ref 8.4–10.5)
CHLORIDE SERPL-SCNC: 101 MMOL/L — SIGNIFICANT CHANGE UP (ref 96–108)
CO2 SERPL-SCNC: 19 MMOL/L — LOW (ref 22–31)
CREAT SERPL-MCNC: 0.47 MG/DL — LOW (ref 0.5–1.3)
EOSINOPHIL # BLD AUTO: 0.03 K/UL — SIGNIFICANT CHANGE UP (ref 0–0.5)
EOSINOPHIL NFR BLD AUTO: 0.3 % — SIGNIFICANT CHANGE UP (ref 0–6)
GLUCOSE SERPL-MCNC: 83 MG/DL — SIGNIFICANT CHANGE UP (ref 70–99)
HCT VFR BLD CALC: 35.5 % — SIGNIFICANT CHANGE UP (ref 34.5–45)
HGB BLD-MCNC: 11.5 G/DL — SIGNIFICANT CHANGE UP (ref 11.5–15.5)
IMM GRANULOCYTES NFR BLD AUTO: 0.6 % — SIGNIFICANT CHANGE UP (ref 0–1.5)
INR BLD: 1.12 RATIO — SIGNIFICANT CHANGE UP (ref 0.88–1.16)
LYMPHOCYTES # BLD AUTO: 2.11 K/UL — SIGNIFICANT CHANGE UP (ref 1–3.3)
LYMPHOCYTES # BLD AUTO: 22.1 % — SIGNIFICANT CHANGE UP (ref 13–44)
MCHC RBC-ENTMCNC: 26 PG — LOW (ref 27–34)
MCHC RBC-ENTMCNC: 32.4 GM/DL — SIGNIFICANT CHANGE UP (ref 32–36)
MCV RBC AUTO: 80.3 FL — SIGNIFICANT CHANGE UP (ref 80–100)
MONOCYTES # BLD AUTO: 0.62 K/UL — SIGNIFICANT CHANGE UP (ref 0–0.9)
MONOCYTES NFR BLD AUTO: 6.5 % — SIGNIFICANT CHANGE UP (ref 2–14)
NEUTROPHILS # BLD AUTO: 6.71 K/UL — SIGNIFICANT CHANGE UP (ref 1.8–7.4)
NEUTROPHILS NFR BLD AUTO: 70.3 % — SIGNIFICANT CHANGE UP (ref 43–77)
NRBC # BLD: 0 /100 WBCS — SIGNIFICANT CHANGE UP (ref 0–0)
NT-PROBNP SERPL-SCNC: 5 PG/ML — SIGNIFICANT CHANGE UP (ref 0–300)
PLATELET # BLD AUTO: 290 K/UL — SIGNIFICANT CHANGE UP (ref 150–400)
POTASSIUM SERPL-MCNC: 3.5 MMOL/L — SIGNIFICANT CHANGE UP (ref 3.5–5.3)
POTASSIUM SERPL-SCNC: 3.5 MMOL/L — SIGNIFICANT CHANGE UP (ref 3.5–5.3)
PROT SERPL-MCNC: 7.9 G/DL — SIGNIFICANT CHANGE UP (ref 6–8.3)
PROTHROM AB SERPL-ACNC: 12.8 SEC — SIGNIFICANT CHANGE UP (ref 10–12.9)
RBC # BLD: 4.42 M/UL — SIGNIFICANT CHANGE UP (ref 3.8–5.2)
RBC # FLD: 13.2 % — SIGNIFICANT CHANGE UP (ref 10.3–14.5)
SODIUM SERPL-SCNC: 135 MMOL/L — SIGNIFICANT CHANGE UP (ref 135–145)
TROPONIN T, HIGH SENSITIVITY RESULT: <6 NG/L — SIGNIFICANT CHANGE UP (ref 0–51)
WBC # BLD: 9.55 K/UL — SIGNIFICANT CHANGE UP (ref 3.8–10.5)
WBC # FLD AUTO: 9.55 K/UL — SIGNIFICANT CHANGE UP (ref 3.8–10.5)

## 2020-02-25 PROCEDURE — ZZZZZ: CPT

## 2020-02-25 PROCEDURE — 94726 PLETHYSMOGRAPHY LUNG VOLUMES: CPT

## 2020-02-25 PROCEDURE — 71046 X-RAY EXAM CHEST 2 VIEWS: CPT | Mod: 26

## 2020-02-25 PROCEDURE — 99285 EMERGENCY DEPT VISIT HI MDM: CPT

## 2020-02-25 PROCEDURE — 99204 OFFICE O/P NEW MOD 45 MIN: CPT | Mod: 25

## 2020-02-25 PROCEDURE — 93010 ELECTROCARDIOGRAM REPORT: CPT

## 2020-02-25 PROCEDURE — 94729 DIFFUSING CAPACITY: CPT

## 2020-02-25 PROCEDURE — 93970 EXTREMITY STUDY: CPT | Mod: 26

## 2020-02-25 PROCEDURE — 94060 EVALUATION OF WHEEZING: CPT

## 2020-02-25 RX ORDER — ACETAMINOPHEN 500 MG
975 TABLET ORAL ONCE
Refills: 0 | Status: COMPLETED | OUTPATIENT
Start: 2020-02-25 | End: 2020-02-25

## 2020-02-25 RX ADMIN — Medication 975 MILLIGRAM(S): at 23:49

## 2020-02-25 NOTE — ED PROVIDER NOTE - OBJECTIVE STATEMENT
Malissa Gama MD: 29 yoF  17 wks pregnant sent in from pulmonology office for worsening substernal CP and GIRARD x 10 wks. pt states that's she that she now get SOB with minimal activity. CP nonradiating worse with exertion and inspiration, not associated with, position, or food consumption.  Report nausea, vomiting but pt denies , weakness, fatigue, lightheadedness.  Pt has had no recent long trips, surgery, trauma, denies hemoptysis, and has no hx of DVT/PE. .  Pt also denies fevers, chills. Reports b/l calf pain

## 2020-02-25 NOTE — H&P ADULT - NSHPLABSRESULTS_GEN_ALL_CORE
I have reviewed the labs, imaging and ekg. EKG with NSR HR 69, QTc 443, TWI V2, non-specific lateral lead findings

## 2020-02-25 NOTE — PHYSICAL EXAM
[Normal Appearance] : normal appearance [No Murmurs] : no murmurs [Normal S1, S2] : normal s1, s2 [Normal Rate/Rhythm] : normal rate/rhythm [Clear to Auscultation Bilaterally] : clear to auscultation bilaterally [No Abnormalities] : no abnormalities [Benign] : benign [No Clubbing] : no clubbing [Normal Gait] : normal gait [No Focal Deficits] : no focal deficits [Normal Affect] : normal affect [Oriented x3] : oriented x3 [TextBox_2] : breathless, pregnant [TextBox_68] : +respiratory distress [TextBox_11] : crowded oropharynx

## 2020-02-25 NOTE — ED PROVIDER NOTE - PROGRESS NOTE DETAILS
received sign-out on pt. Pt TBA for echo as per request of pulmonologist. Pt without EKG findings and does not require tele. CDU full. labs, images and plan d/w pt. all questions answered. pt ready and stable for admission. -Rene Soto PA-C

## 2020-02-25 NOTE — H&P ADULT - NSHPPHYSICALEXAM_GEN_ALL_CORE
Vital Signs Last 24 Hrs  T(C): 36.9 (02-25-20 @ 23:44), Max: 37.1 (02-25-20 @ 16:00)  T(F): 98.4 (02-25-20 @ 23:44), Max: 98.7 (02-25-20 @ 16:00)  HR: 88 (02-25-20 @ 23:44) (72 - 88)  BP: 100/66 (02-25-20 @ 23:44) (100/57 - 121/75)  BP(mean): --  RR: 18 (02-25-20 @ 23:44) (16 - 20)  SpO2: 100% (02-25-20 @ 23:44) (100% - 100%)

## 2020-02-25 NOTE — ED PROVIDER NOTE - NS ED MD DISPO ISOLATION TYPES
Pt noted with rash slightly raised reddened to bilat arms, chest , shoulder areas  Pt reports itching that has returned with reddeneing and increase in rash  Pt denies any itchy throat, difficulty swallowing, breathing  Dr Dominick Alexander at bedside to evaluate         Damon King RN  01/18/19 3549
None

## 2020-02-25 NOTE — ED PROVIDER NOTE - ATTENDING CONTRIBUTION TO CARE
30 yo female @ 17 weeks with GIRARD x 2.5 months.  Seen by pulm today, found to be significantly dyspneic with ambulation and hypoxic to the 80s.  No distress @ rest, lungs clear.  D-dimer negative using pregnancy cutoff -- low suspicion for PE.  Will admit for echo, pulm consult and further management.

## 2020-02-25 NOTE — ED ADULT NURSE NOTE - NSIMPLEMENTINTERV_GEN_ALL_ED
Implemented All Universal Safety Interventions:  Winter to call system. Call bell, personal items and telephone within reach. Instruct patient to call for assistance. Room bathroom lighting operational. Non-slip footwear when patient is off stretcher. Physically safe environment: no spills, clutter or unnecessary equipment. Stretcher in lowest position, wheels locked, appropriate side rails in place.

## 2020-02-25 NOTE — DISCUSSION/SUMMARY
[FreeTextEntry1] : 29 F PMH obesity, Para 4,  3 (17 weeks pregnant) who presents with chief complaint of SOB and GIRARD here for pulm evaluation.\par \par On ambulation trial in the office, O2 sat at rest (98% with HR of 100) decreased to 82% with a good waveform, HR ~120 on ambulation. She appeared markedly dyspneic and tremulous. Concern for PE. \par \par - Sent to the ED for CTPA\par - ECHO to evaluate for RH strain\par - May also have a component of TINO (reports snoring and daytime sleepiness) - can re-adress on follow up.

## 2020-02-25 NOTE — END OF VISIT
[FreeTextEntry3] : Patient markedly dyspneic on walking with desaturation into the low 80s and tachycardia.  Called ambulance to take her to ED.  Concern for PE.   [] : Fellow

## 2020-02-25 NOTE — ED PROVIDER NOTE - NS ED ROS FT
GENERAL: No fever or chills, EYES: no change in vision, HEENT: no trouble speaking, CARDIAC: + chest pain, palpitation PULMONARY: + cough or +SOB, GI: no abdominal pain, no nausea, no vomiting, no diarrhea or constipation, : No changes in urination, SKIN: no rashes, NEURO: no headache,  MSK: No muscle pain ~Malissa Gama MD

## 2020-02-25 NOTE — HISTORY OF PRESENT ILLNESS
[Never] : never [TextBox_4] : 29 F PMH obesity, Para 4,  3 (17 weeks pregnant) who presents with chief complaint of SOB and GIRARD here for pulm evaluation.\par \par States that in her prior 3 pregnancies she never had similar symptoms. Was much thinner in prior pregnancies though and initially attributed the SOB to that. However it has become progressively worse despite having lost ~20# since becoming pregnant. Feels that she cannot walk more than 1 or 2 blocks without becoming severely winded. Also becomes shakey and tremulous if she walks quickly. No recent travel. No recent long care trips. Has chest tightness and pain and leg pain with ambulation. \par \par Ambulation trial in the office. O2 sat at rest was 98% with HR of 100, with ambulation O2 went down to 82% with a good waveform, HR ~120 and patient appeared markedly dyspneic and tremulous.

## 2020-02-25 NOTE — H&P ADULT - GENITOURINARY
----- Message from Clarisse Torres sent at 6/20/2019 11:57 AM CDT -----  Contact: patient  Please call pt at [phone]    Patient is returning nurse from this morning    Thank you  
Returned call to pt. He will do labs @ [facility] in [place], [place].    [address], [place] [address]  Phone: [phone]  Fax: [phone]  
details…

## 2020-02-25 NOTE — H&P ADULT - HISTORY OF PRESENT ILLNESS
29F 17 weeks pregnant p/w SOB, CP and hypoxia. Pt endorses having SOB over the past 9 weeks worse than her 3 prior pregnancies. She was sent for follow up with pulm by her PMD. Reportedly during pulmonology visit today she was found to be hypoxic after walking around. Cannot remember exact 02 read. She denies using nail polish or fake nails. She was then sent in for inpatient TTE. Pt endorses some midsternal pleuritic chest pain on occasion. Denies any dizziness, recent travel or history of blood clots. Endorses cough productive of green sputum over last 4 days but denies fevers or chills.     In ER: Given acetaminophen 975mg PO

## 2020-02-25 NOTE — H&P ADULT - PROBLEM SELECTOR PLAN 1
Unclear etiology at this point. EKG unremarkable. Dopplers negative. Denies hx of VTE. Reportedly some suspicion for TINO outpatient. D-dimer elevated but likely to be elevated in pregnancy. Note negative pBNP and troponin. Pt advised to come to ER for TTE.  -TTE  -Trend vital signs  -Low threshold for RVP  -Can consider V/Q scan but unclear if warranted at this point.

## 2020-02-25 NOTE — ED ADULT NURSE NOTE - OBJECTIVE STATEMENT
29 yoF  17 wks pregnant sent in from pulmonology office for worsening substernal CP and GIRARD x 10 wks. pt states that's she that she now get SOB with minimal activity. CP nonradiating worse with exertion and inspiration, not associated with, position, or food consumption.  Report nausea, vomiting but pt denies , weakness, fatigue, lightheadedness.  Pt has had no recent long trips, surgery, trauma, denies hemoptysis, and has no hx of DVT/PE. .  Pt also denies fevers, chills. Reports b/l calf pain 28 y/o female BIB EMS, 17 weeks pregnant sent in from pulmonology office for worsening chest pain and GIRARD x 10 weeks.  Pt states chest pain is non radiating,  worse with exertion and inspiration.  Report nausea, vomiting, denies diarrhea, weakness, fever, chills, fatigue, lightheadedness.  Reports bilateral calf pain.  Pt  reports no recent long trips, surgery, trauma, and no history of blood clots.  No acute respiratory distress noted.

## 2020-02-25 NOTE — REVIEW OF SYSTEMS
[SOB on Exertion] : sob on exertion [Nausea] : nausea [Vomiting] : vomiting [Negative] : Endocrine [Fever] : no fever [Chills] : no chills [Fatigue] : no fatigue [Dry Eyes] : no dry eyes [Poor Appetite] : no poor appetite [Eye Irritation] : no eye irritation [Nasal Congestion] : no nasal congestion [Postnasal Drip] : no postnasal drip [Sinus Problems] : no sinus problems [Mouth Ulcers] : no mouth ulcers [Cough] : no cough [Hemoptysis] : no hemoptysis [Sputum] : no sputum [Chest Discomfort] : no chest discomfort [Dyspnea] : no dyspnea [Edema] : no edema [Palpitations] : no palpitations [Hay Fever] : no hay fever [GERD] : no gerd [Nasal Discharge] : no nasal discharge [Abdominal Pain] : no abdominal pain [Diarrhea] : no diarrhea [Constipation] : no constipation [TextBox_151] : Pregnant

## 2020-02-26 DIAGNOSIS — R09.89 OTHER SPECIFIED SYMPTOMS AND SIGNS INVOLVING THE CIRCULATORY AND RESPIRATORY SYSTEMS: ICD-10-CM

## 2020-02-26 DIAGNOSIS — Z3A.17 17 WEEKS GESTATION OF PREGNANCY: ICD-10-CM

## 2020-02-26 DIAGNOSIS — Z29.9 ENCOUNTER FOR PROPHYLACTIC MEASURES, UNSPECIFIED: ICD-10-CM

## 2020-02-26 DIAGNOSIS — R06.09 OTHER FORMS OF DYSPNEA: ICD-10-CM

## 2020-02-26 LAB — GAS PNL BLDA: SIGNIFICANT CHANGE UP

## 2020-02-26 PROCEDURE — 99223 1ST HOSP IP/OBS HIGH 75: CPT

## 2020-02-26 PROCEDURE — 99232 SBSQ HOSP IP/OBS MODERATE 35: CPT | Mod: GC

## 2020-02-26 PROCEDURE — 12345: CPT | Mod: NC,GC

## 2020-02-26 PROCEDURE — 93306 TTE W/DOPPLER COMPLETE: CPT | Mod: 26

## 2020-02-26 RX ORDER — ACETAMINOPHEN 500 MG
650 TABLET ORAL EVERY 6 HOURS
Refills: 0 | Status: DISCONTINUED | OUTPATIENT
Start: 2020-02-26 | End: 2020-02-28

## 2020-02-26 RX ORDER — ACETAMINOPHEN 500 MG
650 TABLET ORAL ONCE
Refills: 0 | Status: COMPLETED | OUTPATIENT
Start: 2020-02-26 | End: 2020-02-26

## 2020-02-26 RX ORDER — ACETAMINOPHEN 500 MG
2 TABLET ORAL
Qty: 0 | Refills: 0 | DISCHARGE

## 2020-02-26 RX ADMIN — Medication 650 MILLIGRAM(S): at 02:20

## 2020-02-26 RX ADMIN — Medication 1 TABLET(S): at 13:51

## 2020-02-26 RX ADMIN — Medication 650 MILLIGRAM(S): at 11:43

## 2020-02-26 RX ADMIN — Medication 650 MILLIGRAM(S): at 12:43

## 2020-02-26 RX ADMIN — Medication 650 MILLIGRAM(S): at 02:50

## 2020-02-26 RX ADMIN — Medication 650 MILLIGRAM(S): at 23:49

## 2020-02-26 RX ADMIN — Medication 650 MILLIGRAM(S): at 23:19

## 2020-02-26 NOTE — PROVIDER CONTACT NOTE (OTHER) - ASSESSMENT
pt a&o4, denies chest pain. HR 80. Pt just waking up. Pt experiencing SOB, same as why she was admitted. pt states she "feels no changes everything is the same" pt shows no s/s of distress.

## 2020-02-26 NOTE — CONSULT NOTE ADULT - ASSESSMENT
Pt is a 29F currently 17 weeks pregnant with PMHx obesity, sickle cell trait, migraine headaches, and GERD presenting from pulmonary clinic 2/25/20 for dyspnea of unclear etiology.     INCOMPLETE Pt is a 29F currently 17 weeks pregnant with PMHx obesity, sickle cell trait, migraine headaches, and GERD presenting from pulmonary clinic 2/25/20 for dyspnea of unclear etiology, found to have hypoxemia on exertion. Initial diagnostic w/u unremarkable, including normal cardiac enzymes and EKG. LE Doppler (-). D-Dimer slightly elevated in the setting of pregnancy.   -TTE pending  -Please check ABG   -If nondiagnostic would perform CTA, if pt amenable  -Would treat nausea as per obstetrics service   -Pulmonary will continue to follow

## 2020-02-26 NOTE — CONSULT NOTE ADULT - PROBLEM SELECTOR RECOMMENDATION 9
- Final OB recs pending attending discussion  Oumar PGY1 - Appreciate primary team recs as above  - Please notify Ob team if pt develops abdominal cramping, vaginal bleeding or leaking of fluid  - Will continue to monitor fetus as appropriate   - No acute Ob interventions indicated at this time  dw Dr. Cabezas  Antepartum team aware  Oumar PGY1

## 2020-02-26 NOTE — CONSULT NOTE ADULT - ATTENDING COMMENTS
Patient is currently pregannt and now with dyspnea on exertion that has been worsening, found to have acute hypoxic respiratory failure in outpatient medicine and pulmonary offices on exertion, here for further workup.  Still with GIRARD when she ambulates, though she is not desaturating.  DDimer slightly elevated (though in setting of pregnancy).  If TTE is normal, would recommend CTA chest to r/o PE.

## 2020-02-26 NOTE — PROGRESS NOTE ADULT - PROBLEM SELECTOR PLAN 2
- cont. prenatal vitamin  - Consider OB consult if SOB remains idiopathic. - cont. prenatal vitamin  - Consider OB consult if SOB remains idiopathic.  - fetal U/S after imaging to check status of fetus. Obgyn said no reason to see inpatient

## 2020-02-26 NOTE — PROGRESS NOTE ADULT - SUBJECTIVE AND OBJECTIVE BOX
PROGRESS NOTE:   Authored by Shayy Hernandez MD, PGY1, Internal Medicine, pager [phone]     Patient is a 29y old  Female who presents with a chief complaint of Shortness of Breath (26 Feb 2020 12:53)      SUBJECTIVE / OVERNIGHT EVENTS: Endorses SOB, pleuritic C/P, nausea as well as h/a. Vomited around 5am. Denies diarrhea/constipation.    ADDITIONAL REVIEW OF SYSTEMS:    MEDICATIONS  (STANDING):  prenatal multivitamin 1 Tablet(s) Oral daily    MEDICATIONS  (PRN):  acetaminophen   Tablet .. 650 milliGRAM(s) Oral every 6 hours PRN Mild Pain (1 - 3), Moderate Pain (4 - 6), Severe Pain (7 - 10)      CAPILLARY BLOOD GLUCOSE        I&O's Summary      PHYSICAL EXAM:  Vital Signs Last 24 Hrs  T(C): 36.8 (26 Feb 2020 07:58), Max: 37.1 (25 Feb 2020 16:00)  T(F): 98.2 (26 Feb 2020 07:58), Max: 98.7 (25 Feb 2020 16:00)  HR: 86 (26 Feb 2020 07:58) (72 - 90)  BP: 100/64 (26 Feb 2020 07:58) (96/60 - 121/75)  BP(mean): --  RR: 18 (26 Feb 2020 07:58) (16 - 20)  SpO2: 99% (26 Feb 2020 07:58) (98% - 100%)    CONSTITUTIONAL: NAD, well-developed  RESPIRATORY: Normal respiratory effort; lungs are clear to auscultation bilaterally  CARDIOVASCULAR: Regular rate and rhythm, normal S1 and S2, no murmur/rub/gallop; No lower extremity edema  ABDOMEN: Nontender to palpation, normoactive bowel sounds  MUSCLOSKELETAL: no clubbing or cyanosis of digits  PSYCH: A+O to person, place, and time; affect appropriate    LABS:                        11.5   9.55  )-----------( 290      ( 25 Feb 2020 16:30 )             35.5     02-25    135  |  101  |  6<L>  ----------------------------<  83  3.5   |  19<L>  |  0.47<L>    Ca    10.0      25 Feb 2020 16:30    TPro  7.9  /  Alb  4.0  /  TBili  0.3  /  DBili  x   /  AST  21  /  ALT  17  /  AlkPhos  171<H>  02-25    PT/INR - ( 25 Feb 2020 16:30 )   PT: 12.8 sec;   INR: 1.12 ratio         PTT - ( 25 Feb 2020 16:30 )  PTT:28.2 sec      ABG - ( 26 Feb 2020 11:09 )  pH, Arterial: 7.44  pH, Blood: x     /  pCO2: 30    /  pO2: 108   / HCO3: 20    / Base Excess: -2.8  /  SaO2: 98          RADIOLOGY & ADDITIONAL TESTS:  Results Reviewed:   Imaging Personally Reviewed:  Electrocardiogram Personally Reviewed:    COORDINATION OF CARE:  Care Discussed with Consultants/Other Providers [Y/N]:  Prior or Outpatient Records Reviewed [Y/N]:

## 2020-02-26 NOTE — PROGRESS NOTE ADULT - ASSESSMENT
29F 17 weeks pregnant p/w SOB, CP and hypoxia in pulm clinic 29F 17 weeks pregnant p/w SOB, CP and hypoxia in pulm clinic admitted for management of GIRARD/SOB c/f PE vs cardiomyopathy.

## 2020-02-26 NOTE — PROGRESS NOTE ADULT - PROBLEM SELECTOR PLAN 1
Unclear etiology at this point. EKG unremarkable. Dopplers negative. Denies hx of VTE. Reportedly some suspicion for TINO outpatient. D-dimer elevated but likely to be elevated in pregnancy. Note negative pBNP and troponin. Pt advised to come to ER for TTE.  - f/u TTE  - Trend vital signs  - Low threshold for RVP  -Can consider V/Q scan but unclear if warranted at this point. Unclear etiology at this point. EKG unremarkable. Dopplers negative. Denies hx of VTE. Reportedly some suspicion for TINO outpatient. D-dimer elevated but likely to be elevated in pregnancy. Note negative pBNP and troponin. CXR neg  - f/u TTE  - Trend vital signs  - will consider CTA chest if c/f PE if no diagnosis after TTE  - pulm following appreciate recs ([id number])  - ABG to check for shunting/A-a gradient unremarkable

## 2020-02-26 NOTE — CONSULT NOTE ADULT - SUBJECTIVE AND OBJECTIVE BOX
CHIEF COMPLAINT: Dyspnea    HPI:  Pt is a 29F currently 17 weeks pregnant with PMHx obesity, sickle cell trait, migraine headaches, and GERD presenting from pulmonary clinic 2/25/20 for dyspnea. History obtained from patient and from pulmonary clinic team.    As per pt, at baseline she has never had any issues with breathing. She is an active person, always running around taking care of her 3 other young daughters as well as working on her feet at a [employer] all day. She found out that she was pregnant at ~7 weeks and realized that she was short of breath with exertion ~1-2 weeks afterwards. 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     Pt's 3 prior pregnancies have been uncomplicated, except for nausea. Pt also developed nausea during this hospitalization, right from the beginning of the pregnancy. She has also had daily migraine headaches for which she stopped Naproxen (because of the pregnancy) with photophobia/phonophobia which makes her nausea worse at times.  She denies reflux, but also endorses chest tightness with dyspnea. She denies wheezing, cough or mucous production, or hemoptysis. She denies syncope or lightheadedness. Pt also says that she has always had "blocked nasal passages" and depends on her mouth to breathe.     Pt was recommended by [facility] to see pulmonary team. During her new patient appointment on 2/25/20, pt underwent ambulation trial. O2 sat at rest was 98% with , with ambulation O2 went do to 82% with . Pt appeared very dyspneic and tremulous. It was recommended that she go to ER for evaluation. PFTs were performed and were normal.       PAST MEDICAL & SURGICAL HISTORY:  Urinary tract infection  PID (pelvic inflammatory disease)  S/P cholecystectomy      FAMILY HISTORY:  No pertinent family history      SOCIAL HISTORY:  Smoking: [x ] Never Smoked [ ] Former Smoker (__ packs x ___ years) [ ] Current Smoker  (__ packs x ___ years)  Substance Use: [x ] Never Used [ ] Used ____  EtOH Use: Denied  Marital Status: [ ] Single [ x]  [ ]  [ ]   Occupation: [employer]   Recent Travel: None   Advance Directives: Full Code     Allergies    No Known Allergies    Intolerances        HOME MEDICATIONS:  Home Medications:  Prenatal 1 oral capsule:  (26 Feb 2020 00:49)      REVIEW OF SYSTEMS:  Constitutional: [x ] negative [ ] fevers [ ] chills [ ] weight loss [ ] weight gain  HEENT: [x ] negative [ ] dry eyes [ ] eye irritation [ ] postnasal drip [ ] nasal congestion  CV: [ ] negative  [ x] chest pain [ ] orthopnea [ ] palpitations [ ] murmur  Resp: [ ] negative [ ] cough [ x] shortness of breath [ ] dyspnea [ ] wheezing [ ] sputum [ ] hemoptysis  GI: [ ] negative [x ] nausea [x ] vomiting [ ] diarrhea [ ] constipation [ ] abd pain [ ] dysphagia   : [x ] negative [ ] dysuria [ ] nocturia [ ] hematuria [ ] increased urinary frequency  Musculoskeletal: [x ] negative [ ] back pain [ ] myalgias [ ] arthralgias [ ] fracture  Skin: [x ] negative [ ] rash [ ] itch  Neurological: [ ] negative [ x] headache [ ] dizziness [ ] syncope [ ] weakness [ ] numbness  Psychiatric: [x ] negative [ ] anxiety [ ] depression  Endocrine: [x ] negative [ ] diabetes [ ] thyroid problem  Hematologic/Lymphatic: [ x] negative [ ] anemia [ ] bleeding problem  Allergic/Immunologic: [ x] negative [ ] itchy eyes [ ] nasal discharge [ ] hives [ ] angioedema  [x ] All other systems negative  [ ] Unable to assess ROS because ________    OBJECTIVE:  ICU Vital Signs Last 24 Hrs  T(C): 36.8 (26 Feb 2020 07:58), Max: 37.1 (25 Feb 2020 16:00)  T(F): 98.2 (26 Feb 2020 07:58), Max: 98.7 (25 Feb 2020 16:00)  HR: 86 (26 Feb 2020 07:58) (72 - 90)  BP: 100/64 (26 Feb 2020 07:58) (96/60 - 121/75)  BP(mean): --  ABP: --  ABP(mean): --  RR: 18 (26 Feb 2020 07:58) (16 - 20)  SpO2: 99% (26 Feb 2020 07:58) (98% - 100%)        CAPILLARY BLOOD GLUCOSE    PHYSICAL EXAM:  General: NAD  HEENT: NCAT, MOM, THADDEUS  Respiratory: CTA b/l, (-) wheezing, rhonchi, rales  Cardiovascular: RRR, S1/S2, (-) m/g/r  Abdomen: NT/ND  Extremities: (-) edema, clubbing, or cyanosis   Skin: c/d/i  Neurological: No focal findings  Psychiatry: Appropriate    POCUS: A-line predominant bilaterally. No PLEFs or consolidations. No pericardial effusion. RV<LV. No evidence of RV hypertrophy or overload, no D'ing of septum. Mild LVH. Grossly normal LVSF.     HOSPITAL MEDICATIONS:  Standing Meds:  prenatal multivitamin 1 Tablet(s) Oral daily      PRN Meds:  acetaminophen   Tablet .. 650 milliGRAM(s) Oral every 6 hours PRN      LABS:                        11.5   9.55  )-----------( 290      ( 25 Feb 2020 16:30 )             35.5     Hgb Trend: 11.5<--  02-25    135  |  101  |  6<L>  ----------------------------<  83  3.5   |  19<L>  |  0.47<L>    Ca    10.0      25 Feb 2020 16:30    TPro  7.9  /  Alb  4.0  /  TBili  0.3  /  DBili  x   /  AST  21  /  ALT  17  /  AlkPhos  171<H>  02-25    Creatinine Trend: 0.47<--  PT/INR - ( 25 Feb 2020 16:30 )   PT: 12.8 sec;   INR: 1.12 ratio         PTT - ( 25 Feb 2020 16:30 )  PTT:28.2 sec    Arterial Blood Gas:  02-26 @ 11:09  7.44/30/108/20/98/-2.8  ABG lactate: --        MICROBIOLOGY:       RADIOLOGY:  [x ] Reviewed and interpreted by me    PULMONARY FUNCTION TESTS: Reviewed in [employer]     EKG: CHIEF COMPLAINT: Dyspnea    HPI:  Pt is a 29F currently 17 weeks pregnant with PMHx obesity, sickle cell trait, migraine headaches, and GERD presenting from pulmonary clinic 2/25/20 for dyspnea. History obtained from patient and from pulmonary clinic team. As per pt, at baseline she has never had any issues with breathing. She is an active person, always running around taking care of her 3 other young daughters as well as working on her feet at a [employer] all day. She found out that she was pregnant at ~7 weeks and realized that she was short of breath with exertion ~1-2 weeks afterwards. 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     Pt's 3 prior pregnancies have been uncomplicated, except for nausea. Pt also developed nausea during this hospitalization, right from the beginning of the pregnancy. She has also had daily migraine headaches for which she stopped Naproxen (because of the pregnancy) with photophobia/phonophobia which makes her nausea worse at times.  She denies reflux, but also endorses chest tightness with dyspnea. She denies wheezing, cough or mucous production, or hemoptysis. She denies syncope or lightheadedness. Pt also says that she has always had "blocked nasal passages" and depends on her mouth to breathe.     Pt was recommended by [facility] to see pulmonary team. During her new patient appointment on 2/25/20, pt underwent ambulation trial. O2 sat at rest was 98% with , with ambulation O2 went do to 82% with . Pt appeared very dyspneic and tremulous. It was recommended that she go to ER for evaluation. PFTs were performed and were normal.       PAST MEDICAL & SURGICAL HISTORY:  Urinary tract infection  PID (pelvic inflammatory disease)  S/P cholecystectomy      FAMILY HISTORY:  No pertinent family history      SOCIAL HISTORY:  Smoking: [x ] Never Smoked [ ] Former Smoker (__ packs x ___ years) [ ] Current Smoker  (__ packs x ___ years)  Substance Use: [x ] Never Used [ ] Used ____  EtOH Use: Denied  Marital Status: [ ] Single [ x]  [ ]  [ ]   Occupation: [employer]   Recent Travel: None   Advance Directives: Full Code     Allergies    No Known Allergies    Intolerances        HOME MEDICATIONS:  Home Medications:  Prenatal 1 oral capsule:  (26 Feb 2020 00:49)      REVIEW OF SYSTEMS:  Constitutional: [x ] negative [ ] fevers [ ] chills [ ] weight loss [ ] weight gain  HEENT: [x ] negative [ ] dry eyes [ ] eye irritation [ ] postnasal drip [ ] nasal congestion  CV: [ ] negative  [ x] chest pain [ ] orthopnea [ ] palpitations [ ] murmur  Resp: [ ] negative [ ] cough [ x] shortness of breath [ ] dyspnea [ ] wheezing [ ] sputum [ ] hemoptysis  GI: [ ] negative [x ] nausea [x ] vomiting [ ] diarrhea [ ] constipation [ ] abd pain [ ] dysphagia   : [x ] negative [ ] dysuria [ ] nocturia [ ] hematuria [ ] increased urinary frequency  Musculoskeletal: [x ] negative [ ] back pain [ ] myalgias [ ] arthralgias [ ] fracture  Skin: [x ] negative [ ] rash [ ] itch  Neurological: [ ] negative [ x] headache [ ] dizziness [ ] syncope [ ] weakness [ ] numbness  Psychiatric: [x ] negative [ ] anxiety [ ] depression  Endocrine: [x ] negative [ ] diabetes [ ] thyroid problem  Hematologic/Lymphatic: [ x] negative [ ] anemia [ ] bleeding problem  Allergic/Immunologic: [ x] negative [ ] itchy eyes [ ] nasal discharge [ ] hives [ ] angioedema  [x ] All other systems negative  [ ] Unable to assess ROS because ________    OBJECTIVE:  ICU Vital Signs Last 24 Hrs  T(C): 36.8 (26 Feb 2020 07:58), Max: 37.1 (25 Feb 2020 16:00)  T(F): 98.2 (26 Feb 2020 07:58), Max: 98.7 (25 Feb 2020 16:00)  HR: 86 (26 Feb 2020 07:58) (72 - 90)  BP: 100/64 (26 Feb 2020 07:58) (96/60 - 121/75)  BP(mean): --  ABP: --  ABP(mean): --  RR: 18 (26 Feb 2020 07:58) (16 - 20)  SpO2: 99% (26 Feb 2020 07:58) (98% - 100%)        CAPILLARY BLOOD GLUCOSE    PHYSICAL EXAM:  General: NAD  HEENT: NCAT, MOM, THADDEUS  Respiratory: CTA b/l, (-) wheezing, rhonchi, rales  Cardiovascular: RRR, S1/S2, (-) m/g/r  Abdomen: NT/ND  Extremities: (-) edema, clubbing, or cyanosis   Skin: c/d/i  Neurological: No focal findings  Psychiatry: Appropriate    POCUS: A-line predominant bilaterally. No PLEFs or consolidations. No pericardial effusion. RV<LV. No evidence of RV hypertrophy or overload, no D'ing of septum. Mild LVH. Grossly normal LVSF.     HOSPITAL MEDICATIONS:  Standing Meds:  prenatal multivitamin 1 Tablet(s) Oral daily      PRN Meds:  acetaminophen   Tablet .. 650 milliGRAM(s) Oral every 6 hours PRN      LABS:                        11.5   9.55  )-----------( 290      ( 25 Feb 2020 16:30 )             35.5     Hgb Trend: 11.5<--  02-25    135  |  101  |  6<L>  ----------------------------<  83  3.5   |  19<L>  |  0.47<L>    Ca    10.0      25 Feb 2020 16:30    TPro  7.9  /  Alb  4.0  /  TBili  0.3  /  DBili  x   /  AST  21  /  ALT  17  /  AlkPhos  171<H>  02-25    Creatinine Trend: 0.47<--  PT/INR - ( 25 Feb 2020 16:30 )   PT: 12.8 sec;   INR: 1.12 ratio         PTT - ( 25 Feb 2020 16:30 )  PTT:28.2 sec    Arterial Blood Gas:  02-26 @ 11:09  7.44/30/108/20/98/-2.8  ABG lactate: --        MICROBIOLOGY:       RADIOLOGY:  [x ] Reviewed and interpreted by me    PULMONARY FUNCTION TESTS: Reviewed in [employer]     EKG: CHIEF COMPLAINT: Dyspnea    HPI:  Pt is a 29F currently 17 weeks pregnant with PMHx obesity, sickle cell trait, migraine headaches, and GERD presenting from pulmonary clinic 2/25/20 for dyspnea. History obtained from patient and from pulmonary clinic team. As per pt, at baseline she has never had any issues with breathing. She is an active person, always running around taking care of her 3 other young daughters as well as working on her feet at a [employer] all day. She found out that she was pregnant at ~7 weeks and realized that she was short of breath with exertion ~1-2 weeks afterwards. 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     Pt's 3 prior pregnancies have been uncomplicated, except for nausea. Pt also developed nausea during this hospitalization, right from the beginning of the pregnancy. She has also had daily migraine headaches for which she stopped Naproxen (because of the pregnancy) with photophobia/phonophobia which makes her nausea worse at times.  She denies reflux, but also endorses chest tightness with dyspnea. She denies wheezing, cough or mucous production, or hemoptysis. She denies syncope or lightheadedness. Pt also says that she has always had "blocked nasal passages" and depends on her mouth to breathe.     Pt was recommended by [facility] to see pulmonary team. During her new patient appointment on 2/25/20, pt underwent ambulation trial. O2 sat at rest was 98% with , with ambulation O2 went do to 82% with . Pt appeared very dyspneic and tremulous. It was recommended that she go to ER for evaluation. PFTs were performed and were normal.       PAST MEDICAL & SURGICAL HISTORY:  Urinary tract infection  PID (pelvic inflammatory disease)  S/P cholecystectomy      FAMILY HISTORY:  No pertinent family history in mom or dad      SOCIAL HISTORY:  Smoking: [x ] Never Smoked [ ] Former Smoker (__ packs x ___ years) [ ] Current Smoker  (__ packs x ___ years)  Substance Use: [x ] Never Used [ ] Used ____  EtOH Use: Denied  Marital Status: [ ] Single [ x]  [ ]  [ ]   Occupation: [employer]   Recent Travel: None   Advance Directives: Full Code     Allergies    No Known Allergies    Intolerances        HOME MEDICATIONS:  Home Medications:  Prenatal 1 oral capsule:  (26 Feb 2020 00:49)      REVIEW OF SYSTEMS:  Constitutional: [x ] negative [ ] fevers [ ] chills [ ] weight loss [ ] weight gain  HEENT: [x ] negative [ ] dry eyes [ ] eye irritation [ ] postnasal drip [ ] nasal congestion  CV: [ ] negative  [ x] chest pain [ ] orthopnea [ ] palpitations [ ] murmur  Resp: [ ] negative [ ] cough [ x] shortness of breath [ ] dyspnea [ ] wheezing [ ] sputum [ ] hemoptysis  GI: [ ] negative [x ] nausea [x ] vomiting [ ] diarrhea [ ] constipation [ ] abd pain [ ] dysphagia   : [x ] negative [ ] dysuria [ ] nocturia [ ] hematuria [ ] increased urinary frequency  Musculoskeletal: [x ] negative [ ] back pain [ ] myalgias [ ] arthralgias [ ] fracture  Skin: [x ] negative [ ] rash [ ] itch  Neurological: [ ] negative [ x] headache [ ] dizziness [ ] syncope [ ] weakness [ ] numbness  Psychiatric: [x ] negative [ ] anxiety [ ] depression  Endocrine: [x ] negative [ ] diabetes [ ] thyroid problem  Hematologic/Lymphatic: [ x] negative [ ] anemia [ ] bleeding problem  Allergic/Immunologic: [ x] negative [ ] itchy eyes [ ] nasal discharge [ ] hives [ ] angioedema  [x ] All other systems negative  [ ] Unable to assess ROS because ________    OBJECTIVE:  ICU Vital Signs Last 24 Hrs  T(C): 36.8 (26 Feb 2020 07:58), Max: 37.1 (25 Feb 2020 16:00)  T(F): 98.2 (26 Feb 2020 07:58), Max: 98.7 (25 Feb 2020 16:00)  HR: 86 (26 Feb 2020 07:58) (72 - 90)  BP: 100/64 (26 Feb 2020 07:58) (96/60 - 121/75)  BP(mean): --  ABP: --  ABP(mean): --  RR: 18 (26 Feb 2020 07:58) (16 - 20)  SpO2: 99% (26 Feb 2020 07:58) (98% - 100%)        CAPILLARY BLOOD GLUCOSE    PHYSICAL EXAM:  General: NAD  HEENT: NCAT, MOM, THADDEUS  Respiratory: CTA b/l, (-) wheezing, rhonchi, rales  Cardiovascular: RRR, S1/S2, (-) m/g/r  Abdomen: NT/ND  Extremities: (-) edema, clubbing, or cyanosis   Skin: c/d/i  Neurological: No focal findings  Psychiatry: Appropriate    POCUS: A-line predominant bilaterally. No PLEFs or consolidations. No pericardial effusion. RV<LV. No evidence of RV hypertrophy or overload, no D'ing of septum. Mild LVH. Grossly normal LVSF.     HOSPITAL MEDICATIONS:  Standing Meds:  prenatal multivitamin 1 Tablet(s) Oral daily      PRN Meds:  acetaminophen   Tablet .. 650 milliGRAM(s) Oral every 6 hours PRN      LABS:                        11.5   9.55  )-----------( 290      ( 25 Feb 2020 16:30 )             35.5     Hgb Trend: 11.5<--  02-25    135  |  101  |  6<L>  ----------------------------<  83  3.5   |  19<L>  |  0.47<L>    Ca    10.0      25 Feb 2020 16:30    TPro  7.9  /  Alb  4.0  /  TBili  0.3  /  DBili  x   /  AST  21  /  ALT  17  /  AlkPhos  171<H>  02-25    Creatinine Trend: 0.47<--  PT/INR - ( 25 Feb 2020 16:30 )   PT: 12.8 sec;   INR: 1.12 ratio         PTT - ( 25 Feb 2020 16:30 )  PTT:28.2 sec    Arterial Blood Gas:  02-26 @ 11:09  7.44/30/108/20/98/-2.8  ABG lactate: --        MICROBIOLOGY:       RADIOLOGY:  [x ] Reviewed and interpreted by me    PULMONARY FUNCTION TESTS: Reviewed in [employer]     EKG:

## 2020-02-26 NOTE — CONSULT NOTE ADULT - ASSESSMENT
A/P: 29y  at 16+5 with PMHx obesity, sickle cell trait, migraine headaches, and GERD p/w SOB and pleuritic chest pain, work thus far negative with negative LE doppler and echo, CTPA pending. Unclear etiology at this time for dyspnea and hypoxemia on insertion. Initial workup including normal cardiac enzymes and EKG. LE Doppler (-). D-Dimer slightly elevated in the setting of pregnancy.   Concern for PE given hypercoagulable state of pregnancy. Appreciate primary team recommendations for further workup and management of SOB/dyspnea. Pt with pre-viable pregnancy at this time. Reassuring FH at this time. Will continue to monitor fetal status as appropriate.

## 2020-02-26 NOTE — CONSULT NOTE ADULT - SUBJECTIVE AND OBJECTIVE BOX
HPI: 28y/o  at 16+5 [facility] patient p/w SOB, CP and hypoxia. Pt endorses having worsening SOB over past several weeks, worse than prior pregnancies. Sent to pulmonology by PMD, sent to ED for hypoxia with ambulation. Pt endorses some midsternal pleuritic chest pain on occasion. Denies any dizziness, recent travel or history of blood clots. Endorses cough productive of green sputum over last 4 days but denies fevers or chills.     This pregnancy has been uncomplicated thus far, first trimester screen wnl. Prior pregnancies uncomplicated. Denies GDM, HTN or PEC in prior pregnancies. FT  x3.     At this time, denies VB, discharge, LOF. Denies abdominal cramping. Not yet feeling fetal movement.     Pobhx: 3 , FT  most recent pregnancy in 2016 complicated by recurrent UTI, pyelonephritis  Pgynhx: pt denies  PMH: obesity, sickle cell trait, migraine headaches, and GERD   PSH: pt denies  allergies: NKDA  meds: pnv daily    Vital Signs Last 24 Hrs  T(C): 36.8 (2020 07:58), Max: 37.1 (2020 19:08)  T(F): 98.2 (2020 07:58), Max: 98.7 (2020 19:08)  HR: 86 (2020 07:58) (77 - 90)  BP: 100/64 (2020 07:58) (96/60 - 101/69)  BP(mean): --  RR: 18 (2020 07:58) (16 - 18)  SpO2: 99% (2020 07:58) (98% - 100%)    PE:  Gen: Comfortable, NAD  CV: RRR  Pulm: CTAB  Abd: Soft, NT, gravid  Ext: No edema or tenderness bilaterally  Spec Exam: deferred    BSS confirmed FH       LABS:                        11.5   9.55  )-----------( 290      ( 2020 16:30 )             35.5     02-25    135  |  101  |  6<L>  ----------------------------<  83  3.5   |  19<L>  |  0.47<L>    Ca    10.0      2020 16:30    TPro  7.9  /  Alb  4.0  /  TBili  0.3  /  DBili  x   /  AST  21  /  ALT  17  /  AlkPhos  171<H>  02-25    PT/INR - ( 2020 16:30 )   PT: 12.8 sec;   INR: 1.12 ratio         PTT - ( 2020 16:30 )  PTT:28.2 sec      RADIOLOGY & ADDITIONAL STUDIES:

## 2020-02-27 ENCOUNTER — APPOINTMENT (OUTPATIENT)
Dept: OBGYN | Facility: CLINIC | Age: 30
End: 2020-02-27

## 2020-02-27 LAB
ALBUMIN SERPL ELPH-MCNC: 3.7 G/DL — SIGNIFICANT CHANGE UP (ref 3.3–5)
ALP SERPL-CCNC: 151 U/L — HIGH (ref 40–120)
ALT FLD-CCNC: 14 U/L — SIGNIFICANT CHANGE UP (ref 10–45)
ANION GAP SERPL CALC-SCNC: 14 MMOL/L — SIGNIFICANT CHANGE UP (ref 5–17)
AST SERPL-CCNC: 16 U/L — SIGNIFICANT CHANGE UP (ref 10–40)
BILIRUB SERPL-MCNC: 0.2 MG/DL — SIGNIFICANT CHANGE UP (ref 0.2–1.2)
BUN SERPL-MCNC: 9 MG/DL — SIGNIFICANT CHANGE UP (ref 7–23)
CALCIUM SERPL-MCNC: 9.4 MG/DL — SIGNIFICANT CHANGE UP (ref 8.4–10.5)
CHLORIDE SERPL-SCNC: 106 MMOL/L — SIGNIFICANT CHANGE UP (ref 96–108)
CO2 SERPL-SCNC: 18 MMOL/L — LOW (ref 22–31)
CREAT SERPL-MCNC: 0.47 MG/DL — LOW (ref 0.5–1.3)
GLUCOSE SERPL-MCNC: 92 MG/DL — SIGNIFICANT CHANGE UP (ref 70–99)
HCT VFR BLD CALC: 33.3 % — LOW (ref 34.5–45)
HGB BLD-MCNC: 11.1 G/DL — LOW (ref 11.5–15.5)
MCHC RBC-ENTMCNC: 26.6 PG — LOW (ref 27–34)
MCHC RBC-ENTMCNC: 33.3 GM/DL — SIGNIFICANT CHANGE UP (ref 32–36)
MCV RBC AUTO: 79.7 FL — LOW (ref 80–100)
NRBC # BLD: 0 /100 WBCS — SIGNIFICANT CHANGE UP (ref 0–0)
PLATELET # BLD AUTO: 266 K/UL — SIGNIFICANT CHANGE UP (ref 150–400)
POTASSIUM SERPL-MCNC: 3.7 MMOL/L — SIGNIFICANT CHANGE UP (ref 3.5–5.3)
POTASSIUM SERPL-SCNC: 3.7 MMOL/L — SIGNIFICANT CHANGE UP (ref 3.5–5.3)
PROT SERPL-MCNC: 7.3 G/DL — SIGNIFICANT CHANGE UP (ref 6–8.3)
RBC # BLD: 4.18 M/UL — SIGNIFICANT CHANGE UP (ref 3.8–5.2)
RBC # FLD: 13 % — SIGNIFICANT CHANGE UP (ref 10.3–14.5)
SODIUM SERPL-SCNC: 138 MMOL/L — SIGNIFICANT CHANGE UP (ref 135–145)
WBC # BLD: 8.34 K/UL — SIGNIFICANT CHANGE UP (ref 3.8–10.5)
WBC # FLD AUTO: 8.34 K/UL — SIGNIFICANT CHANGE UP (ref 3.8–10.5)

## 2020-02-27 PROCEDURE — 71275 CT ANGIOGRAPHY CHEST: CPT | Mod: 26

## 2020-02-27 PROCEDURE — 99223 1ST HOSP IP/OBS HIGH 75: CPT | Mod: GC

## 2020-02-27 PROCEDURE — 99233 SBSQ HOSP IP/OBS HIGH 50: CPT | Mod: GC

## 2020-02-27 RX ADMIN — Medication 1 TABLET(S): at 11:41

## 2020-02-27 RX ADMIN — Medication 650 MILLIGRAM(S): at 20:09

## 2020-02-27 RX ADMIN — Medication 650 MILLIGRAM(S): at 20:39

## 2020-02-27 RX ADMIN — Medication 650 MILLIGRAM(S): at 15:37

## 2020-02-27 RX ADMIN — Medication 650 MILLIGRAM(S): at 13:55

## 2020-02-27 NOTE — PROGRESS NOTE ADULT - ASSESSMENT
Pt is a 29F currently 17 weeks pregnant with PMHx obesity, sickle cell trait, migraine headaches, and GERD presenting from pulmonary clinic 2/25/20 for dyspnea of unclear etiology, found to have hypoxemia on exertion. Initial diagnostic w/u unremarkable, including normal cardiac enzymes and EKG. LE Doppler (-). D-Dimer slightly elevated in the setting of pregnancy. TTE unremarkable. A-a gradient 4.     INCOMPLETE Pt is a 29F currently 17 weeks pregnant with PMHx obesity, sickle cell trait, migraine headaches, and GERD presenting from pulmonary clinic 2/25/20 for dyspnea of unclear etiology, found to have hypoxemia on exertion. Initial diagnostic w/u unremarkable, including normal cardiac enzymes, pro-BNP, and EKG. LE Doppler (-). D-Dimer slightly elevated in the setting of pregnancy. TTE unremarkable. PFTs and CXR normal. A-a gradient 4 without hypercapnia. Biggest  concern at this time remains thromboembolism, especially given associated hypoxemia on exertion.     -Plan for CTA this morning  -Pulmonary will continue to follow

## 2020-02-27 NOTE — MEDICAL STUDENT PROGRESS NOTE(EDUCATION) - NS MD HP STUD ASPLAN PLAN FT
1. Shortness of breath  -Patient is currently satting well at 98% on room air.   -TTE results show no significant findings.   -CXR shows clear lungs and patient is euvolemic on exam.   -BNP is 5 and trop <6. SOB less likely cardiac in etiology.   -Tele monitoring shows her to be in NSR at 70s. Continue to monitor on tele.     Formal consult note and recommendations pending Cardiology fellow and attending evaluation.

## 2020-02-27 NOTE — PROGRESS NOTE ADULT - PROBLEM SELECTOR PLAN 1
Unclear etiology at this point. EKG unremarkable. Dopplers negative. Denies hx of VTE. Reportedly some suspicion for TINO outpatient. D-dimer elevated but likely to be elevated in pregnancy. Note negative pBNP and troponin. CXR neg  - TTE no evidence of avalvular dysfunction or cardiomyopathy  -CTA ordered and on schedule for today to r/o PE, risks and benefits discussed with patient   - pulm following appreciate recs ([id number])  - No A-a graident  -Could this be an arrythmia, like SVT provoked when ambulating, will put patient on tele

## 2020-02-27 NOTE — MEDICAL STUDENT PROGRESS NOTE(EDUCATION) - NS MD HP STUD SUPERVISOR COMMENTS FT
Discussed with cardiology student and fellow; patient seen and examined. Hx., exam and labs as above.  I agree with the assessment and recommendations.   Please see fellow's note also.

## 2020-02-27 NOTE — PROGRESS NOTE ADULT - SUBJECTIVE AND OBJECTIVE BOX
CHIEF COMPLAINT: Dyspnea    Interval Events: Pt seen and examined at bedside today.     REVIEW OF SYSTEMS:  Constitutional: [x ] negative [ ] fevers [ ] chills [ ] weight loss [ ] weight gain  HEENT: [x ] negative [ ] dry eyes [ ] eye irritation [ ] postnasal drip [ ] nasal congestion  CV: [ ] negative  [ x] chest pain [ ] orthopnea [ ] palpitations [ ] murmur  Resp: [ ] negative [ ] cough [ x] shortness of breath [ ] dyspnea [ ] wheezing [ ] sputum [ ] hemoptysis  GI: [ ] negative [x ] nausea [x ] vomiting [ ] diarrhea [ ] constipation [ ] abd pain [ ] dysphagia   : [x ] negative [ ] dysuria [ ] nocturia [ ] hematuria [ ] increased urinary frequency  Musculoskeletal: [x ] negative [ ] back pain [ ] myalgias [ ] arthralgias [ ] fracture  Skin: [x ] negative [ ] rash [ ] itch  Neurological: [ ] negative [ x] headache [ ] dizziness [ ] syncope [ ] weakness [ ] numbness  Psychiatric: [x ] negative [ ] anxiety [ ] depression  Endocrine: [x ] negative [ ] diabetes [ ] thyroid problem  Hematologic/Lymphatic: [ x] negative [ ] anemia [ ] bleeding problem  Allergic/Immunologic: [ x] negative [ ] itchy eyes [ ] nasal discharge [ ] hives [ ] angioedema  [x ] All other systems negative  [ ] Unable to assess ROS because ________    OBJECTIVE:  ICU Vital Signs Last 24 Hrs  T(C): 36.9 (27 Feb 2020 04:50), Max: 36.9 (27 Feb 2020 04:50)  T(F): 98.5 (27 Feb 2020 04:50), Max: 98.5 (27 Feb 2020 04:50)  HR: 78 (27 Feb 2020 04:50) (78 - 85)  BP: 125/76 (27 Feb 2020 04:50) (103/68 - 125/76)  BP(mean): --  ABP: --  ABP(mean): --  RR: 18 (27 Feb 2020 04:50) (18 - 18)  SpO2: 98% (27 Feb 2020 04:50) (98% - 99%)        02-26 @ 07:01  -  02-27 @ 07:00  --------------------------------------------------------  IN: 240 mL / OUT: 0 mL / NET: 240 mL      CAPILLARY BLOOD GLUCOSE    PHYSICAL EXAM:  General: NAD  HEENT: NCAT, MOM, THADDEUS  Respiratory: CTA b/l, (-) wheezing, rhonchi, rales  Cardiovascular: RRR, S1/S2, (-) m/g/r  Abdomen: NT/ND  Extremities: (-) edema, clubbing, or cyanosis   Skin: c/d/i  Neurological: No focal findings  Psychiatry: Appropriate    HOSPITAL MEDICATIONS:  MEDICATIONS  (STANDING):  prenatal multivitamin 1 Tablet(s) Oral daily    MEDICATIONS  (PRN):  acetaminophen   Tablet .. 650 milliGRAM(s) Oral every 6 hours PRN Mild Pain (1 - 3), Moderate Pain (4 - 6), Severe Pain (7 - 10)      LABS:                        11.1   8.34  )-----------( 266      ( 27 Feb 2020 06:25 )             33.3     Hgb Trend: 11.1<--, 11.5<--  02-27    138  |  106  |  9   ----------------------------<  92  3.7   |  18<L>  |  0.47<L>    Ca    9.4      27 Feb 2020 06:25    TPro  7.3  /  Alb  3.7  /  TBili  0.2  /  DBili  x   /  AST  16  /  ALT  14  /  AlkPhos  151<H>  02-27    Creatinine Trend: 0.47<--, 0.47<--  PT/INR - ( 25 Feb 2020 16:30 )   PT: 12.8 sec;   INR: 1.12 ratio         PTT - ( 25 Feb 2020 16:30 )  PTT:28.2 sec    Arterial Blood Gas:  02-26 @ 11:09  7.44/30/108/20/98/-2.8  ABG lactate: --        MICROBIOLOGY:       RADIOLOGY:  [ ] Reviewed and interpreted by me    PULMONARY FUNCTION TESTS:    EKG: CHIEF COMPLAINT: Dyspnea    Interval Events: Pt seen and examined at bedside today. Was very dyspneic with minimal movement from bed to chair, no clinical change today. Is agreeable to CTA test.     REVIEW OF SYSTEMS:  Constitutional: [x ] negative [ ] fevers [ ] chills [ ] weight loss [ ] weight gain  HEENT: [x ] negative [ ] dry eyes [ ] eye irritation [ ] postnasal drip [ ] nasal congestion  CV: [ ] negative  [ x] chest pain [ ] orthopnea [ ] palpitations [ ] murmur  Resp: [ ] negative [ ] cough [ x] shortness of breath [ ] dyspnea [ ] wheezing [ ] sputum [ ] hemoptysis  GI: [ ] negative [x ] nausea [x ] vomiting [ ] diarrhea [ ] constipation [ ] abd pain [ ] dysphagia   : [x ] negative [ ] dysuria [ ] nocturia [ ] hematuria [ ] increased urinary frequency  Musculoskeletal: [x ] negative [ ] back pain [ ] myalgias [ ] arthralgias [ ] fracture  Skin: [x ] negative [ ] rash [ ] itch  Neurological: [ ] negative [ x] headache [ ] dizziness [ ] syncope [ ] weakness [ ] numbness  Psychiatric: [x ] negative [ ] anxiety [ ] depression  Endocrine: [x ] negative [ ] diabetes [ ] thyroid problem  Hematologic/Lymphatic: [ x] negative [ ] anemia [ ] bleeding problem  Allergic/Immunologic: [ x] negative [ ] itchy eyes [ ] nasal discharge [ ] hives [ ] angioedema  [x ] All other systems negative  [ ] Unable to assess ROS because ________    OBJECTIVE:  ICU Vital Signs Last 24 Hrs  T(C): 36.9 (27 Feb 2020 04:50), Max: 36.9 (27 Feb 2020 04:50)  T(F): 98.5 (27 Feb 2020 04:50), Max: 98.5 (27 Feb 2020 04:50)  HR: 78 (27 Feb 2020 04:50) (78 - 85)  BP: 125/76 (27 Feb 2020 04:50) (103/68 - 125/76)  BP(mean): --  ABP: --  ABP(mean): --  RR: 18 (27 Feb 2020 04:50) (18 - 18)  SpO2: 98% (27 Feb 2020 04:50) (98% - 99%)        02-26 @ 07:01  -  02-27 @ 07:00  --------------------------------------------------------  IN: 240 mL / OUT: 0 mL / NET: 240 mL      CAPILLARY BLOOD GLUCOSE    PHYSICAL EXAM:  General: NAD  HEENT: NCAT, MOM, THADDEUS  Respiratory: CTA b/l, (-) wheezing, rhonchi, rales  Cardiovascular: RRR, S1/S2, (-) m/g/r  Abdomen: NT/ND  Extremities: (-) edema, clubbing, or cyanosis   Skin: c/d/i  Neurological: No focal findings  Psychiatry: Appropriate    HOSPITAL MEDICATIONS:  MEDICATIONS  (STANDING):  prenatal multivitamin 1 Tablet(s) Oral daily    MEDICATIONS  (PRN):  acetaminophen   Tablet .. 650 milliGRAM(s) Oral every 6 hours PRN Mild Pain (1 - 3), Moderate Pain (4 - 6), Severe Pain (7 - 10)      LABS:                        11.1   8.34  )-----------( 266      ( 27 Feb 2020 06:25 )             33.3     Hgb Trend: 11.1<--, 11.5<--  02-27    138  |  106  |  9   ----------------------------<  92  3.7   |  18<L>  |  0.47<L>    Ca    9.4      27 Feb 2020 06:25    TPro  7.3  /  Alb  3.7  /  TBili  0.2  /  DBili  x   /  AST  16  /  ALT  14  /  AlkPhos  151<H>  02-27    Creatinine Trend: 0.47<--, 0.47<--  PT/INR - ( 25 Feb 2020 16:30 )   PT: 12.8 sec;   INR: 1.12 ratio         PTT - ( 25 Feb 2020 16:30 )  PTT:28.2 sec    Arterial Blood Gas:  02-26 @ 11:09  7.44/30/108/20/98/-2.8  ABG lactate: --        MICROBIOLOGY:       RADIOLOGY:  [ ] Reviewed and interpreted by me    PULMONARY FUNCTION TESTS:    EKG:

## 2020-02-27 NOTE — MEDICAL STUDENT PROGRESS NOTE(EDUCATION) - NS MD HP STUD ASPLAN ASSES FT
29 F  who is 17 weeks pregnant with a PMH of GERD and migraines presents for several weeks of worsening shortness of breath. Patient was sent in from the pulmonologist after she was found to be hypoxic while walking. She reports that she is short of breath while sitting however it worsens with exertion where she also reports non-radiation chest pressure and palpitations.

## 2020-02-27 NOTE — CONSULT NOTE ADULT - SUBJECTIVE AND OBJECTIVE BOX
Medical Student Cardiology Consult Note    Patient seen and evaluated at bedside.    Chief Complaint: shortness of breath    HPI:  29F 17 weeks pregnant p/w SOB, CP and hypoxia. Pt endorses having SOB over the past 9 weeks worse than her 3 prior pregnancies. She was sent for follow up with pulm by her PMD. Reportedly during pulmonology visit today she was found to be hypoxic after walking around. Cannot remember exact O2 read. She was then sent in for inpatient TTE. Pt endorses some midsternal pleuritic chest pain on occasion. Denies any dizziness, recent travel or history of blood clots. Endorses cough productive of green sputum over last 4 days but denies fevers or chills.     Today, patient reports feeling well. Still slightly short of breath with difficulty breathing, but she is satting well at 98% on room air. She does endorse a productive cough of green sputum, but denies any fever or chills. She denies any current chest pain, but does report chest pressure on exertion. She endorses palpitations on exertion. No prior history of heart disease or family history of heart disease. Denies fever, chills, nausea, or vomiting.       PMH:   Urinary tract infection  PID (pelvic inflammatory disease)  GERD      PSH:   S/P cholecystectomy      Medications:   acetaminophen   Tablet .. 650 milliGRAM(s) Oral every 6 hours PRN  prenatal multivitamin 1 Tablet(s) Oral daily      Allergies:  No Known Allergies      FAMILY HISTORY:  No pertinent family history      Social History:  Smoking History: denies.  Alcohol Use: denies.   Drug Use: denies.     Review of Systems:  REVIEW OF SYSTEMS:  CONSTITUTIONAL: No weakness, fevers or chills  EYES/ENT: No visual changes;  No dysphagia  NECK: No pain or stiffness  RESPIRATORY: No wheezing or hemoptysis; Patient reports shortness of breath that is worse on exertion and reports productive cough with green sputum for a few days.   CARDIOVASCULAR: No chest pain, but chest pressure and palpitations reported on exertion; No lower extremity edema  GASTROINTESTINAL: No abdominal or epigastric pain. No nausea, vomiting, or hematemesis; No diarrhea or constipation. No melena or hematochezia.  BACK: No back pain  GENITOURINARY: No dysuria, frequency or hematuria  NEUROLOGICAL: No numbness or weakness  SKIN: Patient reports itching since receiving contrast for CTA study.   All other review of systems is negative unless indicated above.    Physical Exam:  T(F): 97.9 (02-27), Max: 98.5 (02-27)  HR: 71 (02-27) (71 - 85)  BP: 108/72 (02-27) (99/66 - 125/76)  RR: 19 (02-27)  SpO2: 99% (02-27)  GENERAL: No acute distress, well-developed  HEAD:  Atraumatic, Normocephalic  ENT: EOMI, PERRLA, conjunctiva and sclera clear, No JVD, moist mucosa  CHEST/LUNG: Clear to auscultation bilaterally; No wheeze, equal breath sounds bilaterally   BACK: No spinal tenderness  HEART: Regular rate and rhythm; No murmurs, rubs, or gallops  ABDOMEN: Soft, Nontender, Nondistended; Bowel sounds present  EXTREMITIES:  No clubbing, cyanosis, or edema  PSYCH: Nl behavior, nl affect  NEUROLOGY: AAOx3, non-focal, cranial nerves intact  SKIN: Slight erythema seen on trunk.   LINES:    Cardiovascular Diagnostic Testing:    ECG: Personally reviewed  Sinus rhythm w/ nonspecific T wave abn (seen on previous EKGs)    Echo:    < from: Transthoracic Echocardiogram (02.26.20 @ 10:32) >  Conclusions:  1. Normal mitral valve. Minimal mitral regurgitation.  2. Aortic valve not well visualized; probably normal. No  aortic valve regurgitation seen.  3. Normal left ventricular systolic function. No segmental  wall motion abnormalities.  4. Normal diastolic function  5. Normal right ventricular size and function.  6. Normal pericardium with no pericardial effusion.  *** No previous Echo exam.    < end of copied text >    Interpretation of Telemetry: Normal sinus rhythm with a rate in the 70s     Imaging:    < from: Xray Chest 2 Views PA/Lat (02.25.20 @ 18:48) >    IMPRESSION:  Clear lungs.    < end of copied text >    < from: CT Angio Chest w/ IV Cont (02.27.20 @ 13:20) >    IMPRESSION:     No pulmonary embolus    < end of copied text >      Labs: Personally reviewed                        11.1   8.34  )-----------( 266      ( 27 Feb 2020 06:25 )             33.3     02-27    138  |  106  |  9   ----------------------------<  92  3.7   |  18<L>  |  0.47<L>    Ca    9.4      27 Feb 2020 06:25    TPro  7.3  /  Alb  3.7  /  TBili  0.2  /  DBili  x   /  AST  16  /  ALT  14  /  AlkPhos  151<H>  02-27    PT/INR - ( 25 Feb 2020 16:30 )   PT: 12.8 sec;   INR: 1.12 ratio         PTT - ( 25 Feb 2020 16:30 )  PTT:28.2 sec      Serum Pro-Brain Natriuretic Peptide: 5 pg/mL (02-25 @ 16:30)    D-Dimer Assay, Quantitative (02.25.20 @ 16:30)    D-Dimer Assay, Quantitative: 408: D-Dimer result less than 230 ng/mL DDU correlates with the absence  of thrombosis in a patient with a low and moderate       pre-test probability of thrombosis.  1 DDU is approximately equal to  2 ng/mL FEU (previous units). ng/mL DDU Patient seen and evaluated at bedside.    Chief Complaint: shortness of breath    HPI:  29F 17 weeks pregnant p/w SOB, CP and hypoxia. Pt reports having SOB, over the past 9 weeks worse than her 3 prior pregnancies. She was sent for follow up with pulm by her PMD. Reportedly during pulmonology visit today she was found to be hypoxic after walking around. Cannot remember exact O2 read. She was then sent in for inpatient TTE.   Pt describes midsternal pleuritic chest pain on occasion. Denies any dizziness, recent travel or history of blood clots. Endorses cough productive of green sputum over last 4 days but denies fevers or chills.   Today, still slightly short of breath with difficulty breathing, but she is satting well at 98% on room air.  She denies any current chest pain but describes palpitations on exertion. No prior history of heart disease or family history of heart disease. Denies fever, chills, nausea, or vomiting.       PMH:   Urinary tract infection  PID (pelvic inflammatory disease)  GERD      PSH:   S/P cholecystectomy      Medications:   acetaminophen   Tablet .. 650 milliGRAM(s) Oral every 6 hours PRN  prenatal multivitamin 1 Tablet(s) Oral daily      Allergies:  No Known Allergies      FAMILY HISTORY:  No pertinent family history of heart dz.      Social History:  Smoking History: denies.  Alcohol Use: denies.   Drug Use: denies      REVIEW OF SYSTEMS:  CONSTITUTIONAL: No weakness, fevers or chills  EYES/ENT: No visual changes;  No dysphagia  NECK: No pain or stiffness  RESPIRATORY: No wheezing or hemoptysis; Patient reports shortness of breath that is worse on exertion and reports productive cough with green sputum for a few days.   CARDIOVASCULAR: No chest pain, but chest pressure and palpitations reported on exertion; No lower extremity edema  GASTROINTESTINAL: No abdominal or epigastric pain. No nausea, vomiting, or hematemesis; No diarrhea or constipation. No melena or hematochezia.  BACK: No back pain  GENITOURINARY: No dysuria, frequency or hematuria  NEUROLOGICAL: No numbness or weakness  SKIN: Patient reports itching since receiving contrast for CTA study.   All other review of systems is negative unless indicated above.    Physical Exam:  T(F): 97.9 (02-27), Max: 98.5 (02-27)  HR: 71 (02-27) (71 - 85)  BP: 108/72 (02-27) (99/66 - 125/76)  RR: 19 (02-27)  SpO2: 99% (02-27)    GENERAL: No acute distress, well-developed  HEAD:  Atraumatic, Normocephalic  ENT: EOMI, PERRLA, conjunctiva and sclera clear, No JVD, moist mucosa  CHEST/LUNG: Clear to auscultation bilaterally; No wheeze, equal breath sounds bilaterally   BACK: No spinal tenderness  HEART: Regular rate and rhythm; No murmurs, rubs, or gallops  ABDOMEN: Soft, Nontender, Nondistended; Bowel sounds present  EXTREMITIES:  No clubbing, cyanosis, or edema  PSYCH: Nl behavior, nl affect  NEUROLOGY: AAOx3, non-focal, cranial nerves intact  SKIN: Slight erythema seen on trunk.         ECG:  Sinus rhythm w/ non-specific T wave abn (seen on previous EKGs)        Transthoracic Echocardiogram (02.26.20 @ 10:32) >  Conclusions:  1. Normal mitral valve. Minimal mitral regurgitation.  2. Aortic valve not well visualized; probably normal. No  aortic valve regurgitation seen.  3. Normal left ventricular systolic function. No segmental  wall motion abnormalities.  4. Normal diastolic function  5. Normal right ventricular size and function.  6. Normal pericardium with no pericardial effusion.  *** No previous Echo exam.      Interpretation of Telemetry: Normal sinus rhythm with a rate in the 70s       Xray Chest 2 Views PA/Lat (02.25.20 @ 18:48) >    IMPRESSION:  Clear lungs.        CT Angio Chest w/ IV Cont (02.27.20 @ 13:20) >    IMPRESSION:   No pulmonary embolus      Labs:                       11.1   8.34  )-----------( 266      ( 27 Feb 2020 06:25 )             33.3     02-27  138  |  106  |  9   ----------------------------<  92  3.7   |  18<L>  |  0.47<L>    Ca    9.4      27 Feb 2020 06:25    TPro  7.3  /  Alb  3.7  /  TBili  0.2  /  DBili  x   /  AST  16  /  ALT  14  /  AlkPhos  151<H>  02-27    PT/INR - ( 25 Feb 2020 16:30 )   PT: 12.8 sec;   INR: 1.12 ratio     PTT - ( 25 Feb 2020 16:30 )  PTT:28.2 sec      Serum Pro-Brain Natriuretic Peptide: 5 pg/mL (02-25 @ 16:30)    D-Dimer Assay, Quantitative (02.25.20 @ 16:30)    D-Dimer Assay, Quantitative: 408: D-Dimer result less than 230 ng/mL DDU correlates with the absence  of thrombosis in a patient with a low and moderate       pre-test probability of thrombosis.  1 DDU is approximately equal to  2 ng/mL FEU (previous units). ng/mL DDU

## 2020-02-27 NOTE — PROGRESS NOTE ADULT - SUBJECTIVE AND OBJECTIVE BOX
Patient is a 29y old  Female who presents with a chief complaint of Shortness of Breath (27 Feb 2020 08:03)      SUBJECTIVE / OVERNIGHT EVENTS: Patient declined CT chest yesterday 2x. OBGYN and primary team at bedside and we went over the risks and benefits and she is now agreeable. Personally contacted CT and she is on the schedule for 1 PM today. I walked with the patient around the unit, within 15-20 steps she felt symptomatic with GIRARD, SOB. She felt like "I can't breath, it's getting harder". During that time, her O2 saturation was 92-94%, never went below 92%. HR went elevated to 90s at that time. She is tearful because she is scared, but she understands why we need the test at this time. ABG revealed no A-a gradient. TTE no evidence of cardiomyopathy.   On tele:    MEDICATIONS  (STANDING):  prenatal multivitamin 1 Tablet(s) Oral daily    MEDICATIONS  (PRN):  acetaminophen   Tablet .. 650 milliGRAM(s) Oral every 6 hours PRN Mild Pain (1 - 3), Moderate Pain (4 - 6), Severe Pain (7 - 10)      T(C): 36.9 (02-27-20 @ 04:50), Max: 36.9 (02-27-20 @ 04:50)  HR: 78 (02-27-20 @ 04:50) (78 - 85)  BP: 125/76 (02-27-20 @ 04:50) (103/68 - 125/76)  RR: 18 (02-27-20 @ 04:50) (18 - 18)  SpO2: 98% (02-27-20 @ 04:50) (98% - 99%)    PHYSICAL EXAM  GENERAL: NAD, well-developed  NEURO: AO x3, PERRLA, EOMI, motor strength in tact in 4/4 extremities, sensation in tact  HEAD:  Atraumatic, Normocephalic  EYES: conjunctiva and sclera clear  NECK: Supple, No JVD, no lymphadenopathy, no thyromegaly  CHEST/LUNG: Clear to auscultation bilaterally; No wheezes, rales or rhonchi  HEART: Regular rate and rhythm; No murmurs, rubs, or gallops  ABDOMEN: Soft, Nontender, Nondistended; Bowel sounds present, no masses.  EXTREMITIES:  2+ Peripheral Pulses, No clubbing, cyanosis, or edema  SKIN: Warm, dry, in tact, no rashes or lesions  PSYCH: affect appropriate    LABS:                        11.1   8.34  )-----------( 266      ( 27 Feb 2020 06:25 )             33.3     02-27    138  |  106  |  9   ----------------------------<  92  3.7   |  18<L>  |  0.47<L>    Ca    9.4      27 Feb 2020 06:25    TPro  7.3  /  Alb  3.7  /  TBili  0.2  /  DBili  x   /  AST  16  /  ALT  14  /  AlkPhos  151<H>  02-27    PT/INR - ( 25 Feb 2020 16:30 )   PT: 12.8 sec;   INR: 1.12 ratio         PTT - ( 25 Feb 2020 16:30 )  PTT:28.2 sec        I&O's Summary    26 Feb 2020 07:01  -  27 Feb 2020 07:00  --------------------------------------------------------  IN: 240 mL / OUT: 0 mL / NET: 240 mL        Shawna Cuello MD  Internal Medicine Resident, PGY3  Pager: [phone] / [phone]

## 2020-02-27 NOTE — PROGRESS NOTE ADULT - ASSESSMENT
29F 17 weeks pregnant p/w SOB, CP and hypoxia in pulm clinic admitted for management of GIRARD/SOB, differential includes peripartum cardiomyopathy vs PE vs an arrythmia

## 2020-02-27 NOTE — MEDICAL STUDENT PROGRESS NOTE(EDUCATION) - SUBJECTIVE AND OBJECTIVE BOX
Medical Student Cardiology Consult Note    Patient seen and evaluated at bedside.    Chief Complaint: shortness of breath    HPI:  29F 17 weeks pregnant p/w SOB, CP and hypoxia. Pt endorses having SOB over the past 9 weeks worse than her 3 prior pregnancies. She was sent for follow up with pulm by her PMD. Reportedly during pulmonology visit today she was found to be hypoxic after walking around. Cannot remember exact 02 read. She denies using nail polish or fake nails. She was then sent in for inpatient TTE. Pt endorses some midsternal pleuritic chest pain on occasion. Denies any dizziness, recent travel or history of blood clots. Endorses cough productive of green sputum over last 4 days but denies fevers or chills.     In ER: Given acetaminophen 975mg PO (25 Feb 2020 23:50)    Today, patient reports feeling well. Still slightly short of breath with difficulty breathing, but she is satting well at 98% on room air. She does endorse a productive cough of green sputum, but denies any fever or chills. She denies any current chest pain, but does report chest pressure on exertion. She endorses palpitations on exertion. No prior history of heart disease or family history of heart disease. Denies fever, chills, nausea, or vomiting.       PMH:   Urinary tract infection  PID (pelvic inflammatory disease)  GERD      PSH:   S/P cholecystectomy      Medications:   acetaminophen   Tablet .. 650 milliGRAM(s) Oral every 6 hours PRN  prenatal multivitamin 1 Tablet(s) Oral daily      Allergies:  No Known Allergies      FAMILY HISTORY:  No pertinent family history      Social History:  Smoking History: denies.  Alcohol Use: denies.   Drug Use: denies.     Review of Systems:  REVIEW OF SYSTEMS:  CONSTITUTIONAL: No weakness, fevers or chills  EYES/ENT: No visual changes;  No dysphagia  NECK: No pain or stiffness  RESPIRATORY: No wheezing or hemoptysis; Patient reports shortness of breath that is worse on exertion and reports productive cough with green sputum for a few days.   CARDIOVASCULAR: No chest pain, but chest pressure and palpitations reported on exertion; No lower extremity edema  GASTROINTESTINAL: No abdominal or epigastric pain. No nausea, vomiting, or hematemesis; No diarrhea or constipation. No melena or hematochezia.  BACK: No back pain  GENITOURINARY: No dysuria, frequency or hematuria  NEUROLOGICAL: No numbness or weakness  SKIN: Patient reports itching since receiving contrast for CTA study.   All other review of systems is negative unless indicated above.    Physical Exam:  T(F): 97.9 (02-27), Max: 98.5 (02-27)  HR: 71 (02-27) (71 - 85)  BP: 108/72 (02-27) (99/66 - 125/76)  RR: 19 (02-27)  SpO2: 99% (02-27)  GENERAL: No acute distress, well-developed  HEAD:  Atraumatic, Normocephalic  ENT: EOMI, PERRLA, conjunctiva and sclera clear, No JVD, moist mucosa  CHEST/LUNG: Clear to auscultation bilaterally; No wheeze, equal breath sounds bilaterally   BACK: No spinal tenderness  HEART: Regular rate and rhythm; No murmurs, rubs, or gallops  ABDOMEN: Soft, Nontender, Nondistended; Bowel sounds present  EXTREMITIES:  No clubbing, cyanosis, or edema  PSYCH: Nl behavior, nl affect  NEUROLOGY: AAOx3, non-focal, cranial nerves intact  SKIN: Slight erythema seen on trunk.   LINES:    Cardiovascular Diagnostic Testing:    ECG: Personally reviewed    Echo:    < from: Transthoracic Echocardiogram (02.26.20 @ 10:32) >  Conclusions:  1. Normal mitral valve. Minimal mitral regurgitation.  2. Aortic valve not well visualized; probably normal. No  aortic valve regurgitation seen.  3. Normal left ventricular systolic function. No segmental  wall motion abnormalities.  4. Normal diastolic function  5. Normal right ventricular size and function.  6. Normal pericardium with no pericardial effusion.  *** No previous Echo exam.    < end of copied text >      Stress Testing:    Cath:    Interpretation of Telemetry:    Normal sinus rhythm with a rate in the 70s     Imaging:    < from: Xray Chest 2 Views PA/Lat (02.25.20 @ 18:48) >    IMPRESSION:  Clear lungs.    < end of copied text >    < from: CT Angio Chest w/ IV Cont (02.27.20 @ 13:20) >    IMPRESSION:     No pulmonary embolus    < end of copied text >      Labs: Personally reviewed                        11.1   8.34  )-----------( 266      ( 27 Feb 2020 06:25 )             33.3     02-27    138  |  106  |  9   ----------------------------<  92  3.7   |  18<L>  |  0.47<L>    Ca    9.4      27 Feb 2020 06:25    TPro  7.3  /  Alb  3.7  /  TBili  0.2  /  DBili  x   /  AST  16  /  ALT  14  /  AlkPhos  151<H>  02-27    PT/INR - ( 25 Feb 2020 16:30 )   PT: 12.8 sec;   INR: 1.12 ratio         PTT - ( 25 Feb 2020 16:30 )  PTT:28.2 sec      Serum Pro-Brain Natriuretic Peptide: 5 pg/mL (02-25 @ 16:30)    D-Dimer Assay, Quantitative (02.25.20 @ 16:30)    D-Dimer Assay, Quantitative: 408: D-Dimer result less than 230 ng/mL DDU correlates with the absence  of thrombosis in a patient with a low and moderate       pre-test probability of thrombosis.  1 DDU is approximately equal to  2 ng/mL FEU (previous units). ng/mL DDU      Culture - Urine (01.18.20 @ 01:31)    -  Trimethoprim/Sulfamethoxazole: S <=2/38    -  Piperacillin/Tazobactam: S <=16    -  Nitrofurantoin: S <=32 Should not be used to treat pyelonephritis    -  Tigecycline: S <=2    -  Tobramycin: S <=4    -  Ampicillin: S <=8 These ampicillin results predict results for amoxicillin    -  Ampicillin/Sulbactam: S <=8/4 Enterobacter, Citrobacter, and Serratia may develop resistance during prolonged therapy (3-4 days)    -  Amikacin: S <=16    -  Cefazolin: S <=8 (MIC_CL_COM_ENTERIC_CEFAZU) For uncomplicated UTI with K. pneumoniae, E. coli, or P. mirablis: ANNA <=16 is sensitive and ANNA >=32 is resistant. This also predicts results for oral agents cefaclor, cefdinir, cefpodoxime, cefprozil, cefuroxime axetil, cephalexin and locarbef for uncomplicated UTI. Note that some isolates may be susceptible to these agents while testing resistant to cefazolin.    -  Cefepime: S <=4    -  Aztreonam: S <=4    -  Cefoxitin: S <=8    -  Ciprofloxacin: S <=1    -  Ceftriaxone: S <=1 Enterobacter, Citrobacter, and Serratia may develop resistance during prolonged therapy    -  Imipenem: S <=1    -  Gentamicin: S <=4    -  Levofloxacin: S <=2    -  Meropenem: S <=1    Specimen Source: .Urine    Culture Results:   >100,000 CFU/ml Escherichia coli    Organism Identification: Escherichia coli    Organism: Escherichia coli    Method Type: ANNA Medical Student Cardiology Consult Note    Patient seen and evaluated at bedside.    Chief Complaint: shortness of breath    HPI:  29F 17 weeks pregnant p/w SOB, CP and hypoxia. Pt endorses having SOB over the past 9 weeks worse than her 3 prior pregnancies. She was sent for follow up with pulm by her PMD. Reportedly during pulmonology visit today she was found to be hypoxic after walking around. Cannot remember exact 02 read. She denies using nail polish or fake nails. She was then sent in for inpatient TTE. Pt endorses some midsternal pleuritic chest pain on occasion. Denies any dizziness, recent travel or history of blood clots. Endorses cough productive of green sputum over last 4 days but denies fevers or chills.     In ER: Given acetaminophen 975mg PO (25 Feb 2020 23:50)    Today, patient reports feeling well. Still slightly short of breath with difficulty breathing, but she is satting well at 98% on room air. She does endorse a productive cough of green sputum, but denies any fever or chills. She denies any current chest pain, but does report chest pressure on exertion. She endorses palpitations on exertion. No prior history of heart disease or family history of heart disease. Denies fever, chills, nausea, or vomiting.       PMH:   Urinary tract infection  PID (pelvic inflammatory disease)  GERD      PSH:   S/P cholecystectomy      Medications:   acetaminophen   Tablet .. 650 milliGRAM(s) Oral every 6 hours PRN  prenatal multivitamin 1 Tablet(s) Oral daily      Allergies:  No Known Allergies      FAMILY HISTORY:  No pertinent family history      Social History:  Smoking History: denies.  Alcohol Use: denies.   Drug Use: denies.     Review of Systems:  REVIEW OF SYSTEMS:  CONSTITUTIONAL: No weakness, fevers or chills  EYES/ENT: No visual changes;  No dysphagia  NECK: No pain or stiffness  RESPIRATORY: No wheezing or hemoptysis; Patient reports shortness of breath that is worse on exertion and reports productive cough with green sputum for a few days.   CARDIOVASCULAR: No chest pain, but chest pressure and palpitations reported on exertion; No lower extremity edema  GASTROINTESTINAL: No abdominal or epigastric pain. No nausea, vomiting, or hematemesis; No diarrhea or constipation. No melena or hematochezia.  BACK: No back pain  GENITOURINARY: No dysuria, frequency or hematuria  NEUROLOGICAL: No numbness or weakness  SKIN: Patient reports itching since receiving contrast for CTA study.   All other review of systems is negative unless indicated above.    Physical Exam:  T(F): 97.9 (02-27), Max: 98.5 (02-27)  HR: 71 (02-27) (71 - 85)  BP: 108/72 (02-27) (99/66 - 125/76)  RR: 19 (02-27)  SpO2: 99% (02-27)  GENERAL: No acute distress, well-developed  HEAD:  Atraumatic, Normocephalic  ENT: EOMI, PERRLA, conjunctiva and sclera clear, No JVD, moist mucosa  CHEST/LUNG: Clear to auscultation bilaterally; No wheeze, equal breath sounds bilaterally   BACK: No spinal tenderness  HEART: Regular rate and rhythm; No murmurs, rubs, or gallops  ABDOMEN: Soft, Nontender, Nondistended; Bowel sounds present  EXTREMITIES:  No clubbing, cyanosis, or edema  PSYCH: Nl behavior, nl affect  NEUROLOGY: AAOx3, non-focal, cranial nerves intact  SKIN: Slight erythema seen on trunk.   LINES:    Cardiovascular Diagnostic Testing:    ECG: Personally reviewed    Echo:    < from: Transthoracic Echocardiogram (02.26.20 @ 10:32) >  Conclusions:  1. Normal mitral valve. Minimal mitral regurgitation.  2. Aortic valve not well visualized; probably normal. No  aortic valve regurgitation seen.  3. Normal left ventricular systolic function. No segmental  wall motion abnormalities.  4. Normal diastolic function  5. Normal right ventricular size and function.  6. Normal pericardium with no pericardial effusion.  *** No previous Echo exam.    < end of copied text >      Stress Testing:    Cath:    Interpretation of Telemetry:    Normal sinus rhythm with a rate in the 70s     Imaging:    < from: Xray Chest 2 Views PA/Lat (02.25.20 @ 18:48) >    IMPRESSION:  Clear lungs.    < end of copied text >    < from: CT Angio Chest w/ IV Cont (02.27.20 @ 13:20) >    IMPRESSION:     No pulmonary embolus    < end of copied text >      Labs: Personally reviewed                        11.1   8.34  )-----------( 266      ( 27 Feb 2020 06:25 )             33.3     02-27    138  |  106  |  9   ----------------------------<  92  3.7   |  18<L>  |  0.47<L>    Ca    9.4      27 Feb 2020 06:25    TPro  7.3  /  Alb  3.7  /  TBili  0.2  /  DBili  x   /  AST  16  /  ALT  14  /  AlkPhos  151<H>  02-27    PT/INR - ( 25 Feb 2020 16:30 )   PT: 12.8 sec;   INR: 1.12 ratio         PTT - ( 25 Feb 2020 16:30 )  PTT:28.2 sec      Serum Pro-Brain Natriuretic Peptide: 5 pg/mL (02-25 @ 16:30)    D-Dimer Assay, Quantitative (02.25.20 @ 16:30)    D-Dimer Assay, Quantitative: 408: D-Dimer result less than 230 ng/mL DDU correlates with the absence  of thrombosis in a patient with a low and moderate       pre-test probability of thrombosis.  1 DDU is approximately equal to  2 ng/mL FEU (previous units). ng/mL DDU

## 2020-02-27 NOTE — CONSULT NOTE ADULT - ASSESSMENT
29 F  who is 17 weeks pregnant with a PMHx of GERD and migraines presents for several weeks of worsening shortness of breath. Patient was sent in from the pulmonologist after she was found to be hypoxic while walking. She reports that she is short of breath while sitting however it worsens with exertion where she also reports non-radiation chest pressure and palpitations.    1. Shortness of breath  -Patient is currently satting well at 98% on room air.   -TTE results show no significant findings.   -CXR shows clear lungs and patient is euvolemic on exam.   -BNP is 5 and trop <6. SOB less likely cardiac in etiology.   -Tele monitoring shows her to be in NSR at 70s.   -Follow up pulmonary recommendations    Madi Sifuentes MD  Cardiology Fellow  All cardiology service information can be found  on [email or web address], password: [email or web address] 29 F  who is 17 weeks pregnant with a PMHx of GERD and migraines presents for several weeks of worsening shortness of breath. Patient was sent in from the pulmonologist after she was found to be hypoxic while walking. She reports that she is short of breath while sitting however it worsens with exertion where she also reports non-radiation chest pressure and palpitations.      1. Shortness of breath  -Patient is currently satting well at 98% on room air.   -TTE results show no significant findings.   -CXR shows clear lungs and patient is euvolemic on exam.   -BNP is 5 and trop <6. SOB not likely ischemic in etiology.   -Tele monitoring shows her to be in NSR at 70s.   -Follow up pulmonary recommendations    Madi Sifuentes MD  Cardiology Fellow    Tony Kate M.D.  Cardiology Attending, Consult Service  Beeper     All Cardiology service information can be found  on [email or web address], password: [email or web address]

## 2020-02-28 ENCOUNTER — TRANSCRIPTION ENCOUNTER (OUTPATIENT)
Age: 30
End: 2020-02-28

## 2020-02-28 VITALS
TEMPERATURE: 98 F | OXYGEN SATURATION: 98 % | RESPIRATION RATE: 18 BRPM | SYSTOLIC BLOOD PRESSURE: 113 MMHG | DIASTOLIC BLOOD PRESSURE: 73 MMHG | HEART RATE: 85 BPM

## 2020-02-28 PROCEDURE — 80053 COMPREHEN METABOLIC PANEL: CPT

## 2020-02-28 PROCEDURE — 31575 DIAGNOSTIC LARYNGOSCOPY: CPT

## 2020-02-28 PROCEDURE — 84484 ASSAY OF TROPONIN QUANT: CPT

## 2020-02-28 PROCEDURE — 83605 ASSAY OF LACTIC ACID: CPT

## 2020-02-28 PROCEDURE — 36600 WITHDRAWAL OF ARTERIAL BLOOD: CPT

## 2020-02-28 PROCEDURE — 93970 EXTREMITY STUDY: CPT

## 2020-02-28 PROCEDURE — 85027 COMPLETE CBC AUTOMATED: CPT

## 2020-02-28 PROCEDURE — 85014 HEMATOCRIT: CPT

## 2020-02-28 PROCEDURE — 84132 ASSAY OF SERUM POTASSIUM: CPT

## 2020-02-28 PROCEDURE — 71046 X-RAY EXAM CHEST 2 VIEWS: CPT

## 2020-02-28 PROCEDURE — 99233 SBSQ HOSP IP/OBS HIGH 50: CPT | Mod: GC

## 2020-02-28 PROCEDURE — 99222 1ST HOSP IP/OBS MODERATE 55: CPT | Mod: 25

## 2020-02-28 PROCEDURE — 99239 HOSP IP/OBS DSCHRG MGMT >30: CPT

## 2020-02-28 PROCEDURE — 84295 ASSAY OF SERUM SODIUM: CPT

## 2020-02-28 PROCEDURE — 83880 ASSAY OF NATRIURETIC PEPTIDE: CPT

## 2020-02-28 PROCEDURE — 82947 ASSAY GLUCOSE BLOOD QUANT: CPT

## 2020-02-28 PROCEDURE — 85730 THROMBOPLASTIN TIME PARTIAL: CPT

## 2020-02-28 PROCEDURE — 85379 FIBRIN DEGRADATION QUANT: CPT

## 2020-02-28 PROCEDURE — 85610 PROTHROMBIN TIME: CPT

## 2020-02-28 PROCEDURE — 82330 ASSAY OF CALCIUM: CPT

## 2020-02-28 PROCEDURE — 93005 ELECTROCARDIOGRAM TRACING: CPT

## 2020-02-28 PROCEDURE — 71275 CT ANGIOGRAPHY CHEST: CPT

## 2020-02-28 PROCEDURE — 82435 ASSAY OF BLOOD CHLORIDE: CPT

## 2020-02-28 PROCEDURE — 82803 BLOOD GASES ANY COMBINATION: CPT

## 2020-02-28 PROCEDURE — 99285 EMERGENCY DEPT VISIT HI MDM: CPT

## 2020-02-28 PROCEDURE — 93306 TTE W/DOPPLER COMPLETE: CPT

## 2020-02-28 RX ORDER — ACETAMINOPHEN 500 MG
325 TABLET ORAL ONCE
Refills: 0 | Status: COMPLETED | OUTPATIENT
Start: 2020-02-28 | End: 2020-02-28

## 2020-02-28 RX ADMIN — Medication 325 MILLIGRAM(S): at 14:00

## 2020-02-28 RX ADMIN — Medication 650 MILLIGRAM(S): at 14:00

## 2020-02-28 RX ADMIN — Medication 325 MILLIGRAM(S): at 13:00

## 2020-02-28 RX ADMIN — Medication 1 TABLET(S): at 12:53

## 2020-02-28 RX ADMIN — Medication 650 MILLIGRAM(S): at 12:53

## 2020-02-28 NOTE — PROGRESS NOTE ADULT - ASSESSMENT
Pt is a 29F currently 17 weeks pregnant with PMHx obesity, sickle cell trait, migraine headaches, and GERD presenting from pulmonary clinic 2/25/20 for dyspnea of unclear etiology, found to have hypoxemia on exertion. Initial diagnostic w/u unremarkable, including normal cardiac enzymes, pro-BNP, and EKG. LE Doppler (-). D-Dimer slightly elevated in the setting of pregnancy. TTE unremarkable. PFTs and CXR normal. A-a gradient 4 without hypercapnia. CTA (-) for thromboembolic event or parenchymal abnormality. Telemetry monitoring shows NSR without abnormal events. Can also consider VC dysfunction, which would need direct visualization during dyspneic episode.    -Cardiology recommendations appreciated  -Consider ENT evaluation for laryngoscopy during dyspneic episode    INCOMPLETE Pt is a 29F currently 17 weeks pregnant with PMHx obesity, sickle cell trait, migraine headaches, and GERD presenting from pulmonary clinic 2/25/20 for dyspnea of unclear etiology, found to have hypoxemia on exertion. Initial diagnostic w/u unremarkable, including normal cardiac enzymes, pro-BNP, and EKG. LE Doppler (-). D-Dimer slightly elevated in the setting of pregnancy. TTE unremarkable. PFTs and CXR normal. A-a gradient 4 without hypercapnia. CTA (-) for thromboembolic event or parenchymal abnormality. Telemetry monitoring shows NSR without abnormal events. Can also consider VC dysfunction, which would need direct visualization during dyspneic episode.    -Cardiology recommendations appreciated  -Consider ENT evaluation for laryngoscopy during dyspneic episode  -Following ENT recommendations, there is no pulmonary contraindications to hospital discharge   -Pt has a follow-up appt with Dr. Meera Lee for 3/4/20 at 11am at [address] [facility], [address]. Pt and primary team have been made aware of appointment date/time

## 2020-02-28 NOTE — CONSULT NOTE ADULT - ASSESSMENT
30 yo female presently 17 weeks pregnant c/o GIRARD negative work up for PE and asthma. Laryngoscopy revealed mobile VCs b/l with no concern for vocal cord dysfunction 28 yo female presently 17 weeks pregnant c/o GIRARD negative work up for PE and asthma. Laryngoscopy revealed mobile VCs b/l with no concern for vocal cord dysfunction.  There is no airway obstruction at or above the level of the larynx.

## 2020-02-28 NOTE — CONSULT NOTE ADULT - NSHPATTENDINGPLANDISCUSS_GEN_ALL_CORE
cardiology fellow; patient seen and examined.       I was physically present for the key portions of the evaluation and management (E/M) service provided.    I agree with the above history, physical, and plan which I have reviewed and edited where appropriate.
ENT PA team
fellow/primary team/patient

## 2020-02-28 NOTE — PROGRESS NOTE ADULT - SUBJECTIVE AND OBJECTIVE BOX
PROGRESS NOTE:   Authored by Shayy Hernandez MD, PGY1, Internal Medicine, pager [phone]     Patient is a 29y old  Female who presents with a chief complaint of Shortness of Breath (28 Feb 2020 07:33)      SUBJECTIVE / OVERNIGHT EVENTS: Endorse nausea/vomiting/diarrhea, pleuritic C/P and SOB. Denies constipation.  HR to 138 around 5:12AM. Was walking outside room. Unable to sleep. Roommate is 1-to-1, likely a dementia patient causing a lot of chaos.      ADDITIONAL REVIEW OF SYSTEMS:    MEDICATIONS  (STANDING):  prenatal multivitamin 1 Tablet(s) Oral daily    MEDICATIONS  (PRN):  acetaminophen   Tablet .. 650 milliGRAM(s) Oral every 6 hours PRN Mild Pain (1 - 3), Moderate Pain (4 - 6), Severe Pain (7 - 10)      CAPILLARY BLOOD GLUCOSE        I&O's Summary    27 Feb 2020 07:01  -  28 Feb 2020 07:00  --------------------------------------------------------  IN: 180 mL / OUT: 0 mL / NET: 180 mL        PHYSICAL EXAM:  Vital Signs Last 24 Hrs  T(C): 36.7 (28 Feb 2020 04:40), Max: 36.9 (27 Feb 2020 10:01)  T(F): 98.1 (28 Feb 2020 04:40), Max: 98.4 (27 Feb 2020 10:01)  HR: 72 (28 Feb 2020 04:40) (71 - 99)  BP: 102/68 (28 Feb 2020 04:40) (99/66 - 117/76)  BP(mean): --  RR: 18 (28 Feb 2020 04:40) (18 - 19)  SpO2: 98% (28 Feb 2020 04:40) (97% - 99%)    CONSTITUTIONAL: NAD, well-developed  RESPIRATORY: Normal respiratory effort; lungs are clear to auscultation bilaterally  CARDIOVASCULAR: Regular rate and rhythm, normal S1 and S2, no murmur/rub/gallop; No lower extremity edema;  ABDOMEN: Nontender to palpation, normoactive bowel sounds  MUSCLOSKELETAL: no clubbing or cyanosis of digits  PSYCH: A+O to person, place, and time; affect appropriate    LABS:                        11.1   8.34  )-----------( 266      ( 27 Feb 2020 06:25 )             33.3     02-27    138  |  106  |  9   ----------------------------<  92  3.7   |  18<L>  |  0.47<L>    Ca    9.4      27 Feb 2020 06:25    TPro  7.3  /  Alb  3.7  /  TBili  0.2  /  DBili  x   /  AST  16  /  ALT  14  /  AlkPhos  151<H>  02-27                RADIOLOGY & ADDITIONAL TESTS:  Results Reviewed:   Imaging Personally Reviewed:  Electrocardiogram Personally Reviewed:    COORDINATION OF CARE:  Care Discussed with Consultants/Other Providers [Y/N]:  Prior or Outpatient Records Reviewed [Y/N]:

## 2020-02-28 NOTE — DISCHARGE NOTE PROVIDER - NSDCCPCAREPLAN_GEN_ALL_CORE_FT
PRINCIPAL DISCHARGE DIAGNOSIS  Diagnosis: Dyspnea on exertion  Assessment and Plan of Treatment: PRINCIPAL DISCHARGE DIAGNOSIS  Diagnosis: Dyspnea on exertion  Assessment and Plan of Treatment: You were sent to the hospital from the pulmonology clinic because there PRINCIPAL DISCHARGE DIAGNOSIS  Diagnosis: Dyspnea on exertion  Assessment and Plan of Treatment: You were sent to the hospital from the pulmonology clinic because you were short of breath and there wasn't enough oxygen getting to your tissues indicated by your pulse ox. To major life threatening causes of your shortness of breath are pulmonary embolism and peripartum cardiomyopathy. We did imaging to check for a pulmonary embolism (clot in your lungs) and it was negative. We also did an ECHO (ultrasound imaging of your heart) to check for peripartum cardiomyopathy (heart disease seen around pregnancy). Your ECHO was within normal limits. We had obstetrics, pulmonology and cardiology see you while you were admitted in the hospital. We monitored your heart for any abnormal heart rhythms and we did not see any. Please follow up outpatient with pulmonology and obstetrics after discharge. PRINCIPAL DISCHARGE DIAGNOSIS  Diagnosis: Dyspnea on exertion  Assessment and Plan of Treatment: You were sent to the hospital from the pulmonology clinic because you were short of breath and your pulse oxygenation was low. When your pulse oxygenatino is low it means there is not enough oxygen getting to your tissues and organs. Two major life threatening causes of your shortness of breath are pulmonary embolism and peripartum cardiomyopathy. We did imaging to check for a pulmonary embolism (a blood clot in your lungs) and it was negative. We also did an ECHO (ultrasound imaging of your heart) to check for peripartum cardiomyopathy (heart disease seen around pregnancy). Your ECHO was within normal limits. We had obstetrics, pulmonology, ear nose and throat and cardiology see you while you were admitted in the hospital. Unfortunately we do not have a clear cause of your shortness of breath at this time. We monitored your heart for any abnormal heart rhythms and we did not see any. Please follow up outpatient with pulmonology and obstetrics after discharge. PRINCIPAL DISCHARGE DIAGNOSIS  Diagnosis: Dyspnea on exertion  Assessment and Plan of Treatment: You were sent to the hospital from the pulmonology clinic because you were short of breath and your pulse oxygenation was low. When your pulse oxygenation is low it means there is not enough oxygen getting to your tissues and organs. You were admitted to the hospital to exclude two major life threatening causes of your shortness of breath which are pulmonary embolism and peripartum cardiomyopathy. We did imaging to check for a pulmonary embolism (a blood clot in your lungs) and it was negative. We also did an ECHO (ultrasound imaging of your heart) to check for peripartum cardiomyopathy (heart disease seen around pregnancy). Your ECHO was within normal limits. We had obstetrics, pulmonology, ear nose and throat and cardiology see you while you were admitted in the hospital. We monitored your heart for any abnormal heart rhythms and we did not see any. Unfortunately we do not find a clear cause of your shortness of breath. Please follow up outpatient with pulmonology and obstetrics after discharge.

## 2020-02-28 NOTE — PROGRESS NOTE ADULT - SUBJECTIVE AND OBJECTIVE BOX
CHIEF COMPLAINT: Chest pain    Interval Events: Pt seen and examined at bedside. No acute events overnight.     REVIEW OF SYSTEMS:  Constitutional: [x ] negative [ ] fevers [ ] chills [ ] weight loss [ ] weight gain  HEENT: [x ] negative [ ] dry eyes [ ] eye irritation [ ] postnasal drip [ ] nasal congestion  CV: [ ] negative  [ x] chest pain [ ] orthopnea [ ] palpitations [ ] murmur  Resp: [ ] negative [ ] cough [ x] shortness of breath [ ] dyspnea [ ] wheezing [ ] sputum [ ] hemoptysis  GI: [ ] negative [x ] nausea [x ] vomiting [ ] diarrhea [ ] constipation [ ] abd pain [ ] dysphagia   : [x ] negative [ ] dysuria [ ] nocturia [ ] hematuria [ ] increased urinary frequency  Musculoskeletal: [x ] negative [ ] back pain [ ] myalgias [ ] arthralgias [ ] fracture  Skin: [x ] negative [ ] rash [ ] itch  Neurological: [ ] negative [ x] headache [ ] dizziness [ ] syncope [ ] weakness [ ] numbness  Psychiatric: [x ] negative [ ] anxiety [ ] depression  Endocrine: [x ] negative [ ] diabetes [ ] thyroid problem  Hematologic/Lymphatic: [ x] negative [ ] anemia [ ] bleeding problem  Allergic/Immunologic: [ x] negative [ ] itchy eyes [ ] nasal discharge [ ] hives [ ] angioedema  [x ] All other systems negative  [ ] Unable to assess ROS because ________    OBJECTIVE:  ICU Vital Signs Last 24 Hrs  T(C): 36.7 (28 Feb 2020 04:40), Max: 36.9 (27 Feb 2020 10:01)  T(F): 98.1 (28 Feb 2020 04:40), Max: 98.4 (27 Feb 2020 10:01)  HR: 72 (28 Feb 2020 04:40) (71 - 99)  BP: 102/68 (28 Feb 2020 04:40) (99/66 - 117/76)  BP(mean): --  ABP: --  ABP(mean): --  RR: 18 (28 Feb 2020 04:40) (18 - 19)  SpO2: 98% (28 Feb 2020 04:40) (97% - 99%)        02-27 @ 07:01  -  02-28 @ 07:00  --------------------------------------------------------  IN: 180 mL / OUT: 0 mL / NET: 180 mL      CAPILLARY BLOOD GLUCOSE      PHYSICAL EXAM:  General: NAD  HEENT: NCAT, MOM, THADDEUS  Respiratory: CTA b/l, (-) wheezing, rhonchi, rales  Cardiovascular: RRR, S1/S2, (-) m/g/r  Abdomen: NT/ND  Extremities: (-) edema, clubbing, or cyanosis   Skin: c/d/i  Neurological: No focal findings  Psychiatry: Appropriate      HOSPITAL MEDICATIONS:  MEDICATIONS  (STANDING):  prenatal multivitamin 1 Tablet(s) Oral daily    MEDICATIONS  (PRN):  acetaminophen   Tablet .. 650 milliGRAM(s) Oral every 6 hours PRN Mild Pain (1 - 3), Moderate Pain (4 - 6), Severe Pain (7 - 10)      LABS:                        11.1   8.34  )-----------( 266      ( 27 Feb 2020 06:25 )             33.3     Hgb Trend: 11.1<--, 11.5<--  02-27    138  |  106  |  9   ----------------------------<  92  3.7   |  18<L>  |  0.47<L>    Ca    9.4      27 Feb 2020 06:25    TPro  7.3  /  Alb  3.7  /  TBili  0.2  /  DBili  x   /  AST  16  /  ALT  14  /  AlkPhos  151<H>  02-27    Creatinine Trend: 0.47<--, 0.47<--      Arterial Blood Gas:  02-26 @ 11:09  7.44/30/108/20/98/-2.8  ABG lactate: --        MICROBIOLOGY:       RADIOLOGY:  [x ] Reviewed and interpreted by me    PULMONARY FUNCTION TESTS: Reviewed in [employer]

## 2020-02-28 NOTE — DISCHARGE NOTE PROVIDER - CARE PROVIDER_API CALL
Meera Lee)  Critical Care Medicine; Pulmonary Disease  [address] [place] [address]  Phone: [phone]  Fax: [phone]  Scheduled Appointment: 03/04/2020 11:00 PM Meera Lee)  Critical Care Medicine; Pulmonary Disease  [address] [place] [address]  Phone: [phone]  Fax: [phone]  Scheduled Appointment: 03/04/2020 11:00 AM

## 2020-02-28 NOTE — DISCHARGE NOTE NURSING/CASE MANAGEMENT/SOCIAL WORK - PATIENT PORTAL LINK FT
You can access the FollowMyHealth Patient Portal offered by [facility] by registering at the following website: [email or web address]. By joining [employer]’s FollowMyHealth portal, you will also be able to view your health information using other applications (apps) compatible with our system.

## 2020-02-28 NOTE — DISCHARGE NOTE PROVIDER - PROVIDER TOKENS
PROVIDER:[TOKEN:[[id number]],SCHEDULEDAPPT:[03/04/2020],SCHEDULEDAPPTTIME:[11:00 PM]] PROVIDER:[TOKEN:[[id number]],SCHEDULEDAPPT:[03/04/2020],SCHEDULEDAPPTTIME:[11:00 AM]]

## 2020-02-28 NOTE — CONSULT NOTE ADULT - SUBJECTIVE AND OBJECTIVE BOX
CC: difficulty breathing with a lot of walking    HPI: 29F 17 weeks pregnant p/w SOB, CP and hypoxia. Pt endorses having SOB over the past 9 weeks worse than her 3 prior pregnancies. She was sent for follow up with pulm by her PMD. Reportedly during pulmonology visit today she was found to be hypoxic after walking around. Cannot remember exact 02 read. She denies using nail polish or fake nails. She was then sent in for inpatient TTE. Pt endorses some midsternal pleuritic chest pain on occasion. Denies any dizziness, recent travel or history of blood clots. Endorses cough productive of green sputum over last 4 days but denies fevers or chills. CTA negative for PE  ENT consulted to evaluates vocal cords. Pt denies change in voice or difficulty eating or drinking      PAST MEDICAL & SURGICAL HISTORY:  Urinary tract infection  PID (pelvic inflammatory disease)  S/P cholecystectomy    Allergies    No Known Allergies    Intolerances      MEDICATIONS  (STANDING):  prenatal multivitamin 1 Tablet(s) Oral daily    MEDICATIONS  (PRN):  acetaminophen   Tablet .. 650 milliGRAM(s) Oral every 6 hours PRN Mild Pain (1 - 3), Moderate Pain (4 - 6), Severe Pain (7 - 10)      Social History: Denies smoking or ETOH. has 3 children at home.  smokes but away from family    Family history:   No pertinent family history      ROS:   ENT: all negative except as noted in HPI   Skin: No itching, dryness, rash, changes to hair, or skin masses  CV: denies palpitations  Pulm: denies SOB, cough, hemoptysis  GI: denies change in appetite, indigestion, n/v  : denies pertinent urinary symptoms, urgency  Neuro: denies numbness/tingling, loss of sensation  Psych: denies anxiety  MS: denies muscle weakness, instability  Heme: denies easy bruising or bleeding  Endo: denies heat/cold intolerance, excessive sweating  Vascular: denies LE edema    Vital Signs Last 24 Hrs  T(C): 36.8 (28 Feb 2020 13:18), Max: 36.9 (28 Feb 2020 00:36)  T(F): 98.3 (28 Feb 2020 13:18), Max: 98.4 (28 Feb 2020 00:36)  HR: 85 (28 Feb 2020 13:18) (72 - 99)  BP: 113/73 (28 Feb 2020 13:18) (102/68 - 117/76)  BP(mean): --  RR: 18 (28 Feb 2020 13:18) (18 - 18)  SpO2: 98% (28 Feb 2020 13:18) (97% - 99%)                          11.1   8.34  )-----------( 266      ( 27 Feb 2020 06:25 )             33.3    02-27    138  |  106  |  9   ----------------------------<  92  3.7   |  18<L>  |  0.47<L>    Ca    9.4      27 Feb 2020 06:25    TPro  7.3  /  Alb  3.7  /  TBili  0.2  /  DBili  x   /  AST  16  /  ALT  14  /  AlkPhos  151<H>  02-27       PHYSICAL EXAM:  Gen: NAD  Skin: No rashes, bruises, or lesions  Head: Normocephalic, Atraumatic  Face: no edema, erythema, or fluctuance. Parotid glands soft without mass  Eyes: no scleral injection  Nose: Nares bilaterally patent, no discharge  Mouth: No Stridor / Drooling / Trismus.  Mucosa moist, tongue/uvula midline, oropharynx clear  Neck: Flat, supple, no lymphadenopathy, trachea midline, no masses  Lymphatic: No lymphadenopathy  Resp: breathing easily, no stridor  CV: no peripheral edema/cyanosis  GI: nondistended   Peripheral vascular: no JVD or edema  Neuro: facial nerve intact, no facial droop      Fiberoptic Indirect laryngoscopy:  (Scope #2 used)  Reason for Laryngoscopy: r/o VC dysfunction    Patient was unable to cooperate with mirror.  Nasopharynx, oropharynx, and hypopharynx clear, no bleeding. Tongue base, posterior pharyngeal wall, vallecula, epiglottis, and subglottis appear normal. No erythema, edema, pooling of secretions, masses or lesions. Airway patent, no foreign body visualized. No glottic/supraglottic edema. True vocal cords, arytenoids, vestibular folds, ventricles, pyriform sinuses, and aryepiglottic folds appear normal bilaterally. Vocal cords mobile with good contact b/l.

## 2020-02-28 NOTE — DISCUSSION/SUMMARY
[FreeTextEntry1] : Admitted to hospital for difficulty breathing while 17 weeks pregnant (sent by Dr. Lee)\par CTPA negative. \par She was d/c 2/28.\par I LM to see if we can help in any way. \par \par

## 2020-02-28 NOTE — PROGRESS NOTE ADULT - ATTENDING COMMENTS
Pt seen and examined. 29 F 17 weeks pregnant with SOB and hypoxia on exertion. No rhythm abnormalities on telemetry. CTPA negative for PE, LE dopplers negative for DVT, normal TTE. Continues to have GIRARD with no clear etiology. No desaturation noted during hospitalization. Suggest ENT evalv to r/o VCD during an episode of SOB. At this point all life threatening etiologies have been ruled out. If ENT evalv is unrevealing then would have her follow up with Dr. Rosa jimenez outpt.
Pt seen and examined, 29 F 17 weeks pregnant with SOB and hypoxia on exertion. Currently all work up unremarkable as noted above. Awaiting CTPA to r/o PE. LE dopplers negative for DVT. Cont tele monitoring, ? SOB 2/2 arrythmia. If CTPA is negative/ telemetry monitoring is unrevealing, would consider a scope by ENT during an episode to assess VC function.
CTA negative. Unclear etiology of symptoms. Monitor on tele.
29 F , 17 wks pregnant aw dyspnea, reported hypoxia. Echo wnl, CTA w no PE. Ambulated w pt around floor today, dyspneic but saturating 99% on room air.     D/c home today w outpt f/u. D/c time 40 mins.
Echo unremarkable. Await CTA.

## 2020-02-28 NOTE — DISCHARGE NOTE PROVIDER - NSDCFUSCHEDAPPT_GEN_ALL_CORE_FT
JOHN MORROW ; 03/24/2020 ; NPP OB/GYN Antepart [address]  JOHN MORROW ; 03/31/2020 ; NPP Med Int [address] [facility] JOHN MORROW ; 03/04/2020 ; NPP Med Pulm [address]  JOHN MORROW ; 03/24/2020 ; NPP OB/GYN Antepart [address]  JOHN MORROW ; 03/31/2020 ; NPP Med Int [address] [facility] JOHN MORROW ; 03/04/2020 ; NPP Med Pulm [address]  JOHN MORROW ; 03/24/2020 ; NPP OB/GYN [address]tepart [address]  JOHN MORROW ; 03/31/2020 ; NPP Med Int [address] [facility] O[facility]HN MORROW ; 03/04/2020 ; NPP Med Pulm [address]  JOHN MORROW ; 03/24/2020 ; NPP OB/GYN Antepart [address]  JOHN MORROW ; 03/31/2020 ; NPP Med Int [address] [facility] JOHN MORROW ; 03/04/2020 ; NPP Med Pulm [address]  JOHN MORROW ; 03/24/2020 ; NPP OB/GYN Antepart [address]  JOHN MORROW ; 03/31/2020 ; NPP Med Int [address] [facility] JOHN MORROW ; 03/04/2020 ; NPP Med Pulm [address]  JOHN MORROW ; 03/24/2020 ; NPP OB/GYN [address]part [address]  JOHN MORROW ; 03/31/2020 ; NPP Med Int [address] [facility] JOHN MORROWH ; 03/04/2020 ; NPP OB/GYN  [facility]  JOHN MORROWH ; 03/04/2020 ; NPP Med Pulm [address] [facility]  JOHN MORROW ; 03/24/2020 ; NPP OB/GYN [address]part [address]  JOHN MORROW ; 03/31/2020 ; NPP Med Int [address] [facility]

## 2020-02-28 NOTE — PROGRESS NOTE ADULT - PROBLEM SELECTOR PLAN 1
Unclear etiology at this point. EKG unremarkable. Dopplers negative. Denies hx of VTE. Reportedly some suspicion for TINO outpatient. D-dimer elevated but likely to be elevated in pregnancy. Note negative pBNP and troponin. CXR neg  - TTE no evidence of avalvular dysfunction or cardiomyopathy  - CTA chest neg for PE  - pulm following appreciate recs ([id number])  - No A-a graident  - ?? arrythmia, like SVT provoked when ambulating, will put patient on tele Unclear etiology at this point. EKG unremarkable. Dopplers negative. Denies hx of VTE. Reportedly some suspicion for TINO outpatient. D-dimer elevated but likely to be elevated in pregnancy. Note negative pBNP and troponin. CXR neg  - TTE no evidence of avalvular dysfunction or cardiomyopathy  - CTA chest neg for PE  - pulm following appreciate recs ([id number]): consider scope by ENT  - No A-a graident  - ?? arrythmia, like SVT provoked when ambulating, will put patient on tele Unclear etiology at this point. EKG unremarkable. Dopplers negative. Denies hx of VTE. Reportedly some suspicion for ITNO outpatient. D-dimer elevated but likely to be elevated in pregnancy. Note negative pBNP and troponin. CXR neg  - TTE no evidence of avalvular dysfunction or cardiomyopathy  - CTA chest neg for PE  - pulm following appreciate recs ([id number]): can consider scope by ENT to check vocal cords  - No A-a graident  - ?? arrythmia, like SVT provoked when ambulating, patient on tele

## 2020-02-28 NOTE — DISCHARGE NOTE PROVIDER - HOSPITAL COURSE
CTA chest was negative for PE.    Only sinus tach seen on tele.    ECHO unremarkable. 29F 17 weeks pregnant p/w SOB, CP and hypoxia. Pt endorses having SOB over the past 9 weeks worse than her 3 prior pregnancies. She was sent for follow up with pulm by her PMD. Reportedly during pulmonology visit today she was found to be hypoxic after walking around. Cannot remember exact 02 read. She denies using nail polish or fake nails. She was then sent in for inpatient TTE. Pt endorses some midsternal pleuritic chest pain on occasion. Denies any dizziness, recent travel or history of blood clots. Endorses cough productive of green sputum over last 4 days but denies fevers or chills.         Hospital course c/b migraine which was treated with PO tylenol.        Specialities consulted:     Cardiology: ECHO unremarkable, only sinus tach seen on telemetry, BNP/trops negative, euvolemic on exam. No c/f cardiac cause of SOB/GIRARD    Pulmonology: CXR showed clear lungs, CTA chest was negative for PE. Ambulatory Sat WNL    ENT:     Obstetrics: aware the patient was admitted and provided fetal monitoring but no acute intervention needed.        At this time the patient is hemodynamically stable and ready for discharge. 29F 17 weeks pregnant p/w SOB, CP and hypoxia. Pt endorses having SOB over the past 9 weeks worse than her 3 prior pregnancies. She was sent for follow up with pulm by her PMD. Reportedly during pulmonology visit today she was found to be hypoxic after walking around. Cannot remember exact 02 read. She denies using nail polish or fake nails. She was then sent in for inpatient TTE. Pt endorses some midsternal pleuritic chest pain on occasion. Denies any dizziness, recent travel or history of blood clots. Endorses cough productive of green sputum over last 4 days but denies fevers or chills.         Hospital course c/b migraine which was treated with PO tylenol.        Specialities consulted:     Cardiology: ECHO unremarkable, only sinus tach seen on telemetry, BNP/trops negative, euvolemic on exam. No c/f cardiac cause of SOB/GIRARD    Pulmonology: CXR showed clear lungs, CTA chest was negative for PE. Ambulatory Sat WNL    ENT: Laryngoscopy revealed mobile vocal cords b/l with no concern for vocal cord dysfunction    Obstetrics: aware the patient was admitted and provided fetal monitoring but no acute intervention needed.        At discharge there was still no clear etiology for the patient's shortness of breath. The patient was advised to follow-up with pulmonology after discharge.    At this time the patient is hemodynamically stable and ready for discharge. 29F, 17 weeks pregnant p/w SOB, CP and hypoxia. C/o SOB over the past 9 weeks worse than her 3 prior pregnancies. She was sent for follow up with pulm by her PMD. Reportedly during pulmonology visit,  she was found to be hypoxic after walking around. She denies using nail polish or fake nails. She was then sent in for inpatient TTE. Pt endorses some midsternal pleuritic chest pain on occasion. Denied any dizziness, recent travel or history of blood clots. Endorsed cough productive of green sputum over last 4 days but denied fevers or chills.         Admitted for further w/u and monitoring. Cardiology: ECHO unremarkable, only sinus tach seen on telemetry, BNP/trops negative, euvolemic on exam. No c/f cardiac cause of SOB/GIRARD    Pulmonology: CXR showed clear lungs, CTA chest was negative for PE. Ambulatory Sat WNL    ENT: Laryngoscopy revealed mobile vocal cords b/l with no concern for vocal cord dysfunction    Obstetrics: aware the patient was admitted and provided fetal monitoring but no acute intervention needed.        At discharge there was still no clear etiology for the patient's shortness of breath. She ambulated with persistent dyspnea but no hypoxia. The patient was advised to follow-up with pulmonology after discharge.        Diagnoses: Dyspnea; Hypoxia; 17 weeks pregnant status; Metabolic acidosis

## 2020-02-28 NOTE — DISCHARGE NOTE PROVIDER - NSDCFUADDAPPT_GEN_ALL_CORE_FT
Please follow up in clinic with your primary care provider as well as obgyn. Please also keep your already scheduled pulmonology appointment. Please follow up in clinic with your primary care provider as well as obgyn. Please also keep your already scheduled pulmonology appointment on March 4th and 11am.

## 2020-02-28 NOTE — DISCHARGE NOTE NURSING/CASE MANAGEMENT/SOCIAL WORK - NSDCFUADDAPPT_GEN_ALL_CORE_FT
Please follow up in clinic with your primary care provider as well as obgyn. Please also keep your already scheduled pulmonology appointment on March 4th and 11am.

## 2020-02-28 NOTE — DISCHARGE NOTE PROVIDER - NSFOLLOWUPCLINICS_GEN_ALL_ED_FT
[facility]  Pulmonary Medicine  [address], [address]  [place] [address]  Phone: [phone]  Fax:   Follow Up Time:

## 2020-02-28 NOTE — CHART NOTE - NSCHARTNOTEFT_GEN_A_CORE
R3 Antepartum Note    Patient seen and examined at bedside.  No acute complaints.  Pt continues to have intermittent SOB with ambulation; no SOB at rest. +congestion/dry cough. Denies CP, palpitations, dyspnea on exertion, fevers/chills.   Pt has not yet felt fetal movement. denies LOF, VB, ctx/cramping, CP, N/V, fevers, and chills. Denies HA, blurry vision, RUQ/epigastric pain, new onset swelling.     Vital Signs Last 24 Hours  T(C): 36.8 (20 @ 13:18), Max: 36.9 (20 @ 00:36)  HR: 85 (20 @ 13:18) (72 - 99)  BP: 113/73 (20 @ 13:18) (102/68 - 117/76)  RR: 18 (20 @ 13:18) (18 - 18)  SpO2: 98% (20 @ 13:18) (97% - 99%)    Physical Exam:  General: NAD  Resp: no increased wob sitting in bed or lying flat in bed  Abdomen: Soft, non-tender, gravid  Ext: No pain or swelling  BSS:+, +gross fetal movement     MEDICATIONS  (STANDING):  prenatal multivitamin 1 Tablet(s) Oral daily    MEDICATIONS  (PRN):  acetaminophen   Tablet .. 650 milliGRAM(s) Oral every 6 hours PRN Mild Pain (1 - 3), Moderate Pain (4 - 6), Severe Pain (7 - 10)    A/P: 29y  at 17w admitted for work up of SOB with     with PMHx obesity, sickle cell trait, migraine headaches, and GERD p/w SOB and pleuritic chest pain, work thus far negative with negative LE doppler and echo, CTPA pending. Unclear etiology at this time for dyspnea and hypoxemia on insertion. Initial workup including normal cardiac enzymes and EKG. LE Doppler (-). D-Dimer slightly elevated in the setting of pregnancy.   Concern for PE given hypercoagulable state of pregnancy. Appreciate primary team recommendations for further workup and management of SOB/dyspnea. Pt with pre-viable pregnancy at this time. Reassuring FH at this time. Will continue to monitor fetal status as appropriate. R3 Antepartum Note    Patient seen and examined at bedside.  No acute complaints.  Pt continues to have intermittent SOB with ambulation; no SOB at rest. +congestion/dry cough. Denies CP, palpitations, dyspnea on exertion, fevers/chills.   Pt has not yet felt fetal movement. denies LOF, VB, ctx/cramping, CP, N/V, fevers, and chills. Denies HA, blurry vision, RUQ/epigastric pain, new onset swelling.     Vital Signs Last 24 Hours  T(C): 36.8 (20 @ 13:18), Max: 36.9 (20 @ 00:36)  HR: 85 (20 @ 13:18) (72 - 99)  BP: 113/73 (20 @ 13:18) (102/68 - 117/76)  RR: 18 (20 @ 13:18) (18 - 18)  SpO2: 98% (20 @ 13:18) (97% - 99%)    Physical Exam:  General: NAD  Resp: no increased wob sitting in bed or lying flat in bed  Abdomen: Soft, non-tender, gravid  Ext: No pain or swelling  BSS:+, +gross fetal movement     MEDICATIONS  (STANDING):  prenatal multivitamin 1 Tablet(s) Oral daily    MEDICATIONS  (PRN):  acetaminophen   Tablet .. 650 milliGRAM(s) Oral every 6 hours PRN Mild Pain (1 - 3), Moderate Pain (4 - 6), Severe Pain (7 - 10)    A/P: 29y  at 17w admitted for work up of SOB.   -CT angio: negative for PE  -CXR: clear  -LE negative  -Echo: normal EF 55-60%  -ENT consult pending t  -FH check confirmed at 148bpm. No acute OB complaints or intervention at this time. Pt to follow up as scheduled in OB clinic. Pt with pre-viable pregnancy at this time. Reassuring FH at this time. Will continue to monitor fetal status as appropriate.  -please reconsult prn.   - Appreciate primary team recommendations for further workup and management of SOB/dyspnea.     d/w Dr. Raulito Mckeon PGY3

## 2020-03-01 ENCOUNTER — OUTPATIENT (OUTPATIENT)
Dept: OUTPATIENT SERVICES | Facility: HOSPITAL | Age: 30
LOS: 1 days | End: 2020-03-01
Payer: MEDICAID

## 2020-03-01 DIAGNOSIS — Z90.49 ACQUIRED ABSENCE OF OTHER SPECIFIED PARTS OF DIGESTIVE TRACT: Chronic | ICD-10-CM

## 2020-03-01 PROCEDURE — G9001: CPT

## 2020-03-04 ENCOUNTER — LABORATORY RESULT (OUTPATIENT)
Age: 30
End: 2020-03-04

## 2020-03-04 ENCOUNTER — OUTPATIENT (OUTPATIENT)
Dept: OUTPATIENT SERVICES | Facility: HOSPITAL | Age: 30
LOS: 1 days | End: 2020-03-04
Payer: MEDICAID

## 2020-03-04 ENCOUNTER — APPOINTMENT (OUTPATIENT)
Dept: PULMONOLOGY | Facility: CLINIC | Age: 30
End: 2020-03-04
Payer: MEDICAID

## 2020-03-04 ENCOUNTER — NON-APPOINTMENT (OUTPATIENT)
Age: 30
End: 2020-03-04

## 2020-03-04 ENCOUNTER — APPOINTMENT (OUTPATIENT)
Dept: OBGYN | Facility: CLINIC | Age: 30
End: 2020-03-04
Payer: MEDICAID

## 2020-03-04 VITALS — DIASTOLIC BLOOD PRESSURE: 60 MMHG | SYSTOLIC BLOOD PRESSURE: 100 MMHG | WEIGHT: 203.25 LBS | BODY MASS INDEX: 37.4 KG/M2

## 2020-03-04 VITALS
RESPIRATION RATE: 16 BRPM | HEART RATE: 87 BPM | SYSTOLIC BLOOD PRESSURE: 133 MMHG | DIASTOLIC BLOOD PRESSURE: 82 MMHG | HEIGHT: 61.81 IN | BODY MASS INDEX: 37.17 KG/M2 | WEIGHT: 202 LBS | OXYGEN SATURATION: 100 % | TEMPERATURE: 97.6 F

## 2020-03-04 DIAGNOSIS — Z90.49 ACQUIRED ABSENCE OF OTHER SPECIFIED PARTS OF DIGESTIVE TRACT: Chronic | ICD-10-CM

## 2020-03-04 DIAGNOSIS — Z34.80 ENCOUNTER FOR SUPERVISION OF OTHER NORMAL PREGNANCY, UNSPECIFIED TRIMESTER: ICD-10-CM

## 2020-03-04 PROCEDURE — 99214 OFFICE O/P EST MOD 30 MIN: CPT

## 2020-03-04 PROCEDURE — 81003 URINALYSIS AUTO W/O SCOPE: CPT

## 2020-03-04 PROCEDURE — G0463: CPT

## 2020-03-04 PROCEDURE — 82105 ALPHA-FETOPROTEIN SERUM: CPT

## 2020-03-04 PROCEDURE — 84439 ASSAY OF FREE THYROXINE: CPT

## 2020-03-04 PROCEDURE — 82677 ASSAY OF ESTRIOL: CPT

## 2020-03-04 PROCEDURE — 84702 CHORIONIC GONADOTROPIN TEST: CPT

## 2020-03-04 PROCEDURE — 99213 OFFICE O/P EST LOW 20 MIN: CPT | Mod: NC

## 2020-03-04 PROCEDURE — 84443 ASSAY THYROID STIM HORMONE: CPT

## 2020-03-04 PROCEDURE — 86336 INHIBIN A: CPT

## 2020-03-04 PROCEDURE — 36415 COLL VENOUS BLD VENIPUNCTURE: CPT

## 2020-03-04 PROCEDURE — 84704 HCG FREE BETACHAIN TEST: CPT

## 2020-03-04 NOTE — HISTORY OF PRESENT ILLNESS
[TextBox_4] : 29 year old woman 18 weeks pregnant with her 4th child returns for follow up after recent hospitalization at [facility].  I had referred her to the ED on 2/25 after she presented with significant GIRARD and desaturation to low 80s in the office.  I was concerned about PE.\par \par She had a CTPA which was negative for PE or parenchymal lung disease.  Echo was normal without evidence of pulmonary hypertension.  LE dopplers were negative.  \par \par She complains of nasal stuffiness today with some cough.  Denies fever or night sweats.  Denies chest discomfort or wheezing.

## 2020-03-04 NOTE — PHYSICAL EXAM
[No Acute Distress] : no acute distress [Normal Oropharynx] : normal oropharynx [Normal Appearance] : normal appearance [No Neck Mass] : no neck mass [Normal S1, S2] : normal s1, s2 [Normal Rate/Rhythm] : normal rate/rhythm [No Murmurs] : no murmurs [No Resp Distress] : no resp distress [Clear to Auscultation Bilaterally] : clear to auscultation bilaterally [No Abnormalities] : no abnormalities [No Clubbing] : no clubbing [Normal Gait] : normal gait [Normal Color/ Pigmentation] : normal color/ pigmentation [No Edema] : no edema [No Focal Deficits] : no focal deficits [Oriented x3] : oriented x3 [Normal Affect] : normal affect

## 2020-03-04 NOTE — ASSESSMENT
[FreeTextEntry1] : 29 year old woman 18 weeks pregnant who presents for follow up after being referred to the ED for dyspnea and desaturation.  Workup for PE including CTPA, echo and LE dopplers was negative.  Now with nasal congestion that could be a part of her pregnancy or related to allergic rhinitis.  She is not febrile or have other systemic manifestations.\par \par On PE VSS and she does not desaturate with ambulation.  Lungs are clear to exam at rest and after exertion.  There is no edema.\par \par All studies including CTPA personally reviewed by me.\par \par Impression:  Dyspnea related to weight gain and nasal congestion\par Plan:\par Fluticasone nasal spray twice daily\par ADvised to call with new symptoms and follow up as needed.

## 2020-03-09 DIAGNOSIS — O26.899 OTHER SPECIFIED PREGNANCY RELATED CONDITIONS, UNSPECIFIED TRIMESTER: ICD-10-CM

## 2020-03-09 DIAGNOSIS — R79.89 OTHER SPECIFIED ABNORMAL FINDINGS OF BLOOD CHEMISTRY: ICD-10-CM

## 2020-03-09 DIAGNOSIS — K21.9 GASTRO-ESOPHAGEAL REFLUX DISEASE WITHOUT ESOPHAGITIS: ICD-10-CM

## 2020-03-09 DIAGNOSIS — O21.9 VOMITING OF PREGNANCY, UNSPECIFIED: ICD-10-CM

## 2020-03-09 DIAGNOSIS — E66.9 OBESITY, UNSPECIFIED: ICD-10-CM

## 2020-03-10 LAB
1ST TRIMESTER DATA: SIGNIFICANT CHANGE UP
2ND TRIMESTER DATA: SIGNIFICANT CHANGE UP
ADDENDUM DOC: SIGNIFICANT CHANGE UP
AFP SERPL-ACNC: SIGNIFICANT CHANGE UP
AFP SERPL-ACNC: SIGNIFICANT CHANGE UP
B-HCG FREE SERPL-MCNC: SIGNIFICANT CHANGE UP
B-HCG FREE SERPL-MCNC: SIGNIFICANT CHANGE UP
CLINICAL BIOCHEMIST REVIEW: SIGNIFICANT CHANGE UP
DEMOGRAPHIC DATA: SIGNIFICANT CHANGE UP
INHIBIN A SERPL-MCNC: SIGNIFICANT CHANGE UP
NT: SIGNIFICANT CHANGE UP
PAPP-A SERPL-ACNC: SIGNIFICANT CHANGE UP
SCREEN-FOOTER: SIGNIFICANT CHANGE UP
U ESTRIOL SERPL-SCNC: SIGNIFICANT CHANGE UP

## 2020-03-24 ENCOUNTER — APPOINTMENT (OUTPATIENT)
Dept: OBGYN | Facility: CLINIC | Age: 30
End: 2020-03-24
Payer: MEDICAID

## 2020-03-24 ENCOUNTER — ASOB RESULT (OUTPATIENT)
Age: 30
End: 2020-03-24

## 2020-03-24 ENCOUNTER — OUTPATIENT (OUTPATIENT)
Dept: OUTPATIENT SERVICES | Facility: HOSPITAL | Age: 30
LOS: 1 days | End: 2020-03-24
Payer: MEDICAID

## 2020-03-24 ENCOUNTER — APPOINTMENT (OUTPATIENT)
Dept: ANTEPARTUM | Facility: CLINIC | Age: 30
End: 2020-03-24
Payer: MEDICAID

## 2020-03-24 ENCOUNTER — NON-APPOINTMENT (OUTPATIENT)
Age: 30
End: 2020-03-24

## 2020-03-24 ENCOUNTER — LABORATORY RESULT (OUTPATIENT)
Age: 30
End: 2020-03-24

## 2020-03-24 VITALS — BODY MASS INDEX: 36.8 KG/M2 | DIASTOLIC BLOOD PRESSURE: 80 MMHG | SYSTOLIC BLOOD PRESSURE: 120 MMHG | WEIGHT: 200 LBS

## 2020-03-24 DIAGNOSIS — Z90.49 ACQUIRED ABSENCE OF OTHER SPECIFIED PARTS OF DIGESTIVE TRACT: Chronic | ICD-10-CM

## 2020-03-24 DIAGNOSIS — Z34.80 ENCOUNTER FOR SUPERVISION OF OTHER NORMAL PREGNANCY, UNSPECIFIED TRIMESTER: ICD-10-CM

## 2020-03-24 PROCEDURE — 76811 OB US DETAILED SNGL FETUS: CPT

## 2020-03-24 PROCEDURE — 99213 OFFICE O/P EST LOW 20 MIN: CPT | Mod: NC

## 2020-03-25 LAB
CULTURE RESULTS: SIGNIFICANT CHANGE UP
SPECIMEN SOURCE: SIGNIFICANT CHANGE UP

## 2020-03-31 ENCOUNTER — APPOINTMENT (OUTPATIENT)
Dept: INTERNAL MEDICINE | Facility: CLINIC | Age: 30
End: 2020-03-31

## 2020-04-03 DIAGNOSIS — Z34.92 ENCOUNTER FOR SUPERVISION OF NORMAL PREGNANCY, UNSPECIFIED, SECOND TRIMESTER: ICD-10-CM

## 2020-04-07 ENCOUNTER — INPATIENT (INPATIENT)
Facility: HOSPITAL | Age: 30
LOS: 2 days | Discharge: HOME | DRG: 831 | End: 2020-04-10
Attending: STUDENT IN AN ORGANIZED HEALTH CARE EDUCATION/TRAINING PROGRAM | Admitting: HOSPITALIST
Payer: MEDICAID

## 2020-04-07 VITALS
RESPIRATION RATE: 18 BRPM | HEART RATE: 112 BPM | SYSTOLIC BLOOD PRESSURE: 114 MMHG | DIASTOLIC BLOOD PRESSURE: 80 MMHG | OXYGEN SATURATION: 99 % | TEMPERATURE: 99 F | WEIGHT: 199.96 LBS | HEIGHT: 61 IN

## 2020-04-07 DIAGNOSIS — Z90.49 ACQUIRED ABSENCE OF OTHER SPECIFIED PARTS OF DIGESTIVE TRACT: Chronic | ICD-10-CM

## 2020-04-07 LAB
ALBUMIN SERPL ELPH-MCNC: 3.7 G/DL — SIGNIFICANT CHANGE UP (ref 3.3–5)
ALP SERPL-CCNC: 330 U/L — HIGH (ref 40–120)
ALT FLD-CCNC: 624 U/L — HIGH (ref 10–45)
ANION GAP SERPL CALC-SCNC: 14 MMOL/L — SIGNIFICANT CHANGE UP (ref 5–17)
APPEARANCE UR: ABNORMAL
APTT BLD: 32.9 SEC — SIGNIFICANT CHANGE UP (ref 27.5–36.3)
AST SERPL-CCNC: 674 U/L — HIGH (ref 10–40)
BACTERIA # UR AUTO: ABNORMAL
BASOPHILS # BLD AUTO: 0.01 K/UL — SIGNIFICANT CHANGE UP (ref 0–0.2)
BASOPHILS NFR BLD AUTO: 0.1 % — SIGNIFICANT CHANGE UP (ref 0–2)
BILIRUB SERPL-MCNC: 0.7 MG/DL — SIGNIFICANT CHANGE UP (ref 0.2–1.2)
BILIRUB UR-MCNC: ABNORMAL
BUN SERPL-MCNC: 7 MG/DL — SIGNIFICANT CHANGE UP (ref 7–23)
CALCIUM SERPL-MCNC: 9.2 MG/DL — SIGNIFICANT CHANGE UP (ref 8.4–10.5)
CHLORIDE SERPL-SCNC: 102 MMOL/L — SIGNIFICANT CHANGE UP (ref 96–108)
CO2 SERPL-SCNC: 19 MMOL/L — LOW (ref 22–31)
COLOR SPEC: YELLOW — SIGNIFICANT CHANGE UP
CREAT SERPL-MCNC: 0.4 MG/DL — LOW (ref 0.5–1.3)
DIFF PNL FLD: NEGATIVE — SIGNIFICANT CHANGE UP
EOSINOPHIL # BLD AUTO: 0.02 K/UL — SIGNIFICANT CHANGE UP (ref 0–0.5)
EOSINOPHIL NFR BLD AUTO: 0.3 % — SIGNIFICANT CHANGE UP (ref 0–6)
EPI CELLS # UR: 9 /HPF — HIGH
FIBRINOGEN PPP-MCNC: 472 MG/DL — SIGNIFICANT CHANGE UP (ref 350–510)
GLUCOSE SERPL-MCNC: 101 MG/DL — HIGH (ref 70–99)
GLUCOSE UR QL: NEGATIVE — SIGNIFICANT CHANGE UP
HCT VFR BLD CALC: 33.6 % — LOW (ref 34.5–45)
HGB BLD-MCNC: 11.3 G/DL — LOW (ref 11.5–15.5)
HYALINE CASTS # UR AUTO: 3 /LPF — HIGH (ref 0–2)
IMM GRANULOCYTES NFR BLD AUTO: 1.6 % — HIGH (ref 0–1.5)
INR BLD: 1.17 RATIO — HIGH (ref 0.88–1.16)
KETONES UR-MCNC: SIGNIFICANT CHANGE UP
LEUKOCYTE ESTERASE UR-ACNC: NEGATIVE — SIGNIFICANT CHANGE UP
LYMPHOCYTES # BLD AUTO: 1.33 K/UL — SIGNIFICANT CHANGE UP (ref 1–3.3)
LYMPHOCYTES # BLD AUTO: 18 % — SIGNIFICANT CHANGE UP (ref 13–44)
MCHC RBC-ENTMCNC: 26.4 PG — LOW (ref 27–34)
MCHC RBC-ENTMCNC: 33.6 GM/DL — SIGNIFICANT CHANGE UP (ref 32–36)
MCV RBC AUTO: 78.5 FL — LOW (ref 80–100)
MONOCYTES # BLD AUTO: 0.56 K/UL — SIGNIFICANT CHANGE UP (ref 0–0.9)
MONOCYTES NFR BLD AUTO: 7.6 % — SIGNIFICANT CHANGE UP (ref 2–14)
NEUTROPHILS # BLD AUTO: 5.34 K/UL — SIGNIFICANT CHANGE UP (ref 1.8–7.4)
NEUTROPHILS NFR BLD AUTO: 72.4 % — SIGNIFICANT CHANGE UP (ref 43–77)
NITRITE UR-MCNC: NEGATIVE — SIGNIFICANT CHANGE UP
NRBC # BLD: 0 /100 WBCS — SIGNIFICANT CHANGE UP (ref 0–0)
PH UR: 6 — SIGNIFICANT CHANGE UP (ref 5–8)
PLATELET # BLD AUTO: 237 K/UL — SIGNIFICANT CHANGE UP (ref 150–400)
POTASSIUM SERPL-MCNC: 3.3 MMOL/L — LOW (ref 3.5–5.3)
POTASSIUM SERPL-SCNC: 3.3 MMOL/L — LOW (ref 3.5–5.3)
PROT SERPL-MCNC: 7.3 G/DL — SIGNIFICANT CHANGE UP (ref 6–8.3)
PROT UR-MCNC: ABNORMAL
PROTHROM AB SERPL-ACNC: 13.4 SEC — HIGH (ref 10–12.9)
RBC # BLD: 4.28 M/UL — SIGNIFICANT CHANGE UP (ref 3.8–5.2)
RBC # FLD: 13.3 % — SIGNIFICANT CHANGE UP (ref 10.3–14.5)
RBC CASTS # UR COMP ASSIST: 3 /HPF — SIGNIFICANT CHANGE UP (ref 0–4)
SARS-COV-2 RNA SPEC QL NAA+PROBE: SIGNIFICANT CHANGE UP
SODIUM SERPL-SCNC: 135 MMOL/L — SIGNIFICANT CHANGE UP (ref 135–145)
SP GR SPEC: 1.02 — SIGNIFICANT CHANGE UP (ref 1.01–1.02)
UROBILINOGEN FLD QL: ABNORMAL
WBC # BLD: 7.38 K/UL — SIGNIFICANT CHANGE UP (ref 3.8–10.5)
WBC # FLD AUTO: 7.38 K/UL — SIGNIFICANT CHANGE UP (ref 3.8–10.5)
WBC UR QL: 6 /HPF — HIGH (ref 0–5)

## 2020-04-07 PROCEDURE — 76815 OB US LIMITED FETUS(S): CPT | Mod: 26

## 2020-04-07 PROCEDURE — 93010 ELECTROCARDIOGRAM REPORT: CPT

## 2020-04-07 PROCEDURE — 99285 EMERGENCY DEPT VISIT HI MDM: CPT

## 2020-04-07 RX ORDER — CEPHALEXIN 500 MG
500 CAPSULE ORAL ONCE
Refills: 0 | Status: COMPLETED | OUTPATIENT
Start: 2020-04-07 | End: 2020-04-07

## 2020-04-07 RX ORDER — POTASSIUM CHLORIDE 20 MEQ
40 PACKET (EA) ORAL ONCE
Refills: 0 | Status: COMPLETED | OUTPATIENT
Start: 2020-04-07 | End: 2020-04-07

## 2020-04-07 RX ADMIN — Medication 500 MILLIGRAM(S): at 21:17

## 2020-04-07 RX ADMIN — Medication 40 MILLIEQUIVALENT(S): at 19:54

## 2020-04-07 NOTE — ED PROVIDER NOTE - EKG #1 DATE/TIME
Hospitalist Progress Note  Charlene Khan NP  Answering service: [phone] from in house phone  Cell: [phone]      Date of Service:  2018  NAME:  Matteo Sung  :  1958  MRN:  [id number]      Admission Summary:   64year old with PMH of COPD, HTN, and Hep C with significant forty plus year drug abuse history. The patient lives in assisted living at [address] [place] [address] in an independent apartment per his brothers. He is   Normally alert and oriented but is not close to his family. He presented with altered mental status and Severe  Sepsis picture. He was admitted and we were consulted to manage these issues    Interval history / Subjective:   Plan today is to likely extubate compassion. I discussed this with [facility] and with brother Suzanne Ayon via phone. Momo Robbins He will call his siblings and give us a time to extubate the patient so we can prepare.          Assessment & Plan:     Severe Sepsis with multisystem organ dysfunction in the setting of staph Endocarditis(POA)  Leukocytosis, Fever, Tachycardia, Hypotension, Elevated Lactate  Vanc, Levaquin and Zosyn since , now Vanc and Zosyn as per ID  Paired blood cultures with MRSA, UA  Chest CT with multiple cavitary lesions?or septic emboli, more likely  Lactate trended down 2.1  CT of abdomen ok, skin examined again and noted weeping to B/L LE  Echo with EF 55-60%, NRWMA, MV vegetation, TV vegetation  CT Surgery and ID following  HIV test negative    Acute Renal Failure  Renal US ok and s/s of medical renal disease  Kidney function elevated but stable at 1.73  Marginal UO  Nephrology following    Acute Metabolic Encephalopathy  Head CT with Subtle area of hyperattenuation in the right posterior parietal  Lobe, concern for septic emboli  Brain MRI ordered but cannot get this due to temporary pacemaker  Neurology on board    Thrombocytopenia  Likely reactive but concerned for DIC vs Liver Dysfunction  Slight improvement today    Paroxysmal Atrial Fibrillation  Converted to NSR on 5/13 on Amio drip   Coagulation contraindicated with platelet function  EKG reviewed with atrial fibrillation and inferior lead T wave abnormality, QT prolonged on 5/11 but appears sinus at present on temp pacer    Elevated Troponin, s/p cardiac arrest, Acute Diastolic Heart Failure  Likely has CAD vs demand ischemia- defer to Cards  temporary pacemaker in place  Repeat Echo with EF 55% with severe MV regurgitation  Dobutamine drip  IV Bumex    Substance Abuse  Heroine and Cocaine abuse  Family unsure of Tobacco or ETOH use    Hepatitis C  HIV negative  AST and T Bili elevated, May be playing a role in thrombocytopenia    Abnormal CT imaging  Cavitary lesions bilaterally, PPD -, AFB culture sent  Airborne precautions dc'd per ID  Pulm following and no indication for bronch at this time    Code status: DNR     DVT prophylaxis: SCDs due to thrombocytopenia    Care Plan discussed with: RN, Attending and brother Suzanne Ayon  Disposition: [facility]     Hospital Problems  Date Reviewed: 5/9/2018          Codes Class Noted POA    Shock ([facility] [address] ICD-10-CM: R57.9  ICD-9-CM: 785.50  5/9/2018 Unknown        * (Principal)Sepsis ([facility] [address] ICD-10-CM: A41.9  ICD-9-CM: 038.9, 995.91  5/9/2018 Unknown                Review of Systems:   Review of systems not obtained due to patient factors. Vital Signs:    Last 24hrs VS reviewed since prior progress note. Most recent are:  Visit Vitals    BP (!) 89/45    Pulse 69    Temp 100 °F (37.8 °C)    Resp 20    Ht 6' (1.829 m)    Wt 74.1 kg (163 lb 4.8 oz)    SpO2 100%    BMI 22.15 kg/m2         Intake/Output Summary (Last 24 hours) at 05/16/18 0941  Last data filed at 05/16/18 0800   Gross per 24 hour   Intake          4346.03 ml   Output             4875 ml   Net          -528.97 ml        Physical Examination:             Constitutional:  Intubated and Sedated   ENT:  ETT, MM dry.  NGT in place. Right SC CVC   Resp:  Vented. Coarse BS throughout. No accessory muscle use   CV:  Regular rhythm, Sinus tachy on tele, 3/6 systolic murmur    GI:  Soft, non distended, non tender. + BS    Musculoskeletal:  + Anasarca with gross edema to all extremities. Restraints in place    Neurologic:  RASS -3      Skin:  Weeping wounds to B/L LE where skin is cracking due to anasarca. Hematologic/Lymphatic/Immunlogic:  No jaundice nor lymph node swelling  :  Normal genitalia. Data Review:    Review and/or order of clinical lab test  Review and/or order of tests in the radiology section of [facility]      Labs:     Recent Labs      05/15/18   0318  05/14/18   0501   WBC  27.5*  29.1*   HGB  11.6*  11.9*   HCT  36.3*  37.0   PLT  54*  43*     Recent Labs      05/15/18   0318  05/14/18   0501   NA  155*  151*   K  4.2  4.4   CL  120*  121*   CO2  26  23   BUN  60*  58*   CREA  1.63*  1.70*   GLU  184*  164*   CA  7.7*  7.4*   MG  2.1  2.2   PHOS  3.7  2.8     Recent Labs      05/15/18   0318  05/14/18   0501   SGOT  149*  162*   ALT  87*  80*   AP  61  65   TBILI  3.4*  4.0*   TP  6.3*  6.4   ALB  1.4*  1.5*   GLOB  4.9*  4.9*     Recent Labs      05/15/18   0318   INR  1.3*   PTP  13.5*      No results for input(s): FE, TIBC, PSAT, FERR in the last 72 hours. No results found for: FOL, RBCF   No results for input(s): PH, PCO2, PO2 in the last 72 hours. No results for input(s): CPK, CKNDX, TROIQ in the last 72 hours. No lab exists for component: CPKMB  Lab Results   Component Value Date/Time    Cholesterol, total <50 05/10/2018 04:32 AM    HDL Cholesterol 9 05/10/2018 04:32 AM    LDL, calculated  05/10/2018 04:32 AM     Unable to calculate LDL or VLDL due to low cholesterol.     Triglyceride 173 (H) 05/10/2018 04:32 AM    CHOL/HDL Ratio Cannot be calculated 05/10/2018 04:32 AM     Lab Results   Component Value Date/Time    Glucose (POC) 166 (H) 05/14/2018 08:41 PM    Glucose (POC) 112 (H) 05/11/2018 05:33 AM Glucose (POC) 157 (H) 05/09/2018 12:51 PM    Glucose (POC) 96 05/09/2018 05:45 AM    Glucose (POC) 110 (H) 05/09/2018 05:03 AM     Lab Results   Component Value Date/Time    Color DARK YELLOW 05/09/2018 03:20 AM    Appearance CLOUDY (A) 05/09/2018 03:20 AM    Specific gravity 1.016 05/09/2018 03:20 AM    pH (UA) 5.5 05/09/2018 03:20 AM    Protein 30 (A) 05/09/2018 03:20 AM    Glucose NEGATIVE  05/09/2018 03:20 AM    Ketone NEGATIVE  05/09/2018 03:20 AM    Bilirubin SMALL (A) 11/25/2014 01:52 PM    Urobilinogen 1.0 05/09/2018 03:20 AM    Nitrites NEGATIVE  05/09/2018 03:20 AM    Leukocyte Esterase SMALL (A) 05/09/2018 03:20 AM    Epithelial cells FEW 05/09/2018 03:20 AM    Bacteria NEGATIVE  05/09/2018 03:20 AM    WBC 0-4 05/09/2018 03:20 AM    RBC 5-10 05/09/2018 03:20 AM         Medications Reviewed:     Current Facility-Administered Medications   Medication Dose Route Frequency    albuterol-ipratropium (DUO-NEB) 2.5 MG-0.5 MG/3 ML  3 mL Nebulization Q6H PRN    amiodarone (CORDARONE) tablet 400 mg  400 mg Per NG tube Q8H    DOBUTamine (DOBUTREX) 500 MG/250 mL (2000 mcg/mL) in D5W infusion  0-10 mcg/kg/min IntraVENous TITRATE    balsam peru-castor oil (VENELEX)  mg/gram ointment   Topical Q8H    acetaminophen (TYLENOL) solution 650 mg  650 mg Per NG tube Q4H PRN    vancomycin (VANCOCIN) 1250 mg in  ml infusion  1,250 mg IntraVENous Q18H    bumetanide (BUMEX) injection 1 mg  1 mg IntraVENous BID    propofol (DIPRIVAN) infusion  0-30 mcg/kg/min IntraVENous TITRATE    fentaNYL (PF) 900 mcg/30 ml infusion soln  0-200 mcg/hr IntraVENous TITRATE    chlorhexidine (PERIDEX) 0.12 % mouthwash 15 mL  15 mL Oral BID    pantoprazole (PROTONIX) 40 mg in sodium chloride 0.9% 10 mL injection  40 mg IntraVENous DAILY    PHENYLephrine (PF)(PATSY-SYNEPHRINE) 30 mg in 0.9% sodium chloride 250 mL infusion   mcg/min IntraVENous TITRATE    acetaminophen (TYLENOL) suppository 650 mg  650 mg Rectal Q6H PRN    sodium chloride (NS) flush 5-10 mL  5-10 mL IntraVENous Q8H    sodium chloride (NS) flush 5-10 mL  5-10 mL IntraVENous PRN    sodium chloride (NS) flush 5-10 mL  5-10 mL IntraVENous PRN    piperacillin-tazobactam (ZOSYN) 3.375 g in 0.9% sodium chloride (MBP/ADV) 100 mL  3.375 g IntraVENous Q8H    LORazepam (ATIVAN) injection 2 mg  2 mg IntraVENous Q4H PRN    Vancomycin Pharmacy Dosing   Other PRN    sodium chloride (NS) flush 20 mL  20 mL InterCATHeter PRN    sodium chloride (NS) flush 10 mL  10 mL InterCATHeter Q24H    sodium chloride (NS) flush 10 mL  10 mL InterCATHeter PRN    sodium chloride (NS) flush 10 mL  10 mL InterCATHeter Q8H    alteplase (CATHFLO) 1 mg in sterile water (preservative free) 1 mL injection  1 mg InterCATHeter PRN     ______________________________________________________________________  EXPECTED LENGTH OF STAY: 12d 12h  ACTUAL LENGTH OF STAY:          [address] 07-Apr-2020 20:28

## 2020-04-07 NOTE — ED PROVIDER NOTE - PHYSICAL EXAMINATION
GEN: NAD, well appearing  HEENT: NCAT, MMM, Trachea midline, normal conjunctiva, perrl  CHEST/LUNGS: Non-tachypneic, CTAB, bilateral breath sounds  CARDIAC: no murmurs, no rubs, no gallops  ABDOMEN: Soft, NTND, No rebound/guarding  MSK: No edema, no gross deformity of extremities  SKIN: No rashes, no petechiae, no vesicles  NEURO: CN grossly intact, AAOx3  PSYCH: Normal mood and affect

## 2020-04-07 NOTE — ED PROVIDER NOTE - ATTENDING CONTRIBUTION TO CARE
Attending MD Del Toro:   I personally have seen and examined this patient.  ACP, Resident, medical student note reviewed and agree on plan of care and except where noted.     see mdm.

## 2020-04-07 NOTE — ED ADULT TRIAGE NOTE - CHIEF COMPLAINT QUOTE
muscles spasms, cough and chills x the passed 2 days, pt is currently 23 weeks pregnant and was seen at urgent care prior to arrival and found to be tachy to 150s resulting in pt to be sent to ED for further eval. HR in triage 114, afebrile.

## 2020-04-07 NOTE — ED PROVIDER NOTE - CLINICAL SUMMARY MEDICAL DECISION MAKING FREE TEXT BOX
AV: 30 y/o F , 23 weeks pregnant presents with tachycardia, intermittent body aches, subjective fever, and chills. Likely viral like illness. Will check lab work, cbc, cmp, urine analysis, and check fetal heart rate.

## 2020-04-07 NOTE — CHART NOTE - NSCHARTNOTEFT_GEN_A_CORE
R2 Chart Note    Received phone call re: low risk clinic patient in ED. Patient is 29y at 22w4d with tachycardia, myalgias, and subjective fevers. No reported obstetric complaints.   Recommended FH check and workup as indicated for viral illness.   Please consult as needed.    d/w Dr. Antonio Leone PGY2

## 2020-04-07 NOTE — ED PROVIDER NOTE - PROGRESS NOTE DETAILS
AV: Discussed case with Dr Alvarez (OB attending), reviewed labs. Dr. Alvarez does not feel this presentation is consistent with HELLP syndrome, feels increased LFTs likely COVID and not OB related. Recommends GI consult. AV: GI second page. AV: spoke to GI fellow Dr. Mcgregor, will admit to hospital to be evaluated by GI in the morning.

## 2020-04-07 NOTE — ED PROVIDER NOTE - OBJECTIVE STATEMENT
AV: 28 y/o F , 23 weeks pregnant presents from urgent care with tachycardia c/o intermittent body aches, subjective fever, and chills x1 week. States has noticed decrease PO intake and dark urine as well. Denies burning with urination, vaginal bleeding, abd pain, cp, or sob. Of note, was admitted to hospital in february with cardiac workup, echo with normal results, and CTA to r/o PE. Has been feeling well since previous admission.

## 2020-04-07 NOTE — ED ADULT NURSE NOTE - OBJECTIVE STATEMENT
pt here for muscle cramps.  she went to [facility] and was sent to ER for high heart rate.  here her heart rate is 102 and she c/o muscle cramps, decreased po and "feeling hot"  denies vaginal bleed or discharge

## 2020-04-08 DIAGNOSIS — Z29.9 ENCOUNTER FOR PROPHYLACTIC MEASURES, UNSPECIFIED: ICD-10-CM

## 2020-04-08 DIAGNOSIS — O26.899 OTHER SPECIFIED PREGNANCY RELATED CONDITIONS, UNSPECIFIED TRIMESTER: ICD-10-CM

## 2020-04-08 DIAGNOSIS — K75.9 INFLAMMATORY LIVER DISEASE, UNSPECIFIED: ICD-10-CM

## 2020-04-08 DIAGNOSIS — E87.6 HYPOKALEMIA: ICD-10-CM

## 2020-04-08 DIAGNOSIS — U07.1 COVID-19: ICD-10-CM

## 2020-04-08 DIAGNOSIS — R74.0 NONSPECIFIC ELEVATION OF LEVELS OF TRANSAMINASE AND LACTIC ACID DEHYDROGENASE [LDH]: ICD-10-CM

## 2020-04-08 DIAGNOSIS — O26.892 OTHER SPECIFIED PREGNANCY RELATED CONDITIONS, SECOND TRIMESTER: ICD-10-CM

## 2020-04-08 DIAGNOSIS — Z3A.22 22 WEEKS GESTATION OF PREGNANCY: ICD-10-CM

## 2020-04-08 LAB
ALBUMIN SERPL ELPH-MCNC: 3.6 G/DL — SIGNIFICANT CHANGE UP (ref 3.3–5)
ALP SERPL-CCNC: 334 U/L — HIGH (ref 40–120)
ALT FLD-CCNC: 587 U/L — HIGH (ref 10–45)
ANION GAP SERPL CALC-SCNC: 16 MMOL/L — SIGNIFICANT CHANGE UP (ref 5–17)
APAP SERPL-MCNC: <15 UG/ML — SIGNIFICANT CHANGE UP (ref 10–30)
AST SERPL-CCNC: 597 U/L — HIGH (ref 10–40)
BASOPHILS # BLD AUTO: 0.02 K/UL — SIGNIFICANT CHANGE UP (ref 0–0.2)
BASOPHILS NFR BLD AUTO: 0.2 % — SIGNIFICANT CHANGE UP (ref 0–2)
BILIRUB SERPL-MCNC: 1 MG/DL — SIGNIFICANT CHANGE UP (ref 0.2–1.2)
BUN SERPL-MCNC: 5 MG/DL — LOW (ref 7–23)
CALCIUM SERPL-MCNC: 9.2 MG/DL — SIGNIFICANT CHANGE UP (ref 8.4–10.5)
CHLORIDE SERPL-SCNC: 102 MMOL/L — SIGNIFICANT CHANGE UP (ref 96–108)
CO2 SERPL-SCNC: 18 MMOL/L — LOW (ref 22–31)
CREAT SERPL-MCNC: 0.46 MG/DL — LOW (ref 0.5–1.3)
CULTURE RESULTS: SIGNIFICANT CHANGE UP
EOSINOPHIL # BLD AUTO: 0.01 K/UL — SIGNIFICANT CHANGE UP (ref 0–0.5)
EOSINOPHIL NFR BLD AUTO: 0.1 % — SIGNIFICANT CHANGE UP (ref 0–6)
GGT SERPL-CCNC: 189 U/L — HIGH (ref 8–40)
GLUCOSE SERPL-MCNC: 96 MG/DL — SIGNIFICANT CHANGE UP (ref 70–99)
HAV IGM SER-ACNC: SIGNIFICANT CHANGE UP
HBV CORE IGM SER-ACNC: SIGNIFICANT CHANGE UP
HBV SURFACE AG SER-ACNC: SIGNIFICANT CHANGE UP
HCT VFR BLD CALC: 33.7 % — LOW (ref 34.5–45)
HCV AB S/CO SERPL IA: 0.15 S/CO — SIGNIFICANT CHANGE UP (ref 0–0.99)
HCV AB SERPL-IMP: SIGNIFICANT CHANGE UP
HGB BLD-MCNC: 11.3 G/DL — LOW (ref 11.5–15.5)
IMM GRANULOCYTES NFR BLD AUTO: 1.3 % — SIGNIFICANT CHANGE UP (ref 0–1.5)
LYMPHOCYTES # BLD AUTO: 1.44 K/UL — SIGNIFICANT CHANGE UP (ref 1–3.3)
LYMPHOCYTES # BLD AUTO: 17.1 % — SIGNIFICANT CHANGE UP (ref 13–44)
MAGNESIUM SERPL-MCNC: 1.8 MG/DL — SIGNIFICANT CHANGE UP (ref 1.6–2.6)
MCHC RBC-ENTMCNC: 26.7 PG — LOW (ref 27–34)
MCHC RBC-ENTMCNC: 33.5 GM/DL — SIGNIFICANT CHANGE UP (ref 32–36)
MCV RBC AUTO: 79.7 FL — LOW (ref 80–100)
MONOCYTES # BLD AUTO: 0.6 K/UL — SIGNIFICANT CHANGE UP (ref 0–0.9)
MONOCYTES NFR BLD AUTO: 7.1 % — SIGNIFICANT CHANGE UP (ref 2–14)
NEUTROPHILS # BLD AUTO: 6.23 K/UL — SIGNIFICANT CHANGE UP (ref 1.8–7.4)
NEUTROPHILS NFR BLD AUTO: 74.2 % — SIGNIFICANT CHANGE UP (ref 43–77)
NRBC # BLD: 0 /100 WBCS — SIGNIFICANT CHANGE UP (ref 0–0)
PHOSPHATE SERPL-MCNC: 2.7 MG/DL — SIGNIFICANT CHANGE UP (ref 2.5–4.5)
PLATELET # BLD AUTO: 241 K/UL — SIGNIFICANT CHANGE UP (ref 150–400)
POTASSIUM SERPL-MCNC: 3.8 MMOL/L — SIGNIFICANT CHANGE UP (ref 3.5–5.3)
POTASSIUM SERPL-SCNC: 3.8 MMOL/L — SIGNIFICANT CHANGE UP (ref 3.5–5.3)
PROT SERPL-MCNC: 7.2 G/DL — SIGNIFICANT CHANGE UP (ref 6–8.3)
RBC # BLD: 4.23 M/UL — SIGNIFICANT CHANGE UP (ref 3.8–5.2)
RBC # FLD: 13.4 % — SIGNIFICANT CHANGE UP (ref 10.3–14.5)
SARS-COV-2 RNA SPEC QL NAA+PROBE: DETECTED
SODIUM SERPL-SCNC: 136 MMOL/L — SIGNIFICANT CHANGE UP (ref 135–145)
SPECIMEN SOURCE: SIGNIFICANT CHANGE UP
WBC # BLD: 8.41 K/UL — SIGNIFICANT CHANGE UP (ref 3.8–10.5)
WBC # FLD AUTO: 8.41 K/UL — SIGNIFICANT CHANGE UP (ref 3.8–10.5)

## 2020-04-08 PROCEDURE — 99221 1ST HOSP IP/OBS SF/LOW 40: CPT

## 2020-04-08 PROCEDURE — 99233 SBSQ HOSP IP/OBS HIGH 50: CPT

## 2020-04-08 PROCEDURE — 99231 SBSQ HOSP IP/OBS SF/LOW 25: CPT | Mod: 25

## 2020-04-08 PROCEDURE — 99222 1ST HOSP IP/OBS MODERATE 55: CPT

## 2020-04-08 RX ORDER — ACETAMINOPHEN 500 MG
975 TABLET ORAL ONCE
Refills: 0 | Status: COMPLETED | OUTPATIENT
Start: 2020-04-08 | End: 2020-04-08

## 2020-04-08 RX ORDER — SODIUM CHLORIDE 9 MG/ML
500 INJECTION, SOLUTION INTRAVENOUS
Refills: 0 | Status: COMPLETED | OUTPATIENT
Start: 2020-04-08 | End: 2020-04-08

## 2020-04-08 RX ORDER — ACETAMINOPHEN 500 MG
650 TABLET ORAL ONCE
Refills: 0 | Status: COMPLETED | OUTPATIENT
Start: 2020-04-08 | End: 2020-04-08

## 2020-04-08 RX ORDER — ONDANSETRON 8 MG/1
4 TABLET, FILM COATED ORAL EVERY 6 HOURS
Refills: 0 | Status: DISCONTINUED | OUTPATIENT
Start: 2020-04-08 | End: 2020-04-10

## 2020-04-08 RX ORDER — CEFTRIAXONE 500 MG/1
1000 INJECTION, POWDER, FOR SOLUTION INTRAMUSCULAR; INTRAVENOUS EVERY 24 HOURS
Refills: 0 | Status: DISCONTINUED | OUTPATIENT
Start: 2020-04-08 | End: 2020-04-10

## 2020-04-08 RX ADMIN — ONDANSETRON 4 MILLIGRAM(S): 8 TABLET, FILM COATED ORAL at 03:28

## 2020-04-08 RX ADMIN — CEFTRIAXONE 100 MILLIGRAM(S): 500 INJECTION, POWDER, FOR SOLUTION INTRAMUSCULAR; INTRAVENOUS at 03:29

## 2020-04-08 RX ADMIN — Medication 650 MILLIGRAM(S): at 15:27

## 2020-04-08 RX ADMIN — Medication 975 MILLIGRAM(S): at 03:51

## 2020-04-08 RX ADMIN — ONDANSETRON 4 MILLIGRAM(S): 8 TABLET, FILM COATED ORAL at 23:54

## 2020-04-08 RX ADMIN — SODIUM CHLORIDE 100 MILLILITER(S): 9 INJECTION, SOLUTION INTRAVENOUS at 14:54

## 2020-04-08 NOTE — PROGRESS NOTE ADULT - ASSESSMENT
28 yo woman with sickle cell trait, hx of pyelonephritis,  currently 22 weeks pregnant presents from urgent care center in setting of tachycardia with 1 week symptoms of fever, chills, body aches and decreased PO intake found with transaminitis and COVID positive

## 2020-04-08 NOTE — CONSULT NOTE ADULT - ATTENDING COMMENTS
Hepatology Staff: Saray Lee MD    I have reviewed the EMR of the patient along with  Dr. Verdugo 04-08-20 @ 09:59    Patient Medical Record, hospital course was reviewed and summarized as below:    Vitals: Vital Signs Last 24 Hrs  T(C): 36.8 (08 Apr 2020 06:09), Max: 37.9 (08 Apr 2020 03:27)  T(F): 98.3 (08 Apr 2020 06:09), Max: 100.3 (08 Apr 2020 03:27)  HR: 94 (08 Apr 2020 06:09) (94 - 112)  BP: 97/63 (08 Apr 2020 06:09) (97/63 - 122/68)  BP(mean): 84 (07 Apr 2020 23:26) (84 - 84)  RR: 18 (08 Apr 2020 06:09) (16 - 20)  SpO2: 98% (08 Apr 2020 06:09) (98% - 100%)    Antibiotics:cefTRIAXone   IVPB 1000 milliGRAM(s) IV Intermittent every 24 hours      Labs:Creatinine, Serum: 0.46 mg/dL (04-08-20 @ 05:05)  Bilirubin Total, Serum: 1.0 mg/dL (04-08-20 @ 05:05)  INR: 1.17 ratio (04-07-20 @ 20:36)  Creatinine, Serum: 0.40 mg/dL (04-07-20 @ 19:13)  Bilirubin Total, Serum: 0.7 mg/dL (04-07-20 @ 19:13)    I/O: I&O's Summary    Nutritional Status: Weight (kg): 87.9 (04-08-20 @ 06:09)  BMI (kg/m2): 36 (04-08-20 @ 06:09)  Albumin, Serum: 3.6 g/dL (04-08-20 @ 05:05)  Albumin, Serum: 3.7 g/dL (04-07-20 @ 19:13)    Recommendations: Patient with hx of sickle cell trait, currently pregnant ( 22 weeks). Admitted with fever, cough, myalgias,  mild SOB- noted to have significant transaminitis, and COVID-19 + (on repeat testing). Trend of LFTs are overall stable in the last 24 hrs.    Etiology of transaminitis- likely related to COVID-19. D/D includes- pregnancy related liver diseases. Will recommend checking bile acid level. US of the abdomen with doppler study. R/o other viral hepatitis ( Hepatitis A, Hepatitis B, and Hepatitis C). Check autoimmune markers ( CHASTITY, SMA, and AMA).    Noted UTI-already on Rx.    Close monitoring and supportive care for now.      Plan discussed with Primary team.

## 2020-04-08 NOTE — H&P ADULT - PROBLEM SELECTOR PLAN 3
UA with a few WBC  Hx of pyelonephritis  Per OB/GYN recs empirically treat. Will continue with Ceftriaxone

## 2020-04-08 NOTE — H&P ADULT - PROBLEM SELECTOR PLAN 2
ED concerned that transaminitis was due to underlying GI issue  May be related to COVID infection  Alk phos elevated (common in pregancy per OB/GYN Dr. Tariq)  Acetaminophen levels low  -Judicious use of Tylenol  -Check GGT and Abd US  -GI consulted by ED- F/U recs in AM ED concerned that transaminitis was due to underlying GI issue. Labs not consisted with HELLP syndrome  May be related to COVID infection  Alk phos elevated (common in pregancy per OB/GYN Dr. Tariq)  Acetaminophen levels low  -Judicious use of Tylenol  -Check GGT and Abd US  -GI consulted by ED- F/U recs in AM

## 2020-04-08 NOTE — PROGRESS NOTE ADULT - SUBJECTIVE AND OBJECTIVE BOX
Division of Hospital Medicine Progress Note    Patient is a 29y old  Female who presents with a chief complaint of COVID-19 (2020 07:09)      SUBJECTIVE / OVERNIGHT EVENTS:  Patient was seen and examined at bedside  c/o nausea and vomiting   denies SOB, cough , chest pain.   Saturating at 95% on RA     ADDITIONAL REVIEW OF SYSTEMS: Negative, except as noted above    MEDICATIONS  (STANDING):  cefTRIAXone   IVPB 1000 milliGRAM(s) IV Intermittent every 24 hours  dextrose 5% + sodium chloride 0.9%. 500 milliLiter(s) (100 mL/Hr) IV Continuous <Continuous>    MEDICATIONS  (PRN):  guaiFENesin   Syrup  (Sugar-Free) 200 milliGRAM(s) Oral every 6 hours PRN Cough  ondansetron Injectable 4 milliGRAM(s) IV Push every 6 hours PRN Nausea      CAPILLARY BLOOD GLUCOSE        I&O's Summary      PHYSICAL EXAM:  Vital Signs Last 24 Hrs  T(C): 36.8 (2020 06:09), Max: 37.9 (2020 03:27)  T(F): 98.3 (2020 06:09), Max: 100.3 (2020 03:27)  HR: 94 (2020 06:09) (94 - 112)  BP: 97/63 (2020 06:09) (97/63 - 122/68)  BP(mean): 84 (2020 23:26) (84 - 84)  RR: 18 (2020 06:09) (16 - 20)  SpO2: 98% (2020 06:09) (98% - 100%)    CONSTITUTIONAL: NAD, well-developed  HEENT: MMM, anicteric  RESPIRATORY: Normal respiratory effort; lungs are clear to auscultation bilaterally  CARDIOVASCULAR: RRR  ABDOMEN: Nontender to palpation  PSYCH: affect appropriate  NEUROLOGY: AAOx3  SKIN: No rashes on exposed skin    LABS:                        11.3   8.41  )-----------( 241      ( 2020 05:05 )             33.7     04-08    136  |  102  |  5<L>  ----------------------------<  96  3.8   |  18<L>  |  0.46<L>    Ca    9.2      2020 05:05  Phos  2.7     04-08  Mg     1.8         TPro  7.2  /  Alb  3.6  /  TBili  1.0  /  DBili  x   /  AST  597<H>  /  ALT  587<H>  /  AlkPhos  334<H>      PT/INR - ( 2020 20:36 )   PT: 13.4 sec;   INR: 1.17 ratio         PTT - ( 2020 20:36 )  PTT:32.9 sec      Urinalysis Basic - ( 2020 19:55 )    Color: Yellow / Appearance: Slightly Turbid / S.016 / pH: x  Gluc: x / Ketone: Trace  / Bili: Small / Urobili: 6 mg/dL   Blood: x / Protein: Trace / Nitrite: Negative   Leuk Esterase: Negative / RBC: 3 /hpf / WBC 6 /HPF   Sq Epi: x / Non Sq Epi: 9 /hpf / Bacteria: Moderate        COVID-19 PCR: Detected (20 @ 00:55)  COVID-19 PCR: NotDetec (20 @ 21:46)      RADIOLOGY & ADDITIONAL TESTS:  Imaging from Last 24 Hours:    Electrocardiogram/QTc Interval:

## 2020-04-08 NOTE — H&P ADULT - NSHPPHYSICALEXAM_GEN_ALL_CORE
Portions of this History and Physical Exam are adopted from ER Provider Notation per [facility] policy to limit healthcare provider exposure during COVID19 Pandemic    Vital Signs Last 24 Hrs  T(C): 37.4 (08 Apr 2020 01:24), Max: 37.7 (07 Apr 2020 23:26)  T(F): 99.3 (08 Apr 2020 01:24), Max: 99.8 (07 Apr 2020 23:26)  HR: 112 (08 Apr 2020 01:24) (102 - 112)  BP: 112/78 (08 Apr 2020 01:24) (112/78 - 122/68)  BP(mean): 84 (07 Apr 2020 23:26) (84 - 84)  RR: 18 (08 Apr 2020 01:24) (16 - 18)  SpO2: 98% (08 Apr 2020 01:24) (98% - 100%)    PER ED PROVIDER NOTE:   GEN: NAD, well appearing  HEENT: NCAT, MMM, Trachea midline, normal conjunctiva, perrl  CHEST/LUNGS: Non-tachypneic, CTAB, bilateral breath sounds  CARDIAC: no murmurs, no rubs, no gallops  ABDOMEN: Soft, NTND, No rebound/guarding  MSK: No edema, no gross deformity of extremities  SKIN: No rashes, no petechiae, no vesicles  NEURO: CN grossly intact, AAOx3  PSYCH: Normal mood and affect

## 2020-04-08 NOTE — ED ADULT NURSE REASSESSMENT NOTE - NS ED NURSE REASSESS COMMENT FT1
Pt is breathing unlabored on RA. Pt medicated as per MD order. Educated pt on plan of care. Safety and comfort maintained. Pt admitted to medicine, awaiting a bed.

## 2020-04-08 NOTE — H&P ADULT - ATTENDING COMMENTS
Patient was previously unknown to me. Patient was assigned to me by hospitalist in charge. My involvement in this case consisted only of the initial history, physical and management plan. Primary medicine day team to assume care in AM and thereafter. Case discussed in detail with overnight medicine NP/PA GEOVANNA Parsons [id number] Patient was previously unknown to me. Patient was assigned to me by hospitalist in charge. My involvement in this case consisted only of the initial history, physical and management plan. Primary medicine day team to assume care in AM and thereafter. Case discussed in detail with overnight medicine NP/PA GEOVANNA Parsons [id number]    For coding and billing  Admit Diagnoses  Novel Coronavirus (COVID 19) U07.1  Transaminitis R74.0  CPT Code  94159 Patient was previously unknown to me. Patient was assigned to me by hospitalist in charge. My involvement in this case consisted only of the initial history and management plan. Primary medicine day team to assume care in AM and thereafter. Case discussed in detail with overnight medicine NP/PA GEOVANNA Parsons [id number]    For coding and billing  Admit Diagnoses  Novel Coronavirus (COVID 19) U07.1  Transaminitis R74.0  CPT Code  51461

## 2020-04-08 NOTE — H&P ADULT - NSICDXNOFAMILYHX_GEN_ALL_CORE
Samples Given: Neutragena Sunscreen, and CeraVe sunscreen
Plan: Compound sent to [facility]
Detail Level: Simple
<-- Click to add NO pertinent Family History

## 2020-04-08 NOTE — PROGRESS NOTE ADULT - PROBLEM SELECTOR PLAN 1
COVID19 PCR repeat is positive  CXR not done as there was no hypoxia and no c/o of SOB or cough  Breathing comfortably on RA,  saturating at 95%   Supplemental O2 prn for goal SpO2 >92%   (If required escalate to NRB and avoid BiPAP/High Flow Nasal Cannula per   isolation precautions  antitussives prn  Holding off Plaquenil for now.  ID consulted , will f/u recs

## 2020-04-08 NOTE — CONSULT NOTE ADULT - ASSESSMENT
A/P  30yo  @ 22+5 weeks gestation pregnancy with fever, mild tachycardia, coronavirus, significant transaminitis  Pt doing well from respiratory status. Complains of some shortness of breath with exertion but has no hypoxia on RA.   Transaminitis present on admit labs, already improving on repeat lab draw this AM. No evidence of acetaminophen induced injury or hepatitis. Likely COVID related. Pregnancy causes would include preeclampsia, HELLP syndrome, or acute fatty liver of pregnancy, none of which typically present at this early gestational age, and the remainder of her clinical picture (no HTN, normal plt, normal Cr, normal glucose, otherwise asymptomatic) does not fit these diagnoses.    We typically recommend treating pregnant women with plaquenil if she would be a candidate as a non-pregnant patient. It is generally considered safe for use during pregnancy.     Our target O2 saturation in COVID+ pregnant patients is >94%, please titrate supplemental O2 to this level. Ms Pires is not currently requiring supplemental O2.     Ms. Pires currently has no obstetric complaints. We would perform fetal monitoring only after 24 weeks gestation, and only in case of hypoxia, intubation, or decreased fetal movement.     MD Renetta  M Fellow

## 2020-04-08 NOTE — CONSULT NOTE ADULT - SUBJECTIVE AND OBJECTIVE BOX
Patient is a 29y old Female, pregnant at 22 weeks 5 days who presents with a chief complaint of fever, cough, tachycardia    HPI:  30 yo woman  @ 22+5 WGA with sickle cell trait, hx of pyelonephritis, presents from urgent care center in setting of tachycardia with 1 week symptoms of fever, chills, body aches and decreased PO intake. Has been isolating at home and denies sick contacts. Has a cough with mild production of phelgm, some nasal congestion. Denies SOB, CP, palpitations. Has had some nausea through pregnancy, worsening recently with additional emesis on rare occasion. Has mild dysuria, without flank tenderness. Patient with last visit ot the hospital for SOB in 2020 had CTA done which was negative for PE or other pulmonary process.  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    OB History:  3 prior full term NSVDs, no complications    PAST MEDICAL & SURGICAL HISTORY:  Urinary tract infection, h/o pyelonephritis  PID (pelvic inflammatory disease)  S/P cholecystectomy    MEDICATIONS  (STANDING):  cefTRIAXone   IVPB 1000 milliGRAM(s) IV Intermittent every 24 hours  dextrose 5% + sodium chloride 0.9%. 500 milliLiter(s) (100 mL/Hr) IV Continuous <Continuous>  Beginning plaquenil today    Vital Signs Last 24 Hrs  T(C): 37.4 (2020 12:15), Max: 37.9 (2020 03:27)  T(F): 99.4 (2020 12:15), Max: 100.3 (2020 03:27)  HR: 99 (2020 12:15) (94 - 112)  BP: 108/74 (2020 12:15) (97/63 - 122/68)  BP(mean): 84 (2020 23:26) (84 - 84)  RR: 19 (2020 12:15) (16 - 20)  SpO2: 98% (2020 12:15) (98% - 100%)    PHYSICAL EXAM:  Constitutional:  NAD  Respiratory:  No resp distress  Cardiovascular:  RRR  Gastrointestinal:  abd soft, NT, gravid  Genitourinary:  not performed  Mild tap tenderness in R CVA  Extremities:  NT, nonedematous    LABORATORY:             11.3   8.41  )-----------( 241      ( 2020 05:05 )             33.7     04-08  136  |  102  |  5<L>  ----------------------------<  96  3.8   |  18<L>  |  0.46<L>    Ca    9.2      2020 05:05  Phos  2.7     04-08  Mg     1.8     08    TPro  7.2  /  Alb  3.6  /  TBili  1.0  /  DBili  x   /  AST  597<H>  /  ALT  587<H>  /  AlkPhos  334<H>    Down from 20 AST//624    PT/INR - ( 2020 20:36 )   PT: 13.4 sec;   INR: 1.17 ratio         PTT - ( 2020 20:36 )  PTT:32.9 sec  Urinalysis Basic - ( 2020 19:55 )    Color: Yellow / Appearance: Slightly Turbid / S.016 / pH: x  Gluc: x / Ketone: Trace  / Bili: Small / Urobili: 6 mg/dL   Blood: x / Protein: Trace / Nitrite: Negative   Leuk Esterase: Negative / RBC: 3 /hpf / WBC 6 /HPF   Sq Epi: x / Non Sq Epi: 9 /hpf / Bacteria: Moderate

## 2020-04-08 NOTE — H&P ADULT - NSHPREVIEWOFSYSTEMS_GEN_ALL_CORE
REVIEW OF SYSTEMS:  CONSTITUTIONAL: See HPI  EYES: No eye pain or discharge  ENMT: See HPI  RESPIRATORY: See HPI  CARDIOVASCULAR: See HPI  GASTROINTESTINAL: See HPI.  GENITOURINARY: See HPI  NEUROLOGICAL: No headaches, loss of strength, or numbness  SKIN: No rashes or lesions   MUSCULOSKELETAL: +Myalgias.  PSYCHIATRIC: No depression, anxiety, or difficulty sleeping  ALLERGY AND IMMUNOLOGIC: No reactions to medications

## 2020-04-08 NOTE — H&P ADULT - PROBLEM SELECTOR PLAN 1
- COVID19 PCR repeat is positive  - Patient currently not hypoxic and requiring CXR  - Supplemental O2 prn for goal SpO2 >92%   (If required escalate to NRB and avoid BiPAP/High Flow Nasal Cannula per   - monitor EKG for QTc prolongation  - isolation precautions  - antitussives prn - COVID19 PCR repeat is positive  - Patient currently not hypoxic and requiring CXR. Breathing comfortably on RA  - Supplemental O2 prn for goal SpO2 >92%   (If required escalate to NRB and avoid BiPAP/High Flow Nasal Cannula per   - monitor EKG for QTc prolongation  - isolation precautions  - antitussives prn

## 2020-04-08 NOTE — H&P ADULT - NSHPLABSRESULTS_GEN_ALL_CORE
Labs personally reviewed and noted in detail below: Notable for hypokalemia, transaminitis, elevated alk phos    No imaging performed in ED as patient deferred and concerned about repeat CXR when she had CXR and CT  ~1 month ago

## 2020-04-08 NOTE — H&P ADULT - ASSESSMENT
30 yo woman with sickle cell trait, hx of pyelonephritis,  currently 22 weeks pregnant presents from urgent care center in setting of tachycardia with 1 week symptoms of fever, chills, body aches and decreased PO intake found with transaminitis and COVID positive

## 2020-04-08 NOTE — PATIENT PROFILE ADULT - DISASTER - NSPRESCRALCFREQ_GEN_A_NUR
Chief complaint:   Chief Complaint   Patient presents with   • Constipation       Vitals:  Visit Vitals  Pulse 120   Temp 99.4 °F (37.4 °C) (Rectal)   Wt 4.9 kg       HISTORY OF PRESENT ILLNESS     HPI  This is a 5 week old female who presents with the followin. Constipation: She has been on Good Start Gentle Formula but they are not sure if it is low iron. They have been rubbing her tummy and using a thermometer to stimulate when she is uncomfortable. She has a bowel movement every day but it is hard.   2. Rash: x 1 week. She has on her neck and head. It does not seem to cause her to itch. They have just been washing her and using lotion.     Other significant problems:  There are no active problems to display for this patient.      PAST MEDICAL, FAMILY AND SOCIAL HISTORY     Medications:  No current outpatient prescriptions on file.     No current facility-administered medications for this visit.        Allergies:  ALLERGIES:  Not on File    Past Medical  History/Surgeries:  No past medical history on file.    No past surgical history on file.    Family History:  No family history on file.    Social History:  Social History   Substance Use Topics   • Smoking status: Not on file   • Smokeless tobacco: Not on file   • Alcohol use Not on file       REVIEW OF SYSTEMS     Review of Systems   Constitutional: Positive for irritability (with bowel movements). Negative for activity change and appetite change.   Gastrointestinal: Positive for constipation. Negative for abdominal distention, blood in stool, diarrhea and vomiting.   Skin: Positive for rash. Negative for color change.       PHYSICAL EXAM     Physical Exam   Constitutional: She appears well-developed and well-nourished. She has a strong cry. No distress.   HENT:   Head: Anterior fontanelle is flat.   Cardiovascular: Normal rate and regular rhythm.    No murmur heard.  Pulmonary/Chest: Effort normal and breath sounds normal. No respiratory distress.    Abdominal: Soft. Bowel sounds are normal. She exhibits no distension and no mass. There is no hepatosplenomegaly. There is no tenderness. There is no rebound and no guarding. No hernia.   Neurological: She is alert.   Skin: Skin is warm. Rash (acne noted about neck) noted. She is not diaphoretic.       ASSESSMENT/PLAN   This is a 5 week old female who presents with the followin. Constipation: Intermittent, likely due to formula as dad reports it tastes like meal.   -Switch to low iron formula    2. Infant Acne: New.   -Monitor    Never

## 2020-04-08 NOTE — CONSULT NOTE ADULT - ASSESSMENT
Impression:    28 y/o F w/ hx of sickle cell trait, prior episode of pyelonephritis, and currently 22 weeks pregnant who presented with fevers/chills, body aches, cough, and decreased PO intake and found to have positive COVID-19 PCR with abnormal liver enzymes likely due to COVID-19 infection.    # Abnormal liver enzymes: Likely due to COVID-19 infection leading to elevated AST/ALT and in the setting of pregnancy resulting in elevated Alk Phos (due to placental isoenzyme). Differential includes other viral hepatitides and DILI. Minimal concern for underlying liver disease given normal platelets, normal albumin, and mildly increased INR.  # Anemia: Likely represents normal anemia of pregnancy.   # 22 weeks pregnant  # Sickle cell trait    Recommendations:  - F/U abdominal U/S and GGT  - Check Hepatitis A IgM, Hep B Surface Ag, Hep B Surface Ab, Hep B Core IgM, Hep B Core Ab, Hepatitis C Ab, Hepatitis E IgM, Cytomegalovirus PCR, Thee Barr Virus PCR, Herpes Simples Virus 1&2 PCR, Varicella Zoster Virus PCR  - Check serum bile acids  - Rest of care per primary team    Bucky Verdugo MD  Gastroenterology Fellow  Pager number:   [phone] (Long range pager)  [phone] ([employer] pager)  Pageable via [place] at [facility] and [employer]. Select Tools --> On Call [employer]-ED --> Search for name  Also available on Microsoft Teams

## 2020-04-08 NOTE — CONSULT NOTE ADULT - SUBJECTIVE AND OBJECTIVE BOX
Chief Complaint:  Fever  Reason for consult: Abnormal liver enzymes    HPI:    30 y/o F w/ hx of sickle cell trait, prior episode of pyelonephritis, and currently 22 weeks pregnant who presented with fevers/chills, body aches, cough, and decreased PO intake and found to have positive COVID-19 PCR with abnormal liver enzymes; Hepatology consulted for further evaluation.    The patient notes that her urine has appeared darker, but denies yellowing of her skin or eyes, and itching of her skin. She endorses baseline nausea and non-bloody bilious emesis throughout her pregnancy. She takes a pre-tayler vitamin, but does not use any other medications.     Allergies:  No Known Allergies    Home Medications:    Prenatal vitamin    Hospital Medications:  cefTRIAXone   IVPB 1000 milliGRAM(s) IV Intermittent every 24 hours  dextrose 5% + sodium chloride 0.9%. 500 milliLiter(s) IV Continuous <Continuous>  guaiFENesin   Syrup  (Sugar-Free) 200 milliGRAM(s) Oral every 6 hours PRN  ondansetron Injectable 4 milliGRAM(s) IV Push every 6 hours PRN    PMHX/PSHX:  Urinary tract infection  PID (pelvic inflammatory disease)  No pertinent past medical history  S/P cholecystectomy    Family history:     Denies family history of colon cancer/polyps, stomach cancer/polyps, pancreatic cancer/masses, liver cancer/disease, ovarian cancer and endometrial cancer.    Social History:     Tob: Denies  EtOH: Denies  Illicit Drugs: Denies    ROS:     General:  No wt loss, + fevers, chills, night sweats, fatigue  Eyes:  Good vision, no reported pain  ENT:  No sore throat, pain, runny nose, dysphagia  CV:  No pain, palpitations, hypo/hypertension  Pulm:  No dyspnea, + cough, tachypnea, wheezing  GI:  + pain, + nausea, + vomiting, No diarrhea, No constipation, No weight loss, No fever, No pruritis, No rectal bleeding, No tarry stools, No dysphagia,  :  + pain, bleeding, incontinence, nocturia  Muscle:  No pain, weakness  Neuro:  No weakness, tingling, memory problems  Psych:  + fatigue, insomnia, mood problems, depression  Endocrine:  No polyuria, polydipsia, cold/heat intolerance  Heme:  No petechiae, ecchymosis, easy bruisability  Skin:  No rash, tattoos, scars, edema    PHYSICAL EXAM:     Patient not personally examined due to non-emergent nature of consult and due to pandemic conditions    Vital Signs:  Vital Signs Last 24 Hrs  T(C): 36.8 (2020 06:09), Max: 37.9 (2020 03:27)  T(F): 98.3 (2020 06:09), Max: 100.3 (2020 03:27)  HR: 94 (2020 06:09) (94 - 112)  BP: 97/63 (2020 06:09) (97/63 - 122/68)  BP(mean): 84 (2020 23:26) (84 - 84)  RR: 18 (2020 06:09) (16 - 20)  SpO2: 98% (2020 06:09) (98% - 100%)  Daily Height in cm: 156.21 (2020 06:09)    Daily Weight in k.9 (2020 06:09)    LABS:                        11.3   8.41  )-----------( 241      ( 2020 05:05 )             33.7     Mean Cell Volume: 79.7 fl (-20 @ 05:05)    -08    136  |  102  |  5<L>  ----------------------------<  96  3.8   |  18<L>  |  0.46<L>    Ca    9.2      2020 05:05  Phos  2.7       Mg     1.8         TPro  7.2  /  Alb  3.6  /  TBili  1.0  /  DBili  x   /  AST  597<H>  /  ALT  587<H>  /  AlkPhos  334<H>      LIVER FUNCTIONS - ( 2020 05:05 )  Alb: 3.6 g/dL / Pro: 7.2 g/dL / ALK PHOS: 334 U/L / ALT: 587 U/L / AST: 597 U/L / GGT: x           PT/INR - ( 2020 20:36 )   PT: 13.4 sec;   INR: 1.17 ratio         PTT - ( 2020 20:36 )  PTT:32.9 sec  Urinalysis Basic - ( 2020 19:55 )    Color: Yellow / Appearance: Slightly Turbid / S.016 / pH: x  Gluc: x / Ketone: Trace  / Bili: Small / Urobili: 6 mg/dL   Blood: x / Protein: Trace / Nitrite: Negative   Leuk Esterase: Negative / RBC: 3 /hpf / WBC 6 /HPF   Sq Epi: x / Non Sq Epi: 9 /hpf / Bacteria: Moderate               11.3   8.41  )-----------( 241      ( 2020 05:05 )             33.7                         11.3   7.38  )-----------( 237      ( 2020 19:13 )             33.6     Imaging:    Imaging reviewed

## 2020-04-08 NOTE — H&P ADULT - PROBLEM SELECTOR PLAN 5
DVT ppx  Fall risk protocol DVT ppx  Fall risk protocol  Careful review of medications safe to use during pregnancy.

## 2020-04-08 NOTE — PROGRESS NOTE ADULT - PROBLEM SELECTOR PLAN 3
UA with a few WBC and Hx of pyelonephritis  Per OB/GYN recs empirically treat. Will continue with Ceftriaxone

## 2020-04-08 NOTE — H&P ADULT - HISTORY OF PRESENT ILLNESS
30 yo woman with sickle cell trait, hx of pyelonephritis,  currently 22 weeks pregnant presents from urgent care center in setting of tachycardia with 1 week symptoms of fever, chills, body aches and decreased PO intake. Patient also states that she has been endorsing cough with mild production of phelgm. Endorses nasal congestion yet no sore throat. Denies SOB, CP, palpitations. Endorses baseline nausea and NBNB emesis as she has experienced through her pregnancy. In setting of decreased PO intake, she experience some abdominal discomfort from hunger. Denies diarrhea or constipation. Endorses mild dysuria. No flank tenderness. Urine has appeared darker. Patient with last visit ot the hospital for SOB in 2020 had CTA done which was negative for PE or other pulmonary process. Denies sick contacts. Children are well at home.  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 Patient has stayed indoors for the past month with no external contacts.

## 2020-04-09 PROCEDURE — ZZZZZ: CPT

## 2020-04-09 PROCEDURE — 99232 SBSQ HOSP IP/OBS MODERATE 35: CPT

## 2020-04-09 PROCEDURE — 99231 SBSQ HOSP IP/OBS SF/LOW 25: CPT

## 2020-04-09 RX ORDER — ACETAMINOPHEN 500 MG
650 TABLET ORAL ONCE
Refills: 0 | Status: COMPLETED | OUTPATIENT
Start: 2020-04-09 | End: 2020-04-09

## 2020-04-09 RX ORDER — ACETAMINOPHEN 500 MG
325 TABLET ORAL ONCE
Refills: 0 | Status: DISCONTINUED | OUTPATIENT
Start: 2020-04-09 | End: 2020-04-10

## 2020-04-09 RX ADMIN — ONDANSETRON 4 MILLIGRAM(S): 8 TABLET, FILM COATED ORAL at 09:59

## 2020-04-09 RX ADMIN — CEFTRIAXONE 100 MILLIGRAM(S): 500 INJECTION, POWDER, FOR SOLUTION INTRAMUSCULAR; INTRAVENOUS at 03:38

## 2020-04-09 RX ADMIN — Medication 650 MILLIGRAM(S): at 22:24

## 2020-04-09 NOTE — PROGRESS NOTE ADULT - SUBJECTIVE AND OBJECTIVE BOX
Chief Complaint: Fever  Reason for consult: Abnormal liver enzymes    Interval Events:   - No fevers reported overnight    Allergies:  No Known Allergies    Hospital Medications:  cefTRIAXone   IVPB 1000 milliGRAM(s) IV Intermittent every 24 hours  guaiFENesin   Syrup  (Sugar-Free) 200 milliGRAM(s) Oral every 6 hours PRN  ondansetron Injectable 4 milliGRAM(s) IV Push every 6 hours PRN    PMHX/PSHX:  Urinary tract infection  PID (pelvic inflammatory disease)  No pertinent past medical history  S/P cholecystectomy    General:  No wt loss, + fevers, chills, night sweats, fatigue  Pulm:  No dyspnea, + cough, tachypnea, wheezing  GI:  + pain, + nausea, + vomiting, No diarrhea, No constipation, No weight loss, No fever, No pruritis, No rectal bleeding, No tarry stools, No dysphagia,  :  + pain, bleeding, incontinence, nocturia    PHYSICAL EXAM:   Vital Signs:  Vital Signs Last 24 Hrs  T(C): 37.6 (2020 03:32), Max: 37.6 (2020 03:32)  T(F): 99.7 (2020 03:32), Max: 99.7 (2020 03:32)  HR: 108 (2020 03:32) (98 - 108)  BP: 97/64 (2020 03:32) (97/64 - 108/74)  BP(mean): --  RR: 20 (2020 03:32) (19 - 20)  SpO2: 98% (2020 03:32) (98% - 100%)    Patient not personally examined due to non-emergent nature of consult and due to pandemic conditions    LABS:                        11.3   8.41  )-----------( 241      ( 2020 05:05 )             33.7     04-08    136  |  102  |  5<L>  ----------------------------<  96  3.8   |  18<L>  |  0.46<L>    Ca    9.2      2020 05:05  Phos  2.7     04-08  Mg     1.8     04-08    TPro  7.2  /  Alb  3.6  /  TBili  1.0  /  DBili  x   /  AST  597<H>  /  ALT  587<H>  /  AlkPhos  334<H>  04-08    LIVER FUNCTIONS - ( 2020 07:47 )  Alb: x     / Pro: x     / ALK PHOS: x     / ALT: x     / AST: x     / GGT: 189 U/L       PT/INR - ( 2020 20:36 )   PT: 13.4 sec;   INR: 1.17 ratio         PTT - ( 2020 20:36 )  PTT:32.9 sec  Urinalysis Basic - ( 2020 19:55 )    Color: Yellow / Appearance: Slightly Turbid / S.016 / pH: x  Gluc: x / Ketone: Trace  / Bili: Small / Urobili: 6 mg/dL   Blood: x / Protein: Trace / Nitrite: Negative   Leuk Esterase: Negative / RBC: 3 /hpf / WBC 6 /HPF   Sq Epi: x / Non Sq Epi: 9 /hpf / Bacteria: Moderate    Imaging:    No new imaging

## 2020-04-09 NOTE — CHART NOTE - NSCHARTNOTEFT_GEN_A_CORE
MEDICINE NP    Notified by RN patient with temperature 100.8. Patient is alert, no c/o CP, SOB or abd pain.    VITAL SIGNS:  T(C): 38.2 (20 @ 21:08), Max: 38.2 (20 @ 21:08)  HR: 117 (20 @ 21:08) (101 - 118)  BP: 102/62 (20 @ 21:08) (97/64 - 108/69)  RR: 20 (20 @ 21:08) (20 - 20)  SpO2: 97% (20 @ 21:08) (97% - 100%)  Wt(kg): --      LABORATORY:                          11.3   8.41  )-----------( 241      ( 2020 05:05 )             33.7           136  |  102  |  5<L>  ----------------------------<  96  3.8   |  18<L>  |  0.46<L>    Ca    9.2      2020 05:05  Phos  2.7       Mg     1.8         TPro  7.2  /  Alb  3.6  /  TBili  1.0  /  DBili  x   /  AST  597<H>  /  ALT  587<H>  /  AlkPhos  334<H>            MICROBIOLOGY:     MEDICATIONS  (STANDING):  cefTRIAXone   IVPB 1000 milliGRAM(s) IV Intermittent every 24 hours        RADIOLOGY:          ASSESSMENT/PLAN:   28 yo woman with sickle cell trait, hx of pyelonephritis,  currently 22 weeks pregnant presents from urgent care center in setting of tachycardia with 1 week symptoms of fever, chills, body aches and decreased PO intake. Patient also states that she has been endorsing cough with mild production of phelgm. Endorses nasal congestion yet no sore throat. Denies SOB, CP, palpitations. Endorses baseline nausea and NBNB emesis as she has experienced through her pregnancy. In setting of decreased PO intake, she experience some abdominal discomfort from hunger. Denies diarrhea or constipation. Endorses mild dysuria. No flank tenderness. Urine has appeared darker. Patient with last visit ot the hospital for SOB in 2020 had CTA done which was negative for PE or other pulmonary process. Denies sick contacts. Children are well at home.  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 Patient has stayed indoors for the past month with no external contacts. (2020 02:29)        1) Fever most likely from COVID 19 infection:  -Called OB/GYN #[id number] regarding Tylenols safety for a pregnant woman with elevated LFT's and responded only Tylenol can be used for fever.  -tylenol and cooling measures prn for pyrexia  -c/w current Tx  -cont assess and monitor  -F/U primary team in AM

## 2020-04-09 NOTE — PROGRESS NOTE ADULT - SUBJECTIVE AND OBJECTIVE BOX
Pt contacted via telephone for today's encounter    Worsening nausea, increasing emesis today. Some cough but no shortness of breath today.    Vital Signs Last 24 Hrs  T(C): 37.8 (09 Apr 2020 12:03), Max: 37.8 (09 Apr 2020 12:03)  T(F): 100.1 (09 Apr 2020 12:03), Max: 100.1 (09 Apr 2020 12:03)  HR: 118 (09 Apr 2020 12:03) (98 - 118)  BP: 105/69 (09 Apr 2020 12:03) (97/64 - 108/69)  RR: 20 (09 Apr 2020 12:03) (19 - 20)  SpO2: 98% (09 Apr 2020 12:03) (98% - 100%)    PHYSICAL EXAM:  deferred, pt contacted via telephone      LABS:      Ca    9.2        08 Apr 2020 05:05      PT/INR - ( 07 Apr 2020 20:36 )   PT: 13.4 sec;   INR: 1.17 ratio         PTT - ( 07 Apr 2020 20:36 )  PTT:32.9 sec

## 2020-04-09 NOTE — PROGRESS NOTE ADULT - ASSESSMENT
A/P  30yo  @ 22+6 weeks gestation pregnancy with fever, mild tachycardia, coronavirus, significant transaminitis  Appreciate care from primary team. Will defer management of acute respiratory complaints. We would recommend using SaO2 target of >94% in pregnant patients with COVID. Thankfully she is currently not requiring supplemental O2.   Pt's primary issue right now is GI upset, which remains consistent with COVID infection. Infectious workup has not shown any evidence of other viral infection. Low concern for pregnancy related liver disease. Bile acids pending.  We typically recommend treating pregnant women with plaquenil if she would be a candidate as a non-pregnant patient. It is generally considered safe for use during pregnancy. We agree with holding off on treating given pt has no hypoxia.     Ms. Pires currently has no obstetric complaints. We would perform fetal monitoring only after 24 weeks gestation, and only in case of hypoxia, intubation, or decreased fetal movement.     For urgent issues please call in house obgyn antepartum resident - spectra [phone] or L&D lounge [phone]    MD OLE [place] Fellow

## 2020-04-09 NOTE — PROGRESS NOTE ADULT - ASSESSMENT
Impression:    28 y/o F w/ hx of sickle cell trait, prior episode of pyelonephritis, and currently 22 weeks pregnant who presented with fevers/chills, body aches, cough, and decreased PO intake and found to have positive COVID-19 PCR with abnormal liver enzymes likely due to COVID-19 infection.    # Abnormal liver enzymes: Likely due to COVID-19 infection leading to elevated AST/ALT and in the setting of pregnancy resulting in elevated Alk Phos (due to placental isoenzyme). Differential includes other viral hepatitides and DILI. Minimal concern for underlying liver disease given normal platelets, normal albumin, and mildly increased INR.  # Anemia: Likely represents normal anemia of pregnancy.   # 22 weeks pregnant  # Sickle cell trait    Recommendations:  - F/U abdominal U/S and GGT  - F/U Hep B Surface Ab, Hep B Core Ab, Hepatitis E IgM, Cytomegalovirus PCR, Thee Barr Virus PCR, Herpes Simples Virus 1&2 PCR, Varicella Zoster Virus PCR  - F/U serum bile acids  - Rest of care per primary team    Bucky Verdugo MD  Gastroenterology Fellow  Pager number:   [phone] (Long range pager)  [phone] ([employer] pager)  Pageable via [place] at [facility] and [employer]. [employer] --> On Call [employer]-ED --> Search for name  Also available on Microsoft Teams Impression:    28 y/o F w/ hx of sickle cell trait, prior episode of pyelonephritis, and currently 22 weeks pregnant who presented with fevers/chills, body aches, cough, and decreased PO intake and found to have positive COVID-19 PCR with abnormal liver enzymes likely due to COVID-19 infection.    # Abnormal liver enzymes: Likely due to COVID-19 infection leading to elevated AST/ALT and in the setting of pregnancy resulting in elevated Alk Phos (due to placental isoenzyme). Differential includes other viral hepatitides and DILI. Minimal concern for underlying liver disease given normal platelets, normal albumin, and mildly increased INR.  # Anemia: Likely represents normal anemia of pregnancy.   # 22 weeks pregnant  # Sickle cell trait    Recommendations:  - F/U AM CMP  - F/U abdominal U/S and GGT  - F/U Hep B Surface Ab, Hep B Core Ab, Hepatitis E IgM, Cytomegalovirus PCR, Thee Barr Virus PCR, Herpes Simples Virus 1&2 PCR, Varicella Zoster Virus PCR  - F/U serum bile acids  - Rest of care per primary team    Bucky Verdugo MD  Gastroenterology Fellow  Pager number:   [phone] (Long range pager)  [phone] ([employer] pager)  Pageable via [place] at [facility] and [employer]. Select Tools --> On Call [facility]-ED --> Search for name  Also available on Microsoft Teams

## 2020-04-09 NOTE — PROGRESS NOTE ADULT - SUBJECTIVE AND OBJECTIVE BOX
Division of Hospital Medicine Progress Note    Patient is a 29y old  Female who presents with a chief complaint of Fever (2020 06:17)      SUBJECTIVE / OVERNIGHT EVENTS:  No fever spikes or acute events overnight   Saturating at 98% on RA     ADDITIONAL REVIEW OF SYSTEMS: Negative, except as noted above    MEDICATIONS  (STANDING):  cefTRIAXone   IVPB 1000 milliGRAM(s) IV Intermittent every 24 hours    MEDICATIONS  (PRN):  guaiFENesin   Syrup  (Sugar-Free) 200 milliGRAM(s) Oral every 6 hours PRN Cough  ondansetron Injectable 4 milliGRAM(s) IV Push every 6 hours PRN Nausea      CAPILLARY BLOOD GLUCOSE        I&O's Summary      PHYSICAL EXAM:  Vital Signs Last 24 Hrs  T(C): 37.6 (2020 03:32), Max: 37.6 (2020 03:32)  T(F): 99.7 (2020 03:32), Max: 99.7 (2020 03:32)  HR: 108 (2020 03:32) (98 - 108)  BP: 97/64 (2020 03:32) (97/64 - 108/74)  BP(mean): --  RR: 20 (2020 03:32) (19 - 20)  SpO2: 98% (2020 03:32) (98% - 100%)    CONSTITUTIONAL: NAD, well-developed  HEENT: MMM, anicteric  RESPIRATORY: Normal respiratory effort; lungs are clear to auscultation bilaterally  CARDIOVASCULAR: RRR  ABDOMEN: Nontender to palpation  PSYCH: affect appropriate  NEUROLOGY: AAOx3  SKIN: No rashes on exposed skin    LABS:                        11.3   8.41  )-----------( 241      ( 2020 05:05 )             33.7     04-08    136  |  102  |  5<L>  ----------------------------<  96  3.8   |  18<L>  |  0.46<L>    Ca    9.2      2020 05:05  Phos  2.7     04-08  Mg     1.8     04-08    TPro  7.2  /  Alb  3.6  /  TBili  1.0  /  DBili  x   /  AST  597<H>  /  ALT  587<H>  /  AlkPhos  334<H>  04-08    PT/INR - ( 2020 20:36 )   PT: 13.4 sec;   INR: 1.17 ratio         PTT - ( 2020 20:36 )  PTT:32.9 sec      Urinalysis Basic - ( 2020 19:55 )    Color: Yellow / Appearance: Slightly Turbid / S.016 / pH: x  Gluc: x / Ketone: Trace  / Bili: Small / Urobili: 6 mg/dL   Blood: x / Protein: Trace / Nitrite: Negative   Leuk Esterase: Negative / RBC: 3 /hpf / WBC 6 /HPF   Sq Epi: x / Non Sq Epi: 9 /hpf / Bacteria: Moderate        Culture - Urine (collected 2020 00:18)  Source: .Urine Clean Catch (Midstream)  Final Report (2020 21:18):    <10,000 CFU/mL Normal Urogenital Milana      COVID-19 PCR: Detected (20 @ 00:55)  COVID-19 PCR: NotDetec (20 @ 21:46)      RADIOLOGY & ADDITIONAL TESTS:  Imaging from Last 24 Hours:    Electrocardiogram/QTc Interval:

## 2020-04-09 NOTE — PROGRESS NOTE ADULT - PROBLEM SELECTOR PLAN 1
COVID19 PCR repeat is positive  CXR not done as there was no hypoxia and no c/o of SOB or cough  Breathing comfortably on RA,  saturating at 98%   Supplemental O2 prn for goal SpO2 >92%   (If required escalate to NRB and avoid BiPAP/High Flow Nasal Cannula per   isolation precautions  antitussives prn  Holding off Plaquenil as patient is clinically doing well

## 2020-04-10 ENCOUNTER — TRANSCRIPTION ENCOUNTER (OUTPATIENT)
Age: 30
End: 2020-04-10

## 2020-04-10 VITALS
HEART RATE: 108 BPM | TEMPERATURE: 100 F | SYSTOLIC BLOOD PRESSURE: 113 MMHG | RESPIRATION RATE: 20 BRPM | OXYGEN SATURATION: 98 % | DIASTOLIC BLOOD PRESSURE: 76 MMHG

## 2020-04-10 LAB
ALBUMIN SERPL ELPH-MCNC: 3.5 G/DL — SIGNIFICANT CHANGE UP (ref 3.3–5)
ALP SERPL-CCNC: 412 U/L — HIGH (ref 40–120)
ALT FLD-CCNC: 463 U/L — HIGH (ref 10–45)
ANION GAP SERPL CALC-SCNC: 16 MMOL/L — SIGNIFICANT CHANGE UP (ref 5–17)
AST SERPL-CCNC: 388 U/L — HIGH (ref 10–40)
BILIRUB SERPL-MCNC: 3.5 MG/DL — HIGH (ref 0.2–1.2)
BUN SERPL-MCNC: 5 MG/DL — LOW (ref 7–23)
CALCIUM SERPL-MCNC: 9.6 MG/DL — SIGNIFICANT CHANGE UP (ref 8.4–10.5)
CHLORIDE SERPL-SCNC: 100 MMOL/L — SIGNIFICANT CHANGE UP (ref 96–108)
CO2 SERPL-SCNC: 19 MMOL/L — LOW (ref 22–31)
CREAT SERPL-MCNC: 0.39 MG/DL — LOW (ref 0.5–1.3)
GLUCOSE SERPL-MCNC: 88 MG/DL — SIGNIFICANT CHANGE UP (ref 70–99)
HCT VFR BLD CALC: 38.5 % — SIGNIFICANT CHANGE UP (ref 34.5–45)
HGB BLD-MCNC: 12.9 G/DL — SIGNIFICANT CHANGE UP (ref 11.5–15.5)
INR BLD: 1.17 RATIO — HIGH (ref 0.88–1.16)
MCHC RBC-ENTMCNC: 26.7 PG — LOW (ref 27–34)
MCHC RBC-ENTMCNC: 33.5 GM/DL — SIGNIFICANT CHANGE UP (ref 32–36)
MCV RBC AUTO: 79.7 FL — LOW (ref 80–100)
NRBC # BLD: 0 /100 WBCS — SIGNIFICANT CHANGE UP (ref 0–0)
PLATELET # BLD AUTO: 239 K/UL — SIGNIFICANT CHANGE UP (ref 150–400)
POTASSIUM SERPL-MCNC: 3.7 MMOL/L — SIGNIFICANT CHANGE UP (ref 3.5–5.3)
POTASSIUM SERPL-SCNC: 3.7 MMOL/L — SIGNIFICANT CHANGE UP (ref 3.5–5.3)
PROT SERPL-MCNC: 7.4 G/DL — SIGNIFICANT CHANGE UP (ref 6–8.3)
PROTHROM AB SERPL-ACNC: 13.5 SEC — HIGH (ref 10–12.9)
RBC # BLD: 4.83 M/UL — SIGNIFICANT CHANGE UP (ref 3.8–5.2)
RBC # FLD: 13.5 % — SIGNIFICANT CHANGE UP (ref 10.3–14.5)
SODIUM SERPL-SCNC: 135 MMOL/L — SIGNIFICANT CHANGE UP (ref 135–145)
WBC # BLD: 7.72 K/UL — SIGNIFICANT CHANGE UP (ref 3.8–10.5)
WBC # FLD AUTO: 7.72 K/UL — SIGNIFICANT CHANGE UP (ref 3.8–10.5)

## 2020-04-10 PROCEDURE — 80053 COMPREHEN METABOLIC PANEL: CPT

## 2020-04-10 PROCEDURE — 83615 LACTATE (LD) (LDH) ENZYME: CPT

## 2020-04-10 PROCEDURE — 84100 ASSAY OF PHOSPHORUS: CPT

## 2020-04-10 PROCEDURE — 93005 ELECTROCARDIOGRAM TRACING: CPT

## 2020-04-10 PROCEDURE — 99232 SBSQ HOSP IP/OBS MODERATE 35: CPT

## 2020-04-10 PROCEDURE — 85730 THROMBOPLASTIN TIME PARTIAL: CPT

## 2020-04-10 PROCEDURE — 83010 ASSAY OF HAPTOGLOBIN QUANT: CPT

## 2020-04-10 PROCEDURE — 85384 FIBRINOGEN ACTIVITY: CPT

## 2020-04-10 PROCEDURE — 87449 NOS EACH ORGANISM AG IA: CPT

## 2020-04-10 PROCEDURE — 83735 ASSAY OF MAGNESIUM: CPT

## 2020-04-10 PROCEDURE — 76815 OB US LIMITED FETUS(S): CPT

## 2020-04-10 PROCEDURE — 85027 COMPLETE CBC AUTOMATED: CPT

## 2020-04-10 PROCEDURE — 87635 SARS-COV-2 COVID-19 AMP PRB: CPT

## 2020-04-10 PROCEDURE — 99285 EMERGENCY DEPT VISIT HI MDM: CPT

## 2020-04-10 PROCEDURE — 80074 ACUTE HEPATITIS PANEL: CPT

## 2020-04-10 PROCEDURE — 99231 SBSQ HOSP IP/OBS SF/LOW 25: CPT | Mod: 25

## 2020-04-10 PROCEDURE — 87086 URINE CULTURE/COLONY COUNT: CPT

## 2020-04-10 PROCEDURE — 76700 US EXAM ABDOM COMPLETE: CPT

## 2020-04-10 PROCEDURE — 81001 URINALYSIS AUTO W/SCOPE: CPT

## 2020-04-10 PROCEDURE — 82977 ASSAY OF GGT: CPT

## 2020-04-10 PROCEDURE — 85610 PROTHROMBIN TIME: CPT

## 2020-04-10 PROCEDURE — 76700 US EXAM ABDOM COMPLETE: CPT | Mod: 26

## 2020-04-10 PROCEDURE — 80307 DRUG TEST PRSMV CHEM ANLYZR: CPT

## 2020-04-10 RX ORDER — ACETAMINOPHEN 500 MG
1 TABLET ORAL
Qty: 0 | Refills: 0 | DISCHARGE
Start: 2020-04-10

## 2020-04-10 RX ORDER — CEPHALEXIN 500 MG
1 CAPSULE ORAL
Qty: 20 | Refills: 0
Start: 2020-04-10 | End: 2020-04-14

## 2020-04-10 RX ADMIN — CEFTRIAXONE 100 MILLIGRAM(S): 500 INJECTION, POWDER, FOR SOLUTION INTRAMUSCULAR; INTRAVENOUS at 03:21

## 2020-04-10 NOTE — DISCHARGE NOTE PROVIDER - CARE PROVIDER_API CALL
Primary Care Provider,   Phone: (   )    -  Fax: (   )    -  Follow Up Time: Primary Care Provider,   Phone: (   )    -  Fax: (   )    -  Follow Up Time:     Hepatology clinic,   Hepatology clinic will contact you to schedule a telehealth visit and follow up.  Phone: [phone]  Fax: (   )    -  Follow Up Time:

## 2020-04-10 NOTE — PROGRESS NOTE ADULT - SUBJECTIVE AND OBJECTIVE BOX
Chief Complaint: Fever  Reason for consult: Abnormal liver enzymes    Interval Events:   - Patient febrile overnight    Allergies:  No Known Allergies    MEDICATIONS  (STANDING):  cefTRIAXone   IVPB 1000 milliGRAM(s) IV Intermittent every 24 hours    MEDICATIONS  (PRN):  acetaminophen   Tablet .. 325 milliGRAM(s) Oral once PRN Temp greater or equal to 38C (100.4F)  guaiFENesin   Syrup  (Sugar-Free) 200 milliGRAM(s) Oral every 6 hours PRN Cough  ondansetron Injectable 4 milliGRAM(s) IV Push every 6 hours PRN Nausea    PMHX/PSHX:  Urinary tract infection  PID (pelvic inflammatory disease)  No pertinent past medical history  S/P cholecystectomy    General:  No wt loss, + fevers, chills, night sweats, fatigue  Pulm:  No dyspnea, + cough, tachypnea, wheezing  GI:  No pain, No nausea, No vomiting, No diarrhea, No constipation, No weight loss, No fever, No pruritis, No rectal bleeding, No tarry stools, No dysphagia,  :  No pain, bleeding, incontinence, nocturia    PHYSICAL EXAM:   Vital Signs:  Vital Signs Last 24 Hrs  T(C): 36.5 (10 Apr 2020 03:24), Max: 38.2 (09 Apr 2020 21:08)  T(F): 97.7 (10 Apr 2020 03:24), Max: 100.8 (09 Apr 2020 21:08)  HR: 90 (10 Apr 2020 03:24) (90 - 118)  BP: 101/69 (10 Apr 2020 03:24) (101/69 - 105/69)  BP(mean): --  RR: 20 (10 Apr 2020 03:24) (20 - 20)  SpO2: 97% (10 Apr 2020 03:24) (97% - 98%)    Patient not personally examined due to non-emergent nature of consult and due to pandemic conditions    LABS:                        12.9   7.72  )-----------( 239      ( 10 Apr 2020 06:35 )             38.5           LIVER FUNCTIONS - ( 08 Apr 2020 07:47 )  Alb: x     / Pro: x     / ALK PHOS: x     / ALT: x     / AST: x     / GGT: 189 U/L       Imaging:    No new imaging

## 2020-04-10 NOTE — DISCHARGE NOTE NURSING/CASE MANAGEMENT/SOCIAL WORK - PATIENT PORTAL LINK FT
You can access the FollowMyHealth Patient Portal offered by [facility] by registering at the following website: [email or web address]. By joining [employer]’s FollowMyHealth portal, you will also be able to view your health information using other applications (apps) compatible with our system.

## 2020-04-10 NOTE — DISCHARGE NOTE PROVIDER - NSDCMRMEDTOKEN_GEN_ALL_CORE_FT
Prenatal 1 oral capsule: acetaminophen 325 mg oral tablet: 1 tab(s) orally once, As needed, Temp greater or equal to 38C (100.4F)  guaiFENesin 100 mg/5 mL oral liquid: 10 milliliter(s) orally every 6 hours, As needed, Cough  Prenatal 1 oral capsule:

## 2020-04-10 NOTE — CHART NOTE - NSCHARTNOTEFT_GEN_A_CORE
Patient cleared by OB for discharge home. Urine culture negative for infection 4/8 - treatment not necessary at this time. Patient to follow up for telehealth visit in 2 weeks - patient will get call from office with appointment.     SETH Wallace [id number]

## 2020-04-10 NOTE — PROGRESS NOTE ADULT - ATTENDING COMMENTS
Hepatology Staff: Saray Lee MD    I have reviewed the EMR of the patient along with  Dr. Verdugo 04-10-20 @ 09:17.    Patient Medical Record, hospital course was reviewed and summarized as below:    Vitals: Vital Signs Last 24 Hrs  T(C): 36.5 (10 Apr 2020 03:24), Max: 38.2 (09 Apr 2020 21:08)  T(F): 97.7 (10 Apr 2020 03:24), Max: 100.8 (09 Apr 2020 21:08)  HR: 90 (10 Apr 2020 03:24) (90 - 118)  BP: 101/69 (10 Apr 2020 03:24) (101/69 - 105/69)  RR: 20 (10 Apr 2020 03:24) (20 - 20)  SpO2: 97% (10 Apr 2020 03:24) (97% - 98%)    Labs:Creatinine, Serum: 0.39 mg/dL (04-10-20 @ 06:35)  Bilirubin Total, Serum: 3.5 mg/dL (04-10-20 @ 06:35)      I/O: I&O's Summary    Nutritional Status:   Albumin, Serum: 3.5 g/dL (04-10-20 @ 06:35)    Last 24 hour events: Spiked a fever overnight. Also noted rising Bilirubin, and ALP although transaminitis better.    Recommendations: Suspect elevated LFTs related to COVID-19. However obstructive biliary process should be ruled out in view of the rising bilirubin and ALP. Noted US is pending.  Check PT/INR.    Plan discussed with Primary team.
Hepatology Staff: Saray Lee MD    I have reviewed the EMR of the patient along with  Dr. Verdugo 04-09-20 @ 08:48.    Patient Medical Record, hospital course was reviewed and summarized as below:    Vitals: Vital Signs Last 24 Hrs  T(C): 37.6 (09 Apr 2020 03:32), Max: 37.6 (09 Apr 2020 03:32)  T(F): 99.7 (09 Apr 2020 03:32), Max: 99.7 (09 Apr 2020 03:32)  HR: 108 (09 Apr 2020 03:32) (98 - 108)  BP: 97/64 (09 Apr 2020 03:32) (97/64 - 108/74)  RR: 20 (09 Apr 2020 03:32) (19 - 20)  SpO2: 98% (09 Apr 2020 03:32) (98% - 100%)    Last 24 hour events: Comfortable in RA.    Recommendations: LFTs pending from this am. Overall stable last 2 days.  Acute viral panel is negative. Other w/u is pending. COVID-19 +. Elevated LFTs likely related to COVID-19.   Less likely pregnancy induced liver disease. Patient has no pruritus. Bole acid level is pending. Platelets are within normal range.  US of the abdomen-pending.  We will follow up.      Plan discussed with Primary team.

## 2020-04-10 NOTE — PROGRESS NOTE ADULT - SUBJECTIVE AND OBJECTIVE BOX
Pt seen and evaluated at bedside    Feeling better today, no nausea. Denies abd pain, no contractions, leakage of fluid, or vaginal bleeding. Still having fevers but denies shortness of breath.     Vital Signs Last 24 Hrs  T(C): 37.1 (10 Apr 2020 13:53), Max: 38.2 (09 Apr 2020 21:08)  T(F): 98.8 (10 Apr 2020 13:53), Max: 100.8 (09 Apr 2020 21:08)  HR: 106 (10 Apr 2020 13:53) (90 - 117)  BP: 97/63 (10 Apr 2020 13:53) (97/63 - 102/62)  RR: 20 (10 Apr 2020 13:53) (20 - 20)  SpO2: 95% (10 Apr 2020 13:53) (95% - 98%)    PHYSICAL EXAM:  NAD  no resp distress  abd soft NT gravid    fetal heart rate 165bpm on DoppTone    LABS:                        12.9   7.72  )-----------( 239      ( 10 Apr 2020 06:35 )             38.5     10 Apr 2020 06:35    135    |  100    |  5      ----------------------------<  88     3.7     |  19     |  0.39     Ca    9.6        10 Apr 2020 06:35    TPro  7.4    /  Alb  3.5    /  TBili  3.5    /  DBili  x      /  AST  388    /  ALT  463    /  AlkPhos  412    10 Apr 2020 06:35    PT/INR - ( 10 Apr 2020 11:12 )   PT: 13.5 sec;   INR: 1.17 ratio

## 2020-04-10 NOTE — DISCHARGE NOTE PROVIDER - HOSPITAL COURSE
28 y/o woman with sickle cell trait, hx of pyelonephritis,  currently 22 weeks pregnant presents from urgent care center in setting of tachycardia with 1 week symptoms of fever, chills, body aches and decreased PO intake found with transaminitis and COVID positive. CXR not done as there was no hypoxia and no c/o of SOB or cough, pt breathing comfortably on RA, saturating at 98%. Pt seen by hepatology for transaminitis: Alk phos elevated (common in pregnancy per OB/GYN Dr. Tariq). Pt also with dysuria during pregnancy: UA with a few WBC and Hx of pyelonephritis, per OB/GYN recommendations empirically treated. 28 y/o woman with sickle cell trait, hx of pyelonephritis,  currently 22 weeks pregnant presents from urgent care center in setting of tachycardia with 1 week symptoms of fever, chills, body aches and decreased PO intake found with transaminitis and COVID positive. CXR not done as there was no hypoxia and no c/o of SOB or cough, pt breathing comfortably on RA, saturating at 98%. Pt seen by hepatology for transaminitis: Alk phos elevated (common in pregnancy per OB/GYN Dr. Tariq) and abdominal US with normal findings. Pt also with dysuria during pregnancy: UA with a few WBC and Hx of pyelonephritis, per OB/GYN recommendations empirically treated. 30 y/o woman with sickle cell trait, hx of pyelonephritis,  currently 22 weeks pregnant presents from urgent care center in setting of tachycardia with 1 week symptoms of fever, chills, body aches and decreased PO intake found with transaminitis and COVID positive. CXR not done as there was no hypoxia and no c/o of SOB or cough, pt breathing comfortably on RA, saturating at 98%. Pt seen by hepatology for transaminitis: Alk phos elevated (common in pregnancy per OB/GYN Dr. Tariq) and abdominal US with normal findings. Hepatology team arranged for hepatology clinic telehealth follow up, the clinic will contact the patient. Pt also with dysuria during pregnancy: UA with a few WBC and Hx of pyelonephritis, per OB/GYN recommendations empirically treated. UCx came back negative and no additional treatment needed at this time.     Pt cleared for DC by Dr. Taylor with follow up with PCP, OB/GYN and hepatology. 28 y/o woman with sickle cell trait, hx of pyelonephritis,  currently 22 weeks pregnant presents from urgent care center in setting of tachycardia with 1 week symptoms of fever, chills, body aches and decreased PO intake found with transaminitis and COVID positive. CXR not done as there was no hypoxia and no c/o of SOB or cough, pt breathing comfortably on RA, saturating at 98%. Pt seen by hepatology for transaminitis: Alk phos elevated (common in pregnancy per OB/GYN Dr. Tariq) and abdominal US with normal findings. Hepatology team arranged for hepatology clinic telehealth follow up, the clinic will contact the patient. Pt also with dysuria during pregnancy: UA with a few WBC and Hx of pyelonephritis, per OB/GYN recommendations empirically treated. UCx came back negative and no additional treatment needed at this time.     Pt cleared for DC by Dr. Taylor with follow up with PCP, OB/GYN and hepatology and to self-isolate until .

## 2020-04-10 NOTE — PROGRESS NOTE ADULT - ASSESSMENT
Impression:    30 y/o F w/ hx of sickle cell trait, prior episode of pyelonephritis, and currently 22 weeks pregnant who presented with fevers/chills, body aches, cough, and decreased PO intake and found to have positive COVID-19 PCR with abnormal liver enzymes likely due to COVID-19 infection.    # Abnormal liver enzymes: Likely due to COVID-19 infection leading to elevated AST/ALT and in the setting of pregnancy resulting in elevated Alk Phos (due to placental isoenzyme). Differential includes other viral hepatitides and DILI. Minimal concern for underlying liver disease given normal platelets, normal albumin, and mildly increased INR.  # Anemia: Likely represents normal anemia of pregnancy.   # 22 weeks pregnant  # Sickle cell trait    Recommendations:  - F/U AM CMP  - F/U abdominal U/S  - F/U Hep B Surface Ab, Hep B Core Ab, Hepatitis E IgM, Cytomegalovirus PCR, Thee Barr Virus PCR, Herpes Simples Virus 1&2 PCR, Varicella Zoster Virus PCR  - F/U serum bile acids  - Rest of care per primary team    Bucky Verdugo MD  Gastroenterology Fellow  Pager number:   [phone] (Long range pager)  [phone] ([facility] pager)  Pageable via [employer] at [facility] and [facility] Select Tools --> On Call LIN-Trig-ED --> Search for name  Also available on Microsoft Teams

## 2020-04-10 NOTE — PROGRESS NOTE ADULT - PROBLEM SELECTOR PROBLEM 2
Dysuria during pregnancy in second trimester
Dysuria during pregnancy in second trimester
Transaminitis

## 2020-04-10 NOTE — DISCHARGE NOTE PROVIDER - NSDCCPCAREPLAN_GEN_ALL_CORE_FT
PRINCIPAL DISCHARGE DIAGNOSIS  Diagnosis: COVID-19 virus infection  Assessment and Plan of Treatment: Currently 22 weeks pregnant found with transaminitis and COVID positive. CXR not done as there was no hypoxia and no c/o of SOB or cough, pt breathing comfortably on RA, saturating at 98%. Pt seen by hepatology for transaminitis: Alk phos elevated (common in pregnancy per OB/GYN Dr. Tariq) and abdominal US with normal findings. Hepatology team arranged for hepatology clinic telehealth follow up, the clinic will contact the patient. Pt also with dysuria during pregnancy: UA with a few WBC and Hx of pyelonephritis, per OB/GYN recommendations empirically treated. UCx came back negative and no additional treatment needed at this time.   OB/GYN contacted your OB clinic ([facility] at [address] [place] [address]) to reach out to the patient for follow up prenatal visits and telehealth visit.      SECONDARY DISCHARGE DIAGNOSES  Diagnosis: Pregnancy  Assessment and Plan of Treatment: Seen by OB/GYN in house. OB/GYN contacted your OB clinic ([facility] at [address] [place] [address]) to reach out to the patient for follow up prenatal visits and telehealth visit.    Diagnosis: Hypokalemia  Assessment and Plan of Treatment: Resolved.

## 2020-04-10 NOTE — PROGRESS NOTE ADULT - SUBJECTIVE AND OBJECTIVE BOX
Division of Hospital Medicine Progress Note    Patient is a 29y old  Female who presents with a chief complaint of Fevers/chills (10 Apr 2020 07:07)      SUBJECTIVE / OVERNIGHT EVENTS:  had a fever of 100.8 last night   saturating at 97% on RA     ADDITIONAL REVIEW OF SYSTEMS: Negative, except as noted above    MEDICATIONS  (STANDING):  cefTRIAXone   IVPB 1000 milliGRAM(s) IV Intermittent every 24 hours    MEDICATIONS  (PRN):  acetaminophen   Tablet .. 325 milliGRAM(s) Oral once PRN Temp greater or equal to 38C (100.4F)  guaiFENesin   Syrup  (Sugar-Free) 200 milliGRAM(s) Oral every 6 hours PRN Cough  ondansetron Injectable 4 milliGRAM(s) IV Push every 6 hours PRN Nausea      CAPILLARY BLOOD GLUCOSE        I&O's Summary      PHYSICAL EXAM:  Vital Signs Last 24 Hrs  T(C): 36.5 (10 Apr 2020 03:24), Max: 38.2 (09 Apr 2020 21:08)  T(F): 97.7 (10 Apr 2020 03:24), Max: 100.8 (09 Apr 2020 21:08)  HR: 90 (10 Apr 2020 03:24) (90 - 118)  BP: 101/69 (10 Apr 2020 03:24) (101/69 - 105/69)  BP(mean): --  RR: 20 (10 Apr 2020 03:24) (20 - 20)  SpO2: 97% (10 Apr 2020 03:24) (97% - 98%)    CONSTITUTIONAL: NAD, well-developed  HEENT: MMM, anicteric  RESPIRATORY: Normal respiratory effort; lungs are clear to auscultation bilaterally  CARDIOVASCULAR: RRR  ABDOMEN: Nontender to palpation  PSYCH: affect appropriate  NEUROLOGY: AAOx3  SKIN: No rashes on exposed skin    LABS:                        12.9   7.72  )-----------( 239      ( 10 Apr 2020 06:35 )             38.5     04-10    135  |  100  |  5<L>  ----------------------------<  88  3.7   |  19<L>  |  0.39<L>    Ca    9.6      10 Apr 2020 06:35    TPro  7.4  /  Alb  3.5  /  TBili  3.5<H>  /  DBili  x   /  AST  388<H>  /  ALT  463<H>  /  AlkPhos  412<H>  04-10              Culture - Urine (collected 08 Apr 2020 00:18)  Source: .Urine Clean Catch (Midstream)  Final Report (08 Apr 2020 21:18):    <10,000 CFU/mL Normal Urogenital Milana      COVID-19 PCR: Detected (04-08-20 @ 00:55)  COVID-19 PCR: NotDetec (04-07-20 @ 21:46)      RADIOLOGY & ADDITIONAL TESTS:  Imaging from Last 24 Hours:    Electrocardiogram/QTc Interval:

## 2020-04-10 NOTE — DISCHARGE NOTE PROVIDER - PROVIDER TOKENS
FREE:[LAST:[Primary Care Provider],PHONE:[(   )    -],FAX:[(   )    -]] FREE:[LAST:[Primary Care Provider],PHONE:[(   )    -],FAX:[(   )    -]],FREE:[LAST:[Hepatology clinic],PHONE:[phone]],FAX:[(   )    -],ADDRESS:[Hepatology clinic will contact you to schedule a telehealth visit and follow up.]]

## 2020-04-10 NOTE — DISCHARGE NOTE PROVIDER - NSFOLLOWUPCLINICS_GEN_ALL_ED_FT
[facility] Gynecology and Obstetrics  Gynceology/OB  [address] [place] [address]  Phone: [phone]  Fax:   Follow Up Time:

## 2020-04-10 NOTE — PROGRESS NOTE ADULT - ASSESSMENT
A/P  28yo  @ 23 weeks gestation pregnancy with fever, mild tachycardia, coronavirus, transaminitis  Appreciate care from primary team. Pt appears to be improving and continues to have no hypoxia. Pending Abd US read today, will likely be discharged home. We have contacted her OB clinic ([facility] at [address] [place] [address]) to reach out to the patient for follow up prenatal visits.  No further issues from an obstetric standpoint, and pt likely to be discharged soon. We will sign off at this point, feel free to contact us with any other concerns.    For urgent issues please call in house obgyn antepartum resident - spectra [phone] or L&D lounge [phone]    MD Renetta  MFM Fellow

## 2020-04-11 LAB — LEGIONELLA AG UR QL: NEGATIVE — SIGNIFICANT CHANGE UP

## 2020-04-14 DIAGNOSIS — Z71.89 OTHER SPECIFIED COUNSELING: ICD-10-CM

## 2020-04-17 ENCOUNTER — APPOINTMENT (OUTPATIENT)
Dept: HEPATOLOGY | Facility: CLINIC | Age: 30
End: 2020-04-17
Payer: MEDICAID

## 2020-04-17 PROCEDURE — 99215 OFFICE O/P EST HI 40 MIN: CPT | Mod: 95

## 2020-04-18 NOTE — HISTORY OF PRESENT ILLNESS
[FreeTextEntry1] : Called pt to check in, feeling well, no further symptoms, hasn't had fever since 4/7, sounds well.

## 2020-04-23 ENCOUNTER — LABORATORY RESULT (OUTPATIENT)
Age: 30
End: 2020-04-23

## 2020-04-23 LAB
ALBUMIN SERPL ELPH-MCNC: 3.8 G/DL — SIGNIFICANT CHANGE UP (ref 3.3–5)
ALP SERPL-CCNC: 171 U/L — HIGH (ref 40–120)
ALT FLD-CCNC: 96 U/L — HIGH (ref 10–45)
ANION GAP SERPL CALC-SCNC: 14 MMOL/L — SIGNIFICANT CHANGE UP (ref 5–17)
AST SERPL-CCNC: 48 U/L — HIGH (ref 10–40)
BILIRUB SERPL-MCNC: 0.7 MG/DL — SIGNIFICANT CHANGE UP (ref 0.2–1.2)
BUN SERPL-MCNC: 6 MG/DL — LOW (ref 7–23)
CALCIUM SERPL-MCNC: 9.9 MG/DL — SIGNIFICANT CHANGE UP (ref 8.4–10.5)
CHLORIDE SERPL-SCNC: 103 MMOL/L — SIGNIFICANT CHANGE UP (ref 96–108)
CO2 SERPL-SCNC: 19 MMOL/L — LOW (ref 22–31)
CREAT SERPL-MCNC: 0.41 MG/DL — LOW (ref 0.5–1.3)
GLUCOSE SERPL-MCNC: 101 MG/DL — HIGH (ref 70–99)
HCT VFR BLD CALC: 33.9 % — LOW (ref 34.5–45)
HGB BLD-MCNC: 10.6 G/DL — LOW (ref 11.5–15.5)
MCHC RBC-ENTMCNC: 26.6 PG — LOW (ref 27–34)
MCHC RBC-ENTMCNC: 31.3 GM/DL — LOW (ref 32–36)
MCV RBC AUTO: 85.2 FL — SIGNIFICANT CHANGE UP (ref 80–100)
PLATELET # BLD AUTO: 381 K/UL — SIGNIFICANT CHANGE UP (ref 150–400)
POTASSIUM SERPL-MCNC: 4.3 MMOL/L — SIGNIFICANT CHANGE UP (ref 3.5–5.3)
POTASSIUM SERPL-SCNC: 4.3 MMOL/L — SIGNIFICANT CHANGE UP (ref 3.5–5.3)
PROT SERPL-MCNC: 6.8 G/DL — SIGNIFICANT CHANGE UP (ref 6–8.3)
RBC # BLD: 3.98 M/UL — SIGNIFICANT CHANGE UP (ref 3.8–5.2)
RBC # FLD: 14.3 % — SIGNIFICANT CHANGE UP (ref 10.3–14.5)
SODIUM SERPL-SCNC: 137 MMOL/L — SIGNIFICANT CHANGE UP (ref 135–145)
WBC # BLD: 9.42 K/UL — SIGNIFICANT CHANGE UP (ref 3.8–10.5)
WBC # FLD AUTO: 9.42 K/UL — SIGNIFICANT CHANGE UP (ref 3.8–10.5)

## 2020-04-23 PROCEDURE — 36415 COLL VENOUS BLD VENIPUNCTURE: CPT

## 2020-04-23 PROCEDURE — 81002 URINALYSIS NONAUTO W/O SCOPE: CPT

## 2020-04-23 PROCEDURE — G0463: CPT

## 2020-04-23 PROCEDURE — 85610 PROTHROMBIN TIME: CPT

## 2020-04-23 PROCEDURE — 80053 COMPREHEN METABOLIC PANEL: CPT

## 2020-04-23 PROCEDURE — 85027 COMPLETE CBC AUTOMATED: CPT

## 2020-04-23 PROCEDURE — 87086 URINE CULTURE/COLONY COUNT: CPT

## 2020-04-24 ENCOUNTER — NON-APPOINTMENT (OUTPATIENT)
Age: 30
End: 2020-04-24

## 2020-04-24 ENCOUNTER — OUTPATIENT (OUTPATIENT)
Dept: OUTPATIENT SERVICES | Facility: HOSPITAL | Age: 30
LOS: 1 days | End: 2020-04-24
Payer: MEDICAID

## 2020-04-24 ENCOUNTER — APPOINTMENT (OUTPATIENT)
Dept: MATERNAL FETAL MEDICINE | Facility: CLINIC | Age: 30
End: 2020-04-24
Payer: MEDICAID

## 2020-04-24 DIAGNOSIS — O09.899 SUPERVISION OF OTHER HIGH RISK PREGNANCIES, UNSPECIFIED TRIMESTER: ICD-10-CM

## 2020-04-24 DIAGNOSIS — O09.90 SUPERVISION OF HIGH RISK PREGNANCY, UNSPECIFIED, UNSPECIFIED TRIMESTER: ICD-10-CM

## 2020-04-24 DIAGNOSIS — Z90.49 ACQUIRED ABSENCE OF OTHER SPECIFIED PARTS OF DIGESTIVE TRACT: Chronic | ICD-10-CM

## 2020-04-24 LAB
INR BLD: 1.02 RATIO — SIGNIFICANT CHANGE UP (ref 0.88–1.16)
PROTHROM AB SERPL-ACNC: 11.6 SEC — SIGNIFICANT CHANGE UP (ref 10–13.1)

## 2020-04-24 PROCEDURE — G0463: CPT

## 2020-04-24 PROCEDURE — 99213 OFFICE O/P EST LOW 20 MIN: CPT | Mod: GE,95

## 2020-04-29 ENCOUNTER — OUTPATIENT (OUTPATIENT)
Dept: OUTPATIENT SERVICES | Facility: HOSPITAL | Age: 30
LOS: 1 days | End: 2020-04-29
Payer: MEDICAID

## 2020-04-29 DIAGNOSIS — O26.899 OTHER SPECIFIED PREGNANCY RELATED CONDITIONS, UNSPECIFIED TRIMESTER: ICD-10-CM

## 2020-04-29 DIAGNOSIS — Z3A.00 WEEKS OF GESTATION OF PREGNANCY NOT SPECIFIED: ICD-10-CM

## 2020-04-29 DIAGNOSIS — Z90.49 ACQUIRED ABSENCE OF OTHER SPECIFIED PARTS OF DIGESTIVE TRACT: Chronic | ICD-10-CM

## 2020-04-29 PROCEDURE — 59025 FETAL NON-STRESS TEST: CPT

## 2020-04-29 PROCEDURE — G0463: CPT

## 2020-05-01 ENCOUNTER — NON-APPOINTMENT (OUTPATIENT)
Age: 30
End: 2020-05-01

## 2020-05-01 ENCOUNTER — APPOINTMENT (OUTPATIENT)
Dept: MATERNAL FETAL MEDICINE | Facility: CLINIC | Age: 30
End: 2020-05-01
Payer: MEDICAID

## 2020-05-01 ENCOUNTER — OUTPATIENT (OUTPATIENT)
Dept: OUTPATIENT SERVICES | Facility: HOSPITAL | Age: 30
LOS: 1 days | End: 2020-05-01
Payer: MEDICAID

## 2020-05-01 DIAGNOSIS — Z34.90 ENCOUNTER FOR SUPERVISION OF NORMAL PREGNANCY, UNSPECIFIED, UNSPECIFIED TRIMESTER: ICD-10-CM

## 2020-05-01 DIAGNOSIS — Z90.49 ACQUIRED ABSENCE OF OTHER SPECIFIED PARTS OF DIGESTIVE TRACT: Chronic | ICD-10-CM

## 2020-05-01 DIAGNOSIS — O09.899 SUPERVISION OF OTHER HIGH RISK PREGNANCIES, UNSPECIFIED TRIMESTER: ICD-10-CM

## 2020-05-01 PROCEDURE — 99213 OFFICE O/P EST LOW 20 MIN: CPT | Mod: GE,95

## 2020-05-01 PROCEDURE — G0463: CPT

## 2020-05-08 ENCOUNTER — ASOB RESULT (OUTPATIENT)
Age: 30
End: 2020-05-08

## 2020-05-08 ENCOUNTER — LABORATORY RESULT (OUTPATIENT)
Age: 30
End: 2020-05-08

## 2020-05-08 ENCOUNTER — APPOINTMENT (OUTPATIENT)
Dept: MATERNAL FETAL MEDICINE | Facility: CLINIC | Age: 30
End: 2020-05-08
Payer: MEDICAID

## 2020-05-08 ENCOUNTER — APPOINTMENT (OUTPATIENT)
Dept: ANTEPARTUM | Facility: CLINIC | Age: 30
End: 2020-05-08
Payer: MEDICAID

## 2020-05-08 ENCOUNTER — OUTPATIENT (OUTPATIENT)
Dept: OUTPATIENT SERVICES | Facility: HOSPITAL | Age: 30
LOS: 1 days | End: 2020-05-08
Payer: MEDICAID

## 2020-05-08 ENCOUNTER — NON-APPOINTMENT (OUTPATIENT)
Age: 30
End: 2020-05-08

## 2020-05-08 VITALS
DIASTOLIC BLOOD PRESSURE: 70 MMHG | BODY MASS INDEX: 36.5 KG/M2 | SYSTOLIC BLOOD PRESSURE: 108 MMHG | HEIGHT: 61.81 IN | WEIGHT: 198.38 LBS | OXYGEN SATURATION: 99 % | HEART RATE: 74 BPM

## 2020-05-08 DIAGNOSIS — O09.899 SUPERVISION OF OTHER HIGH RISK PREGNANCIES, UNSPECIFIED TRIMESTER: ICD-10-CM

## 2020-05-08 DIAGNOSIS — Z90.49 ACQUIRED ABSENCE OF OTHER SPECIFIED PARTS OF DIGESTIVE TRACT: Chronic | ICD-10-CM

## 2020-05-08 LAB
ALBUMIN SERPL ELPH-MCNC: 3.9 G/DL — SIGNIFICANT CHANGE UP (ref 3.3–5)
ALP SERPL-CCNC: 148 U/L — HIGH (ref 40–120)
ALT FLD-CCNC: 52 U/L — HIGH (ref 10–45)
ANION GAP SERPL CALC-SCNC: 14 MMOL/L — SIGNIFICANT CHANGE UP (ref 5–17)
AST SERPL-CCNC: 34 U/L — SIGNIFICANT CHANGE UP (ref 10–40)
BILIRUB SERPL-MCNC: 0.3 MG/DL — SIGNIFICANT CHANGE UP (ref 0.2–1.2)
BILIRUB UR QL STRIP: NORMAL
BUN SERPL-MCNC: 5 MG/DL — LOW (ref 7–23)
CALCIUM SERPL-MCNC: 9.4 MG/DL — SIGNIFICANT CHANGE UP (ref 8.4–10.5)
CHLORIDE SERPL-SCNC: 104 MMOL/L — SIGNIFICANT CHANGE UP (ref 96–108)
CO2 SERPL-SCNC: 19 MMOL/L — LOW (ref 22–31)
CREAT SERPL-MCNC: 0.47 MG/DL — LOW (ref 0.5–1.3)
GLUCOSE SERPL-MCNC: 144 MG/DL — HIGH (ref 70–99)
GLUCOSE UR-MCNC: NORMAL
HCG UR QL: 0.2 EU/DL
HCT VFR BLD CALC: 34 % — LOW (ref 34.5–45)
HGB BLD-MCNC: 10.6 G/DL — LOW (ref 11.5–15.5)
HGB UR QL STRIP.AUTO: NORMAL
KETONES UR-MCNC: NORMAL
LEUKOCYTE ESTERASE UR QL STRIP: NORMAL
MCHC RBC-ENTMCNC: 26.8 PG — LOW (ref 27–34)
MCHC RBC-ENTMCNC: 31.2 GM/DL — LOW (ref 32–36)
MCV RBC AUTO: 85.9 FL — SIGNIFICANT CHANGE UP (ref 80–100)
NITRITE UR QL STRIP: NORMAL
PH UR STRIP: 5.5
PLATELET # BLD AUTO: 266 K/UL — SIGNIFICANT CHANGE UP (ref 150–400)
POTASSIUM SERPL-MCNC: 3.8 MMOL/L — SIGNIFICANT CHANGE UP (ref 3.5–5.3)
POTASSIUM SERPL-SCNC: 3.8 MMOL/L — SIGNIFICANT CHANGE UP (ref 3.5–5.3)
PROT SERPL-MCNC: 6.9 G/DL — SIGNIFICANT CHANGE UP (ref 6–8.3)
PROT UR STRIP-MCNC: NORMAL
RBC # BLD: 3.96 M/UL — SIGNIFICANT CHANGE UP (ref 3.8–5.2)
RBC # FLD: 14.9 % — HIGH (ref 10.3–14.5)
SODIUM SERPL-SCNC: 137 MMOL/L — SIGNIFICANT CHANGE UP (ref 135–145)
SP GR UR STRIP: 1.02
T3FREE SERPL-MCNC: 2.42 PG/ML — SIGNIFICANT CHANGE UP (ref 1.8–4.6)
T4 FREE SERPL-MCNC: 0.8 NG/DL — LOW (ref 0.9–1.8)
TSH SERPL-MCNC: 0.98 UIU/ML — SIGNIFICANT CHANGE UP (ref 0.27–4.2)
WBC # BLD: 10.14 K/UL — SIGNIFICANT CHANGE UP (ref 3.8–10.5)
WBC # FLD AUTO: 10.14 K/UL — SIGNIFICANT CHANGE UP (ref 3.8–10.5)

## 2020-05-08 PROCEDURE — 36415 COLL VENOUS BLD VENIPUNCTURE: CPT | Mod: NC

## 2020-05-08 PROCEDURE — 99213 OFFICE O/P EST LOW 20 MIN: CPT | Mod: 25

## 2020-05-08 PROCEDURE — 76816 OB US FOLLOW-UP PER FETUS: CPT

## 2020-05-08 PROCEDURE — 76820 UMBILICAL ARTERY ECHO: CPT

## 2020-05-09 LAB — GLUCOSE 1H P MEAL SERPL-MCNC: 152 MG/DL — HIGH (ref 70–134)

## 2020-05-11 DIAGNOSIS — O99.019 ANEMIA COMPLICATING PREGNANCY, UNSPECIFIED TRIMESTER: ICD-10-CM

## 2020-05-11 RX ORDER — AMOXICILLIN AND CLAVULANATE POTASSIUM 875; 125 MG/1; MG/1
875-125 TABLET, COATED ORAL
Qty: 14 | Refills: 0 | Status: DISCONTINUED | COMMUNITY
Start: 2019-12-20 | End: 2020-05-11

## 2020-05-11 RX ORDER — CEPHALEXIN 500 MG/1
500 CAPSULE ORAL TWICE DAILY
Qty: 12 | Refills: 0 | Status: DISCONTINUED | COMMUNITY
Start: 2020-01-21 | End: 2020-05-11

## 2020-05-12 ENCOUNTER — NON-APPOINTMENT (OUTPATIENT)
Age: 30
End: 2020-05-12

## 2020-05-14 ENCOUNTER — LABORATORY RESULT (OUTPATIENT)
Age: 30
End: 2020-05-14

## 2020-05-14 PROCEDURE — 82950 GLUCOSE TEST: CPT

## 2020-05-14 PROCEDURE — 36415 COLL VENOUS BLD VENIPUNCTURE: CPT

## 2020-05-14 PROCEDURE — 81003 URINALYSIS AUTO W/O SCOPE: CPT

## 2020-05-14 PROCEDURE — 84443 ASSAY THYROID STIM HORMONE: CPT

## 2020-05-14 PROCEDURE — 86900 BLOOD TYPING SEROLOGIC ABO: CPT

## 2020-05-14 PROCEDURE — G0463: CPT

## 2020-05-14 PROCEDURE — 84439 ASSAY OF FREE THYROXINE: CPT

## 2020-05-14 PROCEDURE — 85027 COMPLETE CBC AUTOMATED: CPT

## 2020-05-14 PROCEDURE — 80053 COMPREHEN METABOLIC PANEL: CPT

## 2020-05-14 PROCEDURE — 84481 FREE ASSAY (FT-3): CPT

## 2020-05-14 PROCEDURE — 82951 GLUCOSE TOLERANCE TEST (GTT): CPT

## 2020-05-14 PROCEDURE — 86850 RBC ANTIBODY SCREEN: CPT

## 2020-05-15 ENCOUNTER — NON-APPOINTMENT (OUTPATIENT)
Age: 30
End: 2020-05-15

## 2020-05-15 LAB
GLUCOSE 1H P GLC SERPL-MCNC: 164 MG/DL — SIGNIFICANT CHANGE UP (ref 70–199)
GLUCOSE 2H P GLC SERPL-MCNC: 128 MG/DL — SIGNIFICANT CHANGE UP (ref 70–139)
GLUCOSE 3H P GLC SERPL-MCNC: 106 MG/DL — SIGNIFICANT CHANGE UP
GLUCOSE P FAST BLDV-MCNC: 89 MG/DL — SIGNIFICANT CHANGE UP (ref 70–99)

## 2020-05-16 DIAGNOSIS — U07.1 COVID-19: ICD-10-CM

## 2020-05-16 DIAGNOSIS — R06.02 SHORTNESS OF BREATH: ICD-10-CM

## 2020-05-16 DIAGNOSIS — O09.92 SUPERVISION OF HIGH RISK PREGNANCY, UNSPECIFIED, SECOND TRIMESTER: ICD-10-CM

## 2020-05-16 DIAGNOSIS — O99.019 ANEMIA COMPLICATING PREGNANCY, UNSPECIFIED TRIMESTER: ICD-10-CM

## 2020-05-19 ENCOUNTER — APPOINTMENT (OUTPATIENT)
Dept: ANTEPARTUM | Facility: CLINIC | Age: 30
End: 2020-05-19

## 2020-06-05 ENCOUNTER — APPOINTMENT (OUTPATIENT)
Dept: ANTEPARTUM | Facility: CLINIC | Age: 30
End: 2020-06-05
Payer: MEDICAID

## 2020-06-05 ENCOUNTER — APPOINTMENT (OUTPATIENT)
Dept: MATERNAL FETAL MEDICINE | Facility: CLINIC | Age: 30
End: 2020-06-05
Payer: MEDICAID

## 2020-06-05 ENCOUNTER — NON-APPOINTMENT (OUTPATIENT)
Age: 30
End: 2020-06-05

## 2020-06-05 ENCOUNTER — OUTPATIENT (OUTPATIENT)
Dept: OUTPATIENT SERVICES | Facility: HOSPITAL | Age: 30
LOS: 1 days | End: 2020-06-05
Payer: MEDICAID

## 2020-06-05 ENCOUNTER — ASOB RESULT (OUTPATIENT)
Age: 30
End: 2020-06-05

## 2020-06-05 VITALS
OXYGEN SATURATION: 98 % | SYSTOLIC BLOOD PRESSURE: 106 MMHG | HEIGHT: 61.81 IN | HEART RATE: 96 BPM | DIASTOLIC BLOOD PRESSURE: 70 MMHG | WEIGHT: 203 LBS | BODY MASS INDEX: 37.36 KG/M2

## 2020-06-05 DIAGNOSIS — Z90.49 ACQUIRED ABSENCE OF OTHER SPECIFIED PARTS OF DIGESTIVE TRACT: Chronic | ICD-10-CM

## 2020-06-05 DIAGNOSIS — O09.899 SUPERVISION OF OTHER HIGH RISK PREGNANCIES, UNSPECIFIED TRIMESTER: ICD-10-CM

## 2020-06-05 LAB
BILIRUB UR QL STRIP: NORMAL
GLUCOSE UR-MCNC: NORMAL
HCG UR QL: 0.2 EU/DL
HGB UR QL STRIP.AUTO: NORMAL
KETONES UR-MCNC: NORMAL
LEUKOCYTE ESTERASE UR QL STRIP: NORMAL
NITRITE UR QL STRIP: NORMAL
PH UR STRIP: 6
PROT UR STRIP-MCNC: NORMAL
SP GR UR STRIP: 1.02

## 2020-06-05 PROCEDURE — 76816 OB US FOLLOW-UP PER FETUS: CPT | Mod: 26

## 2020-06-05 PROCEDURE — 76816 OB US FOLLOW-UP PER FETUS: CPT

## 2020-06-05 PROCEDURE — 99213 OFFICE O/P EST LOW 20 MIN: CPT | Mod: GE

## 2020-06-05 PROCEDURE — G0463: CPT

## 2020-06-11 DIAGNOSIS — Z3A.31 31 WEEKS GESTATION OF PREGNANCY: ICD-10-CM

## 2020-06-11 DIAGNOSIS — O28.9 UNSPECIFIED ABNORMAL FINDINGS ON ANTENATAL SCREENING OF MOTHER: ICD-10-CM

## 2020-06-11 DIAGNOSIS — Z36.4 ENCOUNTER FOR ANTENATAL SCREENING FOR FETAL GROWTH RETARDATION: ICD-10-CM

## 2020-06-11 DIAGNOSIS — O99.213 OBESITY COMPLICATING PREGNANCY, THIRD TRIMESTER: ICD-10-CM

## 2020-06-11 DIAGNOSIS — O09.90 SUPERVISION OF HIGH RISK PREGNANCY, UNSPECIFIED, UNSPECIFIED TRIMESTER: ICD-10-CM

## 2020-06-11 DIAGNOSIS — U07.1 COVID-19: ICD-10-CM

## 2020-06-11 DIAGNOSIS — R79.89 OTHER SPECIFIED ABNORMAL FINDINGS OF BLOOD CHEMISTRY: ICD-10-CM

## 2020-06-11 DIAGNOSIS — O98.513 OTHER VIRAL DISEASES COMPLICATING PREGNANCY, THIRD TRIMESTER: ICD-10-CM

## 2020-06-16 ENCOUNTER — OUTPATIENT (OUTPATIENT)
Dept: OUTPATIENT SERVICES | Facility: HOSPITAL | Age: 30
LOS: 1 days | End: 2020-06-16
Payer: MEDICAID

## 2020-06-16 DIAGNOSIS — Z3A.00 WEEKS OF GESTATION OF PREGNANCY NOT SPECIFIED: ICD-10-CM

## 2020-06-16 DIAGNOSIS — Z90.49 ACQUIRED ABSENCE OF OTHER SPECIFIED PARTS OF DIGESTIVE TRACT: Chronic | ICD-10-CM

## 2020-06-16 DIAGNOSIS — O26.899 OTHER SPECIFIED PREGNANCY RELATED CONDITIONS, UNSPECIFIED TRIMESTER: ICD-10-CM

## 2020-06-16 PROCEDURE — G0463: CPT

## 2020-06-16 PROCEDURE — 59025 FETAL NON-STRESS TEST: CPT

## 2020-07-07 ENCOUNTER — LABORATORY RESULT (OUTPATIENT)
Age: 30
End: 2020-07-07

## 2020-07-07 ENCOUNTER — APPOINTMENT (OUTPATIENT)
Dept: MATERNAL FETAL MEDICINE | Facility: CLINIC | Age: 30
End: 2020-07-07
Payer: MEDICAID

## 2020-07-07 ENCOUNTER — OUTPATIENT (OUTPATIENT)
Dept: OUTPATIENT SERVICES | Facility: HOSPITAL | Age: 30
LOS: 1 days | End: 2020-07-07
Payer: MEDICAID

## 2020-07-07 ENCOUNTER — ASOB RESULT (OUTPATIENT)
Age: 30
End: 2020-07-07

## 2020-07-07 ENCOUNTER — NON-APPOINTMENT (OUTPATIENT)
Age: 30
End: 2020-07-07

## 2020-07-07 ENCOUNTER — APPOINTMENT (OUTPATIENT)
Dept: ANTEPARTUM | Facility: CLINIC | Age: 30
End: 2020-07-07
Payer: MEDICAID

## 2020-07-07 VITALS
BODY MASS INDEX: 37.73 KG/M2 | HEART RATE: 100 BPM | HEIGHT: 61.81 IN | OXYGEN SATURATION: 98 % | DIASTOLIC BLOOD PRESSURE: 78 MMHG | SYSTOLIC BLOOD PRESSURE: 100 MMHG | WEIGHT: 205 LBS

## 2020-07-07 DIAGNOSIS — O09.899 SUPERVISION OF OTHER HIGH RISK PREGNANCIES, UNSPECIFIED TRIMESTER: ICD-10-CM

## 2020-07-07 DIAGNOSIS — Z90.49 ACQUIRED ABSENCE OF OTHER SPECIFIED PARTS OF DIGESTIVE TRACT: Chronic | ICD-10-CM

## 2020-07-07 PROCEDURE — 87389 HIV-1 AG W/HIV-1&-2 AB AG IA: CPT

## 2020-07-07 PROCEDURE — 84439 ASSAY OF FREE THYROXINE: CPT

## 2020-07-07 PROCEDURE — 86780 TREPONEMA PALLIDUM: CPT

## 2020-07-07 PROCEDURE — 36415 COLL VENOUS BLD VENIPUNCTURE: CPT

## 2020-07-07 PROCEDURE — 80053 COMPREHEN METABOLIC PANEL: CPT

## 2020-07-07 PROCEDURE — 99213 OFFICE O/P EST LOW 20 MIN: CPT | Mod: GE,25

## 2020-07-07 PROCEDURE — 84481 FREE ASSAY (FT-3): CPT

## 2020-07-07 PROCEDURE — 87653 STREP B DNA AMP PROBE: CPT

## 2020-07-07 PROCEDURE — G0463: CPT

## 2020-07-07 PROCEDURE — 87086 URINE CULTURE/COLONY COUNT: CPT

## 2020-07-07 PROCEDURE — 76816 OB US FOLLOW-UP PER FETUS: CPT

## 2020-07-07 PROCEDURE — 81003 URINALYSIS AUTO W/O SCOPE: CPT

## 2020-07-07 PROCEDURE — 84443 ASSAY THYROID STIM HORMONE: CPT

## 2020-07-07 PROCEDURE — 76816 OB US FOLLOW-UP PER FETUS: CPT | Mod: 26

## 2020-07-08 ENCOUNTER — LABORATORY RESULT (OUTPATIENT)
Age: 30
End: 2020-07-08

## 2020-07-08 ENCOUNTER — NON-APPOINTMENT (OUTPATIENT)
Age: 30
End: 2020-07-08

## 2020-07-08 LAB
ALBUMIN SERPL ELPH-MCNC: 3.8 G/DL — SIGNIFICANT CHANGE UP (ref 3.3–5)
ALP SERPL-CCNC: 263 U/L — HIGH (ref 40–120)
ALT FLD-CCNC: 40 U/L — SIGNIFICANT CHANGE UP (ref 10–45)
ANION GAP SERPL CALC-SCNC: 15 MMOL/L — SIGNIFICANT CHANGE UP (ref 5–17)
AST SERPL-CCNC: 36 U/L — SIGNIFICANT CHANGE UP (ref 10–40)
BILIRUB SERPL-MCNC: 0.2 MG/DL — SIGNIFICANT CHANGE UP (ref 0.2–1.2)
BUN SERPL-MCNC: 8 MG/DL — SIGNIFICANT CHANGE UP (ref 7–23)
CALCIUM SERPL-MCNC: 9.8 MG/DL — SIGNIFICANT CHANGE UP (ref 8.4–10.5)
CHLORIDE SERPL-SCNC: 103 MMOL/L — SIGNIFICANT CHANGE UP (ref 96–108)
CO2 SERPL-SCNC: 20 MMOL/L — LOW (ref 22–31)
CREAT SERPL-MCNC: 0.53 MG/DL — SIGNIFICANT CHANGE UP (ref 0.5–1.3)
GLUCOSE SERPL-MCNC: 86 MG/DL — SIGNIFICANT CHANGE UP (ref 70–99)
HIV 1+2 AB+HIV1 P24 AG SERPL QL IA: SIGNIFICANT CHANGE UP
POTASSIUM SERPL-MCNC: 4.4 MMOL/L — SIGNIFICANT CHANGE UP (ref 3.5–5.3)
POTASSIUM SERPL-SCNC: 4.4 MMOL/L — SIGNIFICANT CHANGE UP (ref 3.5–5.3)
PROT SERPL-MCNC: 7.1 G/DL — SIGNIFICANT CHANGE UP (ref 6–8.3)
SODIUM SERPL-SCNC: 137 MMOL/L — SIGNIFICANT CHANGE UP (ref 135–145)
T PALLIDUM AB TITR SER: NEGATIVE — SIGNIFICANT CHANGE UP
T3FREE SERPL-MCNC: 2.74 PG/ML — SIGNIFICANT CHANGE UP (ref 1.8–4.6)
T4 FREE SERPL-MCNC: 0.9 NG/DL — SIGNIFICANT CHANGE UP (ref 0.9–1.8)
TSH SERPL-MCNC: 0.88 UIU/ML — SIGNIFICANT CHANGE UP (ref 0.27–4.2)

## 2020-07-09 LAB
BILIRUB UR QL STRIP: NORMAL
CULTURE RESULTS: SIGNIFICANT CHANGE UP
GLUCOSE UR-MCNC: NORMAL
GROUP B BETA STREP DNA (PCR): SIGNIFICANT CHANGE UP
GROUP B BETA STREP INTERPRETATION: SIGNIFICANT CHANGE UP
HCG UR QL: 0.2 EU/DL
HGB UR QL STRIP.AUTO: NORMAL
KETONES UR-MCNC: NORMAL
LEUKOCYTE ESTERASE UR QL STRIP: NORMAL
NITRITE UR QL STRIP: NORMAL
PH UR STRIP: 5.5
PROT UR STRIP-MCNC: NORMAL
SOURCE GROUP B STREP: SIGNIFICANT CHANGE UP
SP GR UR STRIP: 1.02
SPECIMEN SOURCE: SIGNIFICANT CHANGE UP

## 2020-07-10 DIAGNOSIS — O28.9 UNSPECIFIED ABNORMAL FINDINGS ON ANTENATAL SCREENING OF MOTHER: ICD-10-CM

## 2020-07-10 DIAGNOSIS — O98.513 OTHER VIRAL DISEASES COMPLICATING PREGNANCY, THIRD TRIMESTER: ICD-10-CM

## 2020-07-10 DIAGNOSIS — Z3A.35 35 WEEKS GESTATION OF PREGNANCY: ICD-10-CM

## 2020-07-10 DIAGNOSIS — Z36.4 ENCOUNTER FOR ANTENATAL SCREENING FOR FETAL GROWTH RETARDATION: ICD-10-CM

## 2020-07-14 ENCOUNTER — APPOINTMENT (OUTPATIENT)
Dept: MATERNAL FETAL MEDICINE | Facility: CLINIC | Age: 30
End: 2020-07-14

## 2020-07-17 ENCOUNTER — NON-APPOINTMENT (OUTPATIENT)
Age: 30
End: 2020-07-17

## 2020-07-17 ENCOUNTER — APPOINTMENT (OUTPATIENT)
Dept: MATERNAL FETAL MEDICINE | Facility: CLINIC | Age: 30
End: 2020-07-17
Payer: MEDICAID

## 2020-07-17 ENCOUNTER — OUTPATIENT (OUTPATIENT)
Dept: OUTPATIENT SERVICES | Facility: HOSPITAL | Age: 30
LOS: 1 days | End: 2020-07-17
Payer: MEDICAID

## 2020-07-17 VITALS
HEIGHT: 61.81 IN | SYSTOLIC BLOOD PRESSURE: 102 MMHG | DIASTOLIC BLOOD PRESSURE: 70 MMHG | BODY MASS INDEX: 38.18 KG/M2 | HEART RATE: 99 BPM | OXYGEN SATURATION: 99 % | WEIGHT: 207.5 LBS

## 2020-07-17 DIAGNOSIS — U07.1 COVID-19: ICD-10-CM

## 2020-07-17 DIAGNOSIS — O09.899 SUPERVISION OF OTHER HIGH RISK PREGNANCIES, UNSPECIFIED TRIMESTER: ICD-10-CM

## 2020-07-17 DIAGNOSIS — Z90.49 ACQUIRED ABSENCE OF OTHER SPECIFIED PARTS OF DIGESTIVE TRACT: Chronic | ICD-10-CM

## 2020-07-17 DIAGNOSIS — O09.90 SUPERVISION OF HIGH RISK PREGNANCY, UNSPECIFIED, UNSPECIFIED TRIMESTER: ICD-10-CM

## 2020-07-17 LAB
BILIRUB UR QL STRIP: NORMAL
GLUCOSE UR-MCNC: NORMAL
HCG UR QL: 0.2 EU/DL
HGB UR QL STRIP.AUTO: NORMAL
KETONES UR-MCNC: NORMAL
LEUKOCYTE ESTERASE UR QL STRIP: NORMAL
NITRITE UR QL STRIP: NORMAL
PH UR STRIP: 5.5
PROT UR STRIP-MCNC: NORMAL
SP GR UR STRIP: 1.02

## 2020-07-17 PROCEDURE — G0463: CPT

## 2020-07-17 PROCEDURE — 81003 URINALYSIS AUTO W/O SCOPE: CPT

## 2020-07-17 PROCEDURE — 99213 OFFICE O/P EST LOW 20 MIN: CPT | Mod: GE,25

## 2020-07-21 ENCOUNTER — APPOINTMENT (OUTPATIENT)
Dept: ANTEPARTUM | Facility: CLINIC | Age: 30
End: 2020-07-21
Payer: MEDICAID

## 2020-07-21 ENCOUNTER — ASOB RESULT (OUTPATIENT)
Age: 30
End: 2020-07-21

## 2020-07-21 ENCOUNTER — APPOINTMENT (OUTPATIENT)
Dept: MATERNAL FETAL MEDICINE | Facility: CLINIC | Age: 30
End: 2020-07-21
Payer: MEDICAID

## 2020-07-21 ENCOUNTER — NON-APPOINTMENT (OUTPATIENT)
Age: 30
End: 2020-07-21

## 2020-07-21 ENCOUNTER — OUTPATIENT (OUTPATIENT)
Dept: OUTPATIENT SERVICES | Facility: HOSPITAL | Age: 30
LOS: 1 days | End: 2020-07-21
Payer: MEDICAID

## 2020-07-21 VITALS
TEMPERATURE: 98.7 F | DIASTOLIC BLOOD PRESSURE: 78 MMHG | OXYGEN SATURATION: 98 % | HEART RATE: 98 BPM | BODY MASS INDEX: 37.91 KG/M2 | WEIGHT: 206 LBS | SYSTOLIC BLOOD PRESSURE: 106 MMHG | HEIGHT: 61.81 IN

## 2020-07-21 DIAGNOSIS — Z90.49 ACQUIRED ABSENCE OF OTHER SPECIFIED PARTS OF DIGESTIVE TRACT: Chronic | ICD-10-CM

## 2020-07-21 DIAGNOSIS — O09.899 SUPERVISION OF OTHER HIGH RISK PREGNANCIES, UNSPECIFIED TRIMESTER: ICD-10-CM

## 2020-07-21 PROCEDURE — 99213 OFFICE O/P EST LOW 20 MIN: CPT | Mod: GE,25

## 2020-07-21 PROCEDURE — 76818 FETAL BIOPHYS PROFILE W/NST: CPT | Mod: 26

## 2020-07-21 PROCEDURE — 81003 URINALYSIS AUTO W/O SCOPE: CPT

## 2020-07-21 PROCEDURE — G0463: CPT

## 2020-07-21 PROCEDURE — 76818 FETAL BIOPHYS PROFILE W/NST: CPT

## 2020-07-27 DIAGNOSIS — O09.90 SUPERVISION OF HIGH RISK PREGNANCY, UNSPECIFIED, UNSPECIFIED TRIMESTER: ICD-10-CM

## 2020-07-27 DIAGNOSIS — O99.019 ANEMIA COMPLICATING PREGNANCY, UNSPECIFIED TRIMESTER: ICD-10-CM

## 2020-07-27 DIAGNOSIS — O36.8131 DECREASED FETAL MOVEMENTS, THIRD TRIMESTER, FETUS 1: ICD-10-CM

## 2020-07-28 ENCOUNTER — APPOINTMENT (OUTPATIENT)
Dept: ANTEPARTUM | Facility: CLINIC | Age: 30
End: 2020-07-28
Payer: MEDICAID

## 2020-07-28 ENCOUNTER — ASOB RESULT (OUTPATIENT)
Age: 30
End: 2020-07-28

## 2020-07-28 ENCOUNTER — NON-APPOINTMENT (OUTPATIENT)
Age: 30
End: 2020-07-28

## 2020-07-28 ENCOUNTER — APPOINTMENT (OUTPATIENT)
Dept: MATERNAL FETAL MEDICINE | Facility: CLINIC | Age: 30
End: 2020-07-28
Payer: MEDICAID

## 2020-07-28 ENCOUNTER — OUTPATIENT (OUTPATIENT)
Dept: OUTPATIENT SERVICES | Facility: HOSPITAL | Age: 30
LOS: 1 days | End: 2020-07-28
Payer: MEDICAID

## 2020-07-28 ENCOUNTER — LABORATORY RESULT (OUTPATIENT)
Age: 30
End: 2020-07-28

## 2020-07-28 VITALS
BODY MASS INDEX: 37.91 KG/M2 | HEIGHT: 61.81 IN | DIASTOLIC BLOOD PRESSURE: 80 MMHG | HEART RATE: 90 BPM | TEMPERATURE: 98.5 F | OXYGEN SATURATION: 98 % | SYSTOLIC BLOOD PRESSURE: 118 MMHG | WEIGHT: 206 LBS

## 2020-07-28 DIAGNOSIS — Z90.49 ACQUIRED ABSENCE OF OTHER SPECIFIED PARTS OF DIGESTIVE TRACT: Chronic | ICD-10-CM

## 2020-07-28 DIAGNOSIS — O09.899 SUPERVISION OF OTHER HIGH RISK PREGNANCIES, UNSPECIFIED TRIMESTER: ICD-10-CM

## 2020-07-28 PROCEDURE — 99213 OFFICE O/P EST LOW 20 MIN: CPT | Mod: GE

## 2020-07-28 PROCEDURE — 76816 OB US FOLLOW-UP PER FETUS: CPT | Mod: 26

## 2020-07-28 PROCEDURE — G0463: CPT

## 2020-08-03 ENCOUNTER — OUTPATIENT (OUTPATIENT)
Dept: OUTPATIENT SERVICES | Facility: HOSPITAL | Age: 30
LOS: 1 days | End: 2020-08-03
Payer: MEDICAID

## 2020-08-03 DIAGNOSIS — O26.899 OTHER SPECIFIED PREGNANCY RELATED CONDITIONS, UNSPECIFIED TRIMESTER: ICD-10-CM

## 2020-08-03 DIAGNOSIS — Z3A.00 WEEKS OF GESTATION OF PREGNANCY NOT SPECIFIED: ICD-10-CM

## 2020-08-03 DIAGNOSIS — Z90.49 ACQUIRED ABSENCE OF OTHER SPECIFIED PARTS OF DIGESTIVE TRACT: Chronic | ICD-10-CM

## 2020-08-03 LAB
ALBUMIN SERPL ELPH-MCNC: 3.6 G/DL — SIGNIFICANT CHANGE UP (ref 3.3–5)
ALP SERPL-CCNC: 332 U/L — HIGH (ref 40–120)
ALT FLD-CCNC: 28 U/L — SIGNIFICANT CHANGE UP (ref 10–45)
ANION GAP SERPL CALC-SCNC: 17 MMOL/L — SIGNIFICANT CHANGE UP (ref 5–17)
APPEARANCE UR: ABNORMAL
APTT BLD: 27.2 SEC — LOW (ref 27.5–35.5)
AST SERPL-CCNC: 37 U/L — SIGNIFICANT CHANGE UP (ref 10–40)
BACTERIA # UR AUTO: ABNORMAL
BASOPHILS # BLD AUTO: 0.04 K/UL — SIGNIFICANT CHANGE UP (ref 0–0.2)
BASOPHILS NFR BLD AUTO: 0.4 % — SIGNIFICANT CHANGE UP (ref 0–2)
BILIRUB SERPL-MCNC: 0.2 MG/DL — SIGNIFICANT CHANGE UP (ref 0.2–1.2)
BILIRUB UR-MCNC: NEGATIVE — SIGNIFICANT CHANGE UP
BUN SERPL-MCNC: 13 MG/DL — SIGNIFICANT CHANGE UP (ref 7–23)
CALCIUM SERPL-MCNC: 9.7 MG/DL — SIGNIFICANT CHANGE UP (ref 8.4–10.5)
CHLORIDE SERPL-SCNC: 102 MMOL/L — SIGNIFICANT CHANGE UP (ref 96–108)
CO2 SERPL-SCNC: 16 MMOL/L — LOW (ref 22–31)
COD CRY URNS QL: ABNORMAL
COLOR SPEC: YELLOW — SIGNIFICANT CHANGE UP
CREAT ?TM UR-MCNC: 141 MG/DL — SIGNIFICANT CHANGE UP
CREAT SERPL-MCNC: 0.77 MG/DL — SIGNIFICANT CHANGE UP (ref 0.5–1.3)
DIFF PNL FLD: NEGATIVE — SIGNIFICANT CHANGE UP
EOSINOPHIL # BLD AUTO: 0.07 K/UL — SIGNIFICANT CHANGE UP (ref 0–0.5)
EOSINOPHIL NFR BLD AUTO: 0.7 % — SIGNIFICANT CHANGE UP (ref 0–6)
EPI CELLS # UR: 11 /HPF — HIGH
FIBRINOGEN PPP-MCNC: 660 MG/DL — HIGH (ref 350–510)
GLUCOSE SERPL-MCNC: 95 MG/DL — SIGNIFICANT CHANGE UP (ref 70–99)
GLUCOSE UR QL: NEGATIVE — SIGNIFICANT CHANGE UP
HCT VFR BLD CALC: 35.8 % — SIGNIFICANT CHANGE UP (ref 34.5–45)
HGB BLD-MCNC: 12 G/DL — SIGNIFICANT CHANGE UP (ref 11.5–15.5)
HYALINE CASTS # UR AUTO: 2 /LPF — SIGNIFICANT CHANGE UP (ref 0–2)
IMM GRANULOCYTES NFR BLD AUTO: 1.2 % — SIGNIFICANT CHANGE UP (ref 0–1.5)
INR BLD: 1.05 RATIO — SIGNIFICANT CHANGE UP (ref 0.88–1.16)
KETONES UR-MCNC: NEGATIVE — SIGNIFICANT CHANGE UP
LDH SERPL L TO P-CCNC: 149 U/L — SIGNIFICANT CHANGE UP (ref 50–242)
LEUKOCYTE ESTERASE UR-ACNC: ABNORMAL
LYMPHOCYTES # BLD AUTO: 1.81 K/UL — SIGNIFICANT CHANGE UP (ref 1–3.3)
LYMPHOCYTES # BLD AUTO: 18 % — SIGNIFICANT CHANGE UP (ref 13–44)
MCHC RBC-ENTMCNC: 26.4 PG — LOW (ref 27–34)
MCHC RBC-ENTMCNC: 33.5 GM/DL — SIGNIFICANT CHANGE UP (ref 32–36)
MCV RBC AUTO: 78.9 FL — LOW (ref 80–100)
MONOCYTES # BLD AUTO: 0.68 K/UL — SIGNIFICANT CHANGE UP (ref 0–0.9)
MONOCYTES NFR BLD AUTO: 6.8 % — SIGNIFICANT CHANGE UP (ref 2–14)
NEUTROPHILS # BLD AUTO: 7.35 K/UL — SIGNIFICANT CHANGE UP (ref 1.8–7.4)
NEUTROPHILS NFR BLD AUTO: 72.9 % — SIGNIFICANT CHANGE UP (ref 43–77)
NITRITE UR-MCNC: NEGATIVE — SIGNIFICANT CHANGE UP
NRBC # BLD: 0 /100 WBCS — SIGNIFICANT CHANGE UP (ref 0–0)
PH UR: 5.5 — SIGNIFICANT CHANGE UP (ref 5–8)
PLATELET # BLD AUTO: 244 K/UL — SIGNIFICANT CHANGE UP (ref 150–400)
POTASSIUM SERPL-MCNC: 3.8 MMOL/L — SIGNIFICANT CHANGE UP (ref 3.5–5.3)
POTASSIUM SERPL-SCNC: 3.8 MMOL/L — SIGNIFICANT CHANGE UP (ref 3.5–5.3)
PROT ?TM UR-MCNC: 9 MG/DL — SIGNIFICANT CHANGE UP (ref 0–12)
PROT SERPL-MCNC: 7 G/DL — SIGNIFICANT CHANGE UP (ref 6–8.3)
PROT UR-MCNC: ABNORMAL
PROT/CREAT UR-RTO: 0.1 RATIO — SIGNIFICANT CHANGE UP (ref 0–0.2)
PROTHROM AB SERPL-ACNC: 12.5 SEC — SIGNIFICANT CHANGE UP (ref 10.6–13.6)
RBC # BLD: 4.54 M/UL — SIGNIFICANT CHANGE UP (ref 3.8–5.2)
RBC # FLD: 13.9 % — SIGNIFICANT CHANGE UP (ref 10.3–14.5)
RBC CASTS # UR COMP ASSIST: 7 /HPF — HIGH (ref 0–4)
SODIUM SERPL-SCNC: 135 MMOL/L — SIGNIFICANT CHANGE UP (ref 135–145)
SP GR SPEC: 1.02 — SIGNIFICANT CHANGE UP (ref 1.01–1.02)
URATE SERPL-MCNC: 5.6 MG/DL — SIGNIFICANT CHANGE UP (ref 2.5–7)
UROBILINOGEN FLD QL: NEGATIVE — SIGNIFICANT CHANGE UP
WBC # BLD: 10.07 K/UL — SIGNIFICANT CHANGE UP (ref 3.8–10.5)
WBC # FLD AUTO: 10.07 K/UL — SIGNIFICANT CHANGE UP (ref 3.8–10.5)
WBC UR QL: 17 /HPF — HIGH (ref 0–5)

## 2020-08-03 PROCEDURE — 84550 ASSAY OF BLOOD/URIC ACID: CPT

## 2020-08-03 PROCEDURE — 85610 PROTHROMBIN TIME: CPT

## 2020-08-03 PROCEDURE — 85730 THROMBOPLASTIN TIME PARTIAL: CPT

## 2020-08-03 PROCEDURE — G0463: CPT

## 2020-08-03 PROCEDURE — 59025 FETAL NON-STRESS TEST: CPT

## 2020-08-03 PROCEDURE — 83615 LACTATE (LD) (LDH) ENZYME: CPT

## 2020-08-03 PROCEDURE — 84156 ASSAY OF PROTEIN URINE: CPT

## 2020-08-03 PROCEDURE — 81001 URINALYSIS AUTO W/SCOPE: CPT

## 2020-08-03 PROCEDURE — 85384 FIBRINOGEN ACTIVITY: CPT

## 2020-08-03 PROCEDURE — 85027 COMPLETE CBC AUTOMATED: CPT

## 2020-08-03 PROCEDURE — 80053 COMPREHEN METABOLIC PANEL: CPT

## 2020-08-03 PROCEDURE — 82570 ASSAY OF URINE CREATININE: CPT

## 2020-08-03 RX ORDER — FAMOTIDINE 10 MG/ML
20 INJECTION INTRAVENOUS DAILY
Refills: 0 | Status: DISCONTINUED | OUTPATIENT
Start: 2020-08-03 | End: 2020-08-18

## 2020-08-03 RX ADMIN — FAMOTIDINE 20 MILLIGRAM(S): 10 INJECTION INTRAVENOUS at 15:43

## 2020-08-04 ENCOUNTER — NON-APPOINTMENT (OUTPATIENT)
Age: 30
End: 2020-08-04

## 2020-08-04 ENCOUNTER — APPOINTMENT (OUTPATIENT)
Dept: MATERNAL FETAL MEDICINE | Facility: CLINIC | Age: 30
End: 2020-08-04
Payer: MEDICAID

## 2020-08-04 ENCOUNTER — OUTPATIENT (OUTPATIENT)
Dept: OUTPATIENT SERVICES | Facility: HOSPITAL | Age: 30
LOS: 1 days | End: 2020-08-04
Payer: MEDICAID

## 2020-08-04 ENCOUNTER — INPATIENT (INPATIENT)
Facility: HOSPITAL | Age: 30
LOS: 1 days | Discharge: HOME | End: 2020-08-06
Attending: OBSTETRICS & GYNECOLOGY | Admitting: OBSTETRICS & GYNECOLOGY
Payer: MEDICAID

## 2020-08-04 VITALS
BODY MASS INDEX: 37.93 KG/M2 | HEIGHT: 61.81 IN | OXYGEN SATURATION: 98 % | SYSTOLIC BLOOD PRESSURE: 120 MMHG | HEART RATE: 88 BPM | DIASTOLIC BLOOD PRESSURE: 88 MMHG | WEIGHT: 206.13 LBS

## 2020-08-04 VITALS — WEIGHT: 202.83 LBS | HEIGHT: 62.5 IN

## 2020-08-04 DIAGNOSIS — Z34.80 ENCOUNTER FOR SUPERVISION OF OTHER NORMAL PREGNANCY, UNSPECIFIED TRIMESTER: ICD-10-CM

## 2020-08-04 DIAGNOSIS — Z3A.00 WEEKS OF GESTATION OF PREGNANCY NOT SPECIFIED: ICD-10-CM

## 2020-08-04 DIAGNOSIS — O26.899 OTHER SPECIFIED PREGNANCY RELATED CONDITIONS, UNSPECIFIED TRIMESTER: ICD-10-CM

## 2020-08-04 DIAGNOSIS — Z90.49 ACQUIRED ABSENCE OF OTHER SPECIFIED PARTS OF DIGESTIVE TRACT: Chronic | ICD-10-CM

## 2020-08-04 DIAGNOSIS — O09.899 SUPERVISION OF OTHER HIGH RISK PREGNANCIES, UNSPECIFIED TRIMESTER: ICD-10-CM

## 2020-08-04 LAB
ALBUMIN SERPL ELPH-MCNC: 3.5 G/DL — SIGNIFICANT CHANGE UP (ref 3.3–5)
ALP SERPL-CCNC: 334 U/L — HIGH (ref 40–120)
ALT FLD-CCNC: 32 U/L — SIGNIFICANT CHANGE UP (ref 10–45)
ANION GAP SERPL CALC-SCNC: 17 MMOL/L — SIGNIFICANT CHANGE UP (ref 5–17)
APPEARANCE UR: ABNORMAL
APTT BLD: 25.5 SEC — LOW (ref 27.5–35.5)
AST SERPL-CCNC: 46 U/L — HIGH (ref 10–40)
BACTERIA # UR AUTO: NEGATIVE — SIGNIFICANT CHANGE UP
BASOPHILS # BLD AUTO: 0.04 K/UL — SIGNIFICANT CHANGE UP (ref 0–0.2)
BASOPHILS NFR BLD AUTO: 0.4 % — SIGNIFICANT CHANGE UP (ref 0–2)
BILIRUB SERPL-MCNC: 0.2 MG/DL — SIGNIFICANT CHANGE UP (ref 0.2–1.2)
BILIRUB UR QL STRIP: NORMAL
BILIRUB UR-MCNC: NEGATIVE — SIGNIFICANT CHANGE UP
BLD GP AB SCN SERPL QL: NEGATIVE — SIGNIFICANT CHANGE UP
BUN SERPL-MCNC: 9 MG/DL — SIGNIFICANT CHANGE UP (ref 7–23)
CALCIUM SERPL-MCNC: 9.7 MG/DL — SIGNIFICANT CHANGE UP (ref 8.4–10.5)
CHLORIDE SERPL-SCNC: 101 MMOL/L — SIGNIFICANT CHANGE UP (ref 96–108)
CO2 SERPL-SCNC: 17 MMOL/L — LOW (ref 22–31)
COLOR SPEC: YELLOW — SIGNIFICANT CHANGE UP
CREAT ?TM UR-MCNC: 43 MG/DL — SIGNIFICANT CHANGE UP
CREAT SERPL-MCNC: 0.48 MG/DL — LOW (ref 0.5–1.3)
DIFF PNL FLD: NEGATIVE — SIGNIFICANT CHANGE UP
EOSINOPHIL # BLD AUTO: 0.07 K/UL — SIGNIFICANT CHANGE UP (ref 0–0.5)
EOSINOPHIL NFR BLD AUTO: 0.7 % — SIGNIFICANT CHANGE UP (ref 0–6)
EPI CELLS # UR: 2 — SIGNIFICANT CHANGE UP
FIBRINOGEN PPP-MCNC: 671 MG/DL — HIGH (ref 350–510)
GLUCOSE SERPL-MCNC: 69 MG/DL — LOW (ref 70–99)
GLUCOSE UR QL: NEGATIVE — SIGNIFICANT CHANGE UP
GLUCOSE UR-MCNC: NORMAL
HCG UR QL: 0.2 EU/DL
HCT VFR BLD CALC: 36.7 % — SIGNIFICANT CHANGE UP (ref 34.5–45)
HGB BLD-MCNC: 12.3 G/DL — SIGNIFICANT CHANGE UP (ref 11.5–15.5)
HGB UR QL STRIP.AUTO: NORMAL
HYALINE CASTS # UR AUTO: 0 /LPF — SIGNIFICANT CHANGE UP (ref 0–7)
IMM GRANULOCYTES NFR BLD AUTO: 1 % — SIGNIFICANT CHANGE UP (ref 0–1.5)
INR BLD: 1.04 RATIO — SIGNIFICANT CHANGE UP (ref 0.88–1.16)
KETONES UR-MCNC: NEGATIVE — SIGNIFICANT CHANGE UP
KETONES UR-MCNC: NORMAL
LDH SERPL L TO P-CCNC: 167 U/L — SIGNIFICANT CHANGE UP (ref 50–242)
LEUKOCYTE ESTERASE UR QL STRIP: NORMAL
LEUKOCYTE ESTERASE UR-ACNC: ABNORMAL
LYMPHOCYTES # BLD AUTO: 19.9 % — SIGNIFICANT CHANGE UP (ref 13–44)
LYMPHOCYTES # BLD AUTO: 2.09 K/UL — SIGNIFICANT CHANGE UP (ref 1–3.3)
MCHC RBC-ENTMCNC: 26.5 PG — LOW (ref 27–34)
MCHC RBC-ENTMCNC: 33.5 GM/DL — SIGNIFICANT CHANGE UP (ref 32–36)
MCV RBC AUTO: 79.1 FL — LOW (ref 80–100)
MONOCYTES # BLD AUTO: 0.7 K/UL — SIGNIFICANT CHANGE UP (ref 0–0.9)
MONOCYTES NFR BLD AUTO: 6.7 % — SIGNIFICANT CHANGE UP (ref 2–14)
NEUTROPHILS # BLD AUTO: 7.48 K/UL — HIGH (ref 1.8–7.4)
NEUTROPHILS NFR BLD AUTO: 71.3 % — SIGNIFICANT CHANGE UP (ref 43–77)
NITRITE UR QL STRIP: NORMAL
NITRITE UR-MCNC: NEGATIVE — SIGNIFICANT CHANGE UP
NRBC # BLD: 0 /100 WBCS — SIGNIFICANT CHANGE UP (ref 0–0)
PH UR STRIP: 5
PH UR: 6 — SIGNIFICANT CHANGE UP (ref 5–8)
PLATELET # BLD AUTO: 239 K/UL — SIGNIFICANT CHANGE UP (ref 150–400)
POTASSIUM SERPL-MCNC: 4.1 MMOL/L — SIGNIFICANT CHANGE UP (ref 3.5–5.3)
POTASSIUM SERPL-SCNC: 4.1 MMOL/L — SIGNIFICANT CHANGE UP (ref 3.5–5.3)
PROT ?TM UR-MCNC: 10 MG/DL — SIGNIFICANT CHANGE UP (ref 0–12)
PROT SERPL-MCNC: 7.3 G/DL — SIGNIFICANT CHANGE UP (ref 6–8.3)
PROT UR STRIP-MCNC: NORMAL
PROT UR-MCNC: NEGATIVE — SIGNIFICANT CHANGE UP
PROT/CREAT UR-RTO: 0.2 RATIO — SIGNIFICANT CHANGE UP (ref 0–0.2)
PROTHROM AB SERPL-ACNC: 12.3 SEC — SIGNIFICANT CHANGE UP (ref 10.6–13.6)
RBC # BLD: 4.64 M/UL — SIGNIFICANT CHANGE UP (ref 3.8–5.2)
RBC # FLD: 13.8 % — SIGNIFICANT CHANGE UP (ref 10.3–14.5)
RBC CASTS # UR COMP ASSIST: 2 /HPF — SIGNIFICANT CHANGE UP (ref 0–4)
RH IG SCN BLD-IMP: POSITIVE — SIGNIFICANT CHANGE UP
SARS-COV-2 RNA SPEC QL NAA+PROBE: SIGNIFICANT CHANGE UP
SODIUM SERPL-SCNC: 135 MMOL/L — SIGNIFICANT CHANGE UP (ref 135–145)
SP GR SPEC: 1.01 — SIGNIFICANT CHANGE UP (ref 1.01–1.02)
SP GR UR STRIP: 1.02
T PALLIDUM AB TITR SER: NEGATIVE — SIGNIFICANT CHANGE UP
URATE SERPL-MCNC: 5.4 MG/DL — SIGNIFICANT CHANGE UP (ref 2.5–7)
UROBILINOGEN FLD QL: NEGATIVE — SIGNIFICANT CHANGE UP
WBC # BLD: 10.49 K/UL — SIGNIFICANT CHANGE UP (ref 3.8–10.5)
WBC # FLD AUTO: 10.49 K/UL — SIGNIFICANT CHANGE UP (ref 3.8–10.5)
WBC UR QL: 6 /HPF — HIGH (ref 0–5)

## 2020-08-04 PROCEDURE — 99213 OFFICE O/P EST LOW 20 MIN: CPT | Mod: 25,GE

## 2020-08-04 PROCEDURE — 81003 URINALYSIS AUTO W/O SCOPE: CPT

## 2020-08-04 PROCEDURE — G0463: CPT

## 2020-08-04 RX ORDER — CITRIC ACID/SODIUM CITRATE 300-500 MG
15 SOLUTION, ORAL ORAL EVERY 6 HOURS
Refills: 0 | Status: DISCONTINUED | OUTPATIENT
Start: 2020-08-04 | End: 2020-08-05

## 2020-08-04 RX ORDER — SODIUM CHLORIDE 9 MG/ML
1000 INJECTION, SOLUTION INTRAVENOUS
Refills: 0 | Status: DISCONTINUED | OUTPATIENT
Start: 2020-08-04 | End: 2020-08-05

## 2020-08-04 RX ORDER — OXYTOCIN 10 UNIT/ML
333.33 VIAL (ML) INJECTION
Qty: 20 | Refills: 0 | Status: DISCONTINUED | OUTPATIENT
Start: 2020-08-04 | End: 2020-08-06

## 2020-08-04 RX ORDER — ACETAMINOPHEN 500 MG
975 TABLET ORAL ONCE
Refills: 0 | Status: COMPLETED | OUTPATIENT
Start: 2020-08-04 | End: 2020-08-04

## 2020-08-04 RX ORDER — SODIUM CHLORIDE 9 MG/ML
1000 INJECTION, SOLUTION INTRAVENOUS
Refills: 0 | Status: DISCONTINUED | OUTPATIENT
Start: 2020-08-04 | End: 2020-08-06

## 2020-08-04 RX ORDER — FAMOTIDINE 10 MG/ML
20 INJECTION INTRAVENOUS DAILY
Refills: 0 | Status: DISCONTINUED | OUTPATIENT
Start: 2020-08-04 | End: 2020-08-06

## 2020-08-04 RX ORDER — FAMOTIDINE 10 MG/ML
20 INJECTION INTRAVENOUS ONCE
Refills: 0 | Status: COMPLETED | OUTPATIENT
Start: 2020-08-04 | End: 2020-08-04

## 2020-08-04 RX ADMIN — FAMOTIDINE 20 MILLIGRAM(S): 10 INJECTION INTRAVENOUS at 13:55

## 2020-08-04 RX ADMIN — SODIUM CHLORIDE 125 MILLILITER(S): 9 INJECTION, SOLUTION INTRAVENOUS at 16:17

## 2020-08-04 RX ADMIN — SODIUM CHLORIDE 125 MILLILITER(S): 9 INJECTION, SOLUTION INTRAVENOUS at 15:10

## 2020-08-04 RX ADMIN — Medication 975 MILLIGRAM(S): at 13:55

## 2020-08-04 NOTE — PATIENT PROFILE OB - CAREGIVER
88F w/ bloody stool x 2 day no blood thinner, mildly tachycardiac w/ soft BP at triage, on methotrexate no steroid for RA; hemoccult+ w/ gross blood, check labs, gentle hydration, type and screen, will be admitted, reassess 88F w/ bloody stool x 2 day no blood thinner, mildly tachycardiac w/ soft BP at triage, on methotrexate no steroid for RA; hemoccult+ w/ gross blood, check labs, gentle hydration, type and screen, orthostatics, r/o diverticulitis, consider angiodysplasia, will be admitted, reassess - ZR Yes

## 2020-08-05 LAB
SARS-COV-2 IGG SERPL QL IA: POSITIVE
SARS-COV-2 IGM SERPL IA-ACNC: 99.4 INDEX — HIGH

## 2020-08-05 RX ORDER — SIMETHICONE 80 MG/1
80 TABLET, CHEWABLE ORAL EVERY 4 HOURS
Refills: 0 | Status: DISCONTINUED | OUTPATIENT
Start: 2020-08-05 | End: 2020-08-06

## 2020-08-05 RX ORDER — DIPHENHYDRAMINE HCL 50 MG
25 CAPSULE ORAL EVERY 6 HOURS
Refills: 0 | Status: DISCONTINUED | OUTPATIENT
Start: 2020-08-05 | End: 2020-08-06

## 2020-08-05 RX ORDER — DIBUCAINE 1 %
1 OINTMENT (GRAM) RECTAL EVERY 6 HOURS
Refills: 0 | Status: DISCONTINUED | OUTPATIENT
Start: 2020-08-05 | End: 2020-08-06

## 2020-08-05 RX ORDER — IBUPROFEN 200 MG
600 TABLET ORAL EVERY 6 HOURS
Refills: 0 | Status: DISCONTINUED | OUTPATIENT
Start: 2020-08-05 | End: 2020-08-06

## 2020-08-05 RX ORDER — ACETAMINOPHEN 500 MG
1000 TABLET ORAL ONCE
Refills: 0 | Status: COMPLETED | OUTPATIENT
Start: 2020-08-05 | End: 2020-08-05

## 2020-08-05 RX ORDER — IBUPROFEN 200 MG
600 TABLET ORAL EVERY 6 HOURS
Refills: 0 | Status: COMPLETED | OUTPATIENT
Start: 2020-08-05 | End: 2021-07-04

## 2020-08-05 RX ORDER — OXYCODONE HYDROCHLORIDE 5 MG/1
5 TABLET ORAL ONCE
Refills: 0 | Status: DISCONTINUED | OUTPATIENT
Start: 2020-08-05 | End: 2020-08-06

## 2020-08-05 RX ORDER — ONDANSETRON 8 MG/1
4 TABLET, FILM COATED ORAL ONCE
Refills: 0 | Status: COMPLETED | OUTPATIENT
Start: 2020-08-05 | End: 2020-08-05

## 2020-08-05 RX ORDER — OXYCODONE HYDROCHLORIDE 5 MG/1
5 TABLET ORAL
Refills: 0 | Status: DISCONTINUED | OUTPATIENT
Start: 2020-08-05 | End: 2020-08-06

## 2020-08-05 RX ORDER — OXYTOCIN 10 UNIT/ML
333.33 VIAL (ML) INJECTION
Qty: 20 | Refills: 0 | Status: DISCONTINUED | OUTPATIENT
Start: 2020-08-05 | End: 2020-08-06

## 2020-08-05 RX ORDER — DIPHENOXYLATE HCL/ATROPINE 2.5-.025MG
2 TABLET ORAL ONCE
Refills: 0 | Status: DISCONTINUED | OUTPATIENT
Start: 2020-08-05 | End: 2020-08-05

## 2020-08-05 RX ORDER — SODIUM CHLORIDE 9 MG/ML
3 INJECTION INTRAMUSCULAR; INTRAVENOUS; SUBCUTANEOUS EVERY 8 HOURS
Refills: 0 | Status: DISCONTINUED | OUTPATIENT
Start: 2020-08-05 | End: 2020-08-06

## 2020-08-05 RX ORDER — ACETAMINOPHEN 500 MG
975 TABLET ORAL
Refills: 0 | Status: DISCONTINUED | OUTPATIENT
Start: 2020-08-05 | End: 2020-08-06

## 2020-08-05 RX ORDER — OXYTOCIN 10 UNIT/ML
2 VIAL (ML) INJECTION
Qty: 30 | Refills: 0 | Status: DISCONTINUED | OUTPATIENT
Start: 2020-08-05 | End: 2020-08-05

## 2020-08-05 RX ORDER — MAGNESIUM HYDROXIDE 400 MG/1
30 TABLET, CHEWABLE ORAL
Refills: 0 | Status: DISCONTINUED | OUTPATIENT
Start: 2020-08-05 | End: 2020-08-06

## 2020-08-05 RX ORDER — CARBOPROST TROMETHAMINE 250 UG/ML
250 INJECTION, SOLUTION INTRAMUSCULAR ONCE
Refills: 0 | Status: COMPLETED | OUTPATIENT
Start: 2020-08-05 | End: 2020-08-05

## 2020-08-05 RX ORDER — KETOROLAC TROMETHAMINE 30 MG/ML
30 SYRINGE (ML) INJECTION ONCE
Refills: 0 | Status: DISCONTINUED | OUTPATIENT
Start: 2020-08-05 | End: 2020-08-05

## 2020-08-05 RX ORDER — TETANUS TOXOID, REDUCED DIPHTHERIA TOXOID AND ACELLULAR PERTUSSIS VACCINE, ADSORBED 5; 2.5; 8; 8; 2.5 [IU]/.5ML; [IU]/.5ML; UG/.5ML; UG/.5ML; UG/.5ML
0.5 SUSPENSION INTRAMUSCULAR ONCE
Refills: 0 | Status: DISCONTINUED | OUTPATIENT
Start: 2020-08-05 | End: 2020-08-06

## 2020-08-05 RX ORDER — LANOLIN
1 OINTMENT (GRAM) TOPICAL EVERY 6 HOURS
Refills: 0 | Status: DISCONTINUED | OUTPATIENT
Start: 2020-08-05 | End: 2020-08-06

## 2020-08-05 RX ORDER — AER TRAVELER 0.5 G/1
1 SOLUTION RECTAL; TOPICAL EVERY 4 HOURS
Refills: 0 | Status: DISCONTINUED | OUTPATIENT
Start: 2020-08-05 | End: 2020-08-06

## 2020-08-05 RX ORDER — PRAMOXINE HYDROCHLORIDE 150 MG/15G
1 AEROSOL, FOAM RECTAL EVERY 4 HOURS
Refills: 0 | Status: DISCONTINUED | OUTPATIENT
Start: 2020-08-05 | End: 2020-08-06

## 2020-08-05 RX ORDER — HYDROCORTISONE 1 %
1 OINTMENT (GRAM) TOPICAL EVERY 6 HOURS
Refills: 0 | Status: DISCONTINUED | OUTPATIENT
Start: 2020-08-05 | End: 2020-08-06

## 2020-08-05 RX ORDER — BENZOCAINE 10 %
1 GEL (GRAM) MUCOUS MEMBRANE EVERY 6 HOURS
Refills: 0 | Status: DISCONTINUED | OUTPATIENT
Start: 2020-08-05 | End: 2020-08-06

## 2020-08-05 RX ADMIN — SODIUM CHLORIDE 3 MILLILITER(S): 9 INJECTION INTRAMUSCULAR; INTRAVENOUS; SUBCUTANEOUS at 20:34

## 2020-08-05 RX ADMIN — Medication 2 MILLIUNIT(S)/MIN: at 10:49

## 2020-08-05 RX ADMIN — Medication 400 MILLIGRAM(S): at 16:55

## 2020-08-05 RX ADMIN — CARBOPROST TROMETHAMINE 250 MICROGRAM(S): 250 INJECTION, SOLUTION INTRAMUSCULAR at 16:50

## 2020-08-05 RX ADMIN — Medication 0.2 MILLIGRAM(S): at 16:15

## 2020-08-05 RX ADMIN — Medication 1000 MILLIUNIT(S)/MIN: at 14:15

## 2020-08-05 RX ADMIN — ONDANSETRON 4 MILLIGRAM(S): 8 TABLET, FILM COATED ORAL at 19:55

## 2020-08-05 RX ADMIN — Medication 2 TABLET(S): at 17:00

## 2020-08-05 RX ADMIN — Medication 30 MILLIGRAM(S): at 14:01

## 2020-08-05 RX ADMIN — Medication 600 MILLIGRAM(S): at 23:00

## 2020-08-05 RX ADMIN — Medication 0.2 MILLIGRAM(S): at 20:17

## 2020-08-05 RX ADMIN — Medication 600 MILLIGRAM(S): at 22:17

## 2020-08-05 NOTE — PRE-ANESTHESIA EVALUATION ADULT - NSANTHPMHFT_GEN_ALL_CORE
29F  with no significant PMHx requesting labor epidural. Denies Hx back problems or bleeding disorders.

## 2020-08-05 NOTE — PRE-ANESTHESIA EVALUATION ADULT - NSANTHOSAYNRD_GEN_A_CORE
No. TINO screening performed.  STOP BANG Legend: 0-2 = LOW Risk; 3-4 = INTERMEDIATE Risk; 5-8 = HIGH Risk

## 2020-08-05 NOTE — CHART NOTE - NSCHARTNOTEFT_GEN_A_CORE
Event Note    Notified by KINA Mayer that patient had a gush of bleeding after fundal check. 50CC's of clots evacuated from the fundus. Total bleeding on chux and pad = 350 CCs. Patient denying any symptoms. Denies headache, blurry vision. MD Mcmanus called to bedside. Decision made for 1000mg of rectal cytotec. After reviewing BP's with Dr. MADHU Alvarez, decision made to give IM methergine and to start a methergine series.     ICU Vital Signs Last 24 Hrs  T(C): 36.7 (05 Aug 2020 13:35), Max: 36.7 (05 Aug 2020 13:35)  T(F): 98.1 (05 Aug 2020 13:35), Max: 98.1 (05 Aug 2020 13:35)  HR: 49 (05 Aug 2020 14:35) (49 - 64)  BP: 113/60 (05 Aug 2020 14:35) (106/50 - 113/60)  RR: 16 (05 Aug 2020 14:35) (16 - 18)  SpO2: 98% (05 Aug 2020 14:35) (98% - 99%)    30yo  s/p . Vital signs stable.    Plan:  -IM Methergine  -Methergine series  -Monitor vitals  -Will reassess    Lawanda Luke PA-C

## 2020-08-05 NOTE — CHART NOTE - NSCHARTNOTEFT_GEN_A_CORE
Reassessment    Patient s/p  w/ PPH of 350CC's. Patient reassessed for bleeding. Fundus firm at umbilicus. No active bleeding noted. Light staining on pad. Patient s/p methergine, hemabate, cytotec IL.    ICU Vital Signs Last 24 Hrs  T(C): 36.9 (05 Aug 2020 17:35), Max: 36.9 (05 Aug 2020 17:35)  T(F): 98.4 (05 Aug 2020 17:35), Max: 98.4 (05 Aug 2020 17:35)  HR: 64 (05 Aug 2020 17:35) (49 - 76)  BP: 119/63 (05 Aug 2020 17:35) (106/50 - 140/67)  BP(mean): --  ABP: --  ABP(mean): --  RR: 18 (05 Aug 2020 17:05) (16 - 18)  SpO2: 98% (05 Aug 2020 17:05) (96% - 99%)    Assessment:  28yo  s/p  c/b PPH of 350CCs. Vitals signs stable. Blood pressure noted to be elevated when patient has her arm bent. Patient cleared for transfer to postpartum.    Plan:  -Transfer to postpartum  -Monitor vitals  -AM CBC    Lawanda Luke PA-C

## 2020-08-06 ENCOUNTER — TRANSCRIPTION ENCOUNTER (OUTPATIENT)
Age: 30
End: 2020-08-06

## 2020-08-06 VITALS
HEART RATE: 58 BPM | DIASTOLIC BLOOD PRESSURE: 68 MMHG | TEMPERATURE: 97 F | OXYGEN SATURATION: 98 % | SYSTOLIC BLOOD PRESSURE: 118 MMHG | RESPIRATION RATE: 18 BRPM

## 2020-08-06 LAB
HCT VFR BLD CALC: 37.2 % — SIGNIFICANT CHANGE UP (ref 34.5–45)
HGB BLD-MCNC: 12.4 G/DL — SIGNIFICANT CHANGE UP (ref 11.5–15.5)
MCHC RBC-ENTMCNC: 26.8 PG — LOW (ref 27–34)
MCHC RBC-ENTMCNC: 33.3 GM/DL — SIGNIFICANT CHANGE UP (ref 32–36)
MCV RBC AUTO: 80.3 FL — SIGNIFICANT CHANGE UP (ref 80–100)
NRBC # BLD: 0 /100 WBCS — SIGNIFICANT CHANGE UP (ref 0–0)
PLATELET # BLD AUTO: 218 K/UL — SIGNIFICANT CHANGE UP (ref 150–400)
RBC # BLD: 4.63 M/UL — SIGNIFICANT CHANGE UP (ref 3.8–5.2)
RBC # FLD: 14 % — SIGNIFICANT CHANGE UP (ref 10.3–14.5)
WBC # BLD: 12.82 K/UL — HIGH (ref 3.8–10.5)
WBC # FLD AUTO: 12.82 K/UL — HIGH (ref 3.8–10.5)

## 2020-08-06 PROCEDURE — 59409 OBSTETRICAL CARE: CPT | Mod: U9

## 2020-08-06 PROCEDURE — 11981 INSERTION DRUG DLVR IMPLANT: CPT | Mod: GC

## 2020-08-06 RX ORDER — IBUPROFEN 200 MG
1 TABLET ORAL
Qty: 0 | Refills: 0 | DISCHARGE
Start: 2020-08-06

## 2020-08-06 RX ADMIN — Medication 0.2 MILLIGRAM(S): at 00:46

## 2020-08-06 RX ADMIN — Medication 600 MILLIGRAM(S): at 18:41

## 2020-08-06 RX ADMIN — Medication 1 TABLET(S): at 12:24

## 2020-08-06 RX ADMIN — FAMOTIDINE 20 MILLIGRAM(S): 10 INJECTION INTRAVENOUS at 12:24

## 2020-08-06 RX ADMIN — Medication 600 MILLIGRAM(S): at 12:25

## 2020-08-06 RX ADMIN — Medication 0.2 MILLIGRAM(S): at 04:27

## 2020-08-06 RX ADMIN — Medication 600 MILLIGRAM(S): at 07:10

## 2020-08-06 RX ADMIN — Medication 0.2 MILLIGRAM(S): at 08:57

## 2020-08-06 RX ADMIN — Medication 975 MILLIGRAM(S): at 09:30

## 2020-08-06 RX ADMIN — SODIUM CHLORIDE 3 MILLILITER(S): 9 INJECTION INTRAMUSCULAR; INTRAVENOUS; SUBCUTANEOUS at 14:00

## 2020-08-06 RX ADMIN — Medication 0.2 MILLIGRAM(S): at 12:24

## 2020-08-06 RX ADMIN — Medication 975 MILLIGRAM(S): at 16:00

## 2020-08-06 RX ADMIN — Medication 975 MILLIGRAM(S): at 08:58

## 2020-08-06 RX ADMIN — Medication 600 MILLIGRAM(S): at 13:05

## 2020-08-06 RX ADMIN — Medication 600 MILLIGRAM(S): at 06:12

## 2020-08-06 RX ADMIN — Medication 600 MILLIGRAM(S): at 17:57

## 2020-08-06 RX ADMIN — Medication 975 MILLIGRAM(S): at 15:18

## 2020-08-06 NOTE — DISCHARGE NOTE OB - USE THE FOOD PYRAMID (LOCATED IN DISCHARGE MATERIALS PROVIDED) AS A GUIDE TO BALANCE AND MODERATION
Occupational Therapy  Facility/Department: [facility]  Daily Treatment Note  NAME: Suzan May  :   MRN: [id number]    Date of Service: 2017    Patient Diagnosis(es): There were no encounter diagnoses. has a past medical history of Abnormality of gait and mobility; Acute sinusitis; Anxiety; Aspiration into respiratory tract; Aspiration pneumonia (HCC); BPH (benign prostatic hyperplasia); COPD (chronic obstructive pulmonary disease) ([facility] [address] Debility; Elevated CK; Foraminal stenosis of lumbosacral region; GERD (gastroesophageal reflux disease); History of asthma; HTN (hypertension); Hyperlipidemia; Hypothyroidism; Insomnia; Lower extremity weakness; On home oxygen therapy; Osteoarthritis of spine with radiculopathy, lumbar region; Papillary thyroid carcinoma ([facility] [address] Positive D dimer; Sleep apnea; Type 2 diabetes mellitus ([facility] [address] and Vitamin D deficiency. has a past surgical history that includes knee surgery (Left, ); Thyroidectomy (); bronchoscopy (N/A, 2017); and thoracotomy (Right, 2017). Restrictions  Restrictions/Precautions: Fall Risk, General Precautions  Other position/activity restrictions: no straws  2 liters O2  Subjective   General  Referring Practitioner: Dr. Bethel Solomon  Diagnosis: Right thoracotomy wedge resection for foreign body  Pre Treatment Pain Screening  Pain at present: 0  Pain Assessment  Patient Currently in Pain: No  Objective     Pt. was instructed on, completed and provided with UE HEP's and theraputty for continued hand strengthening. Pt. completed sit/stand transfers safely with supr. From standing position pt. demonstrated increased standing tolerances w/o lob during b/ue reaching,placing, crossing midline activities for 7 and 10 minute durations. Assessment   Activity Tolerance: Patient Tolerated treatment well     All safety precautions in place.   Discharge Recommendations:  Continue to assess pending progress     Plan   Plan  Times per week: 5-7x/week,  minutes per day  Plan weeks: 10-14 days  Current Treatment Recommendations: Strengthening, Endurance Training, Patient/Caregiver Education & Training, Self-Care / ADL, Home Management Training, Safety Education & Training  Plan Comment: Continue per OT POC    Goals  Patient Goals   Patient goals : \"Be able to do things without being winded. \" \"I want to get back into the community. \"       Therapy Time   Individual Concurrent Group Co-treatment   Time In 1100         Time Out 1200         Minutes [id number] CHERIE Rodriguez    Electronically signed by CHERIE Ngo on 11/29/2017 at 4:20 PM Statement Selected

## 2020-08-06 NOTE — DISCHARGE NOTE OB - CARE PLAN
Principal Discharge DX:	Vaginal delivery  Goal:	Recovery  Assessment and plan of treatment:	Make your follow-up appointment with your doctor as ordered.   No heavy lifting, driving, or strenuous activity for 6 weeks.  Nothing per vagina such as tampons, intercourse, douches or tub baths for 6 weeks or until you see your doctor.  Call your doctor if you're unable to tolerate food, increase in vaginal bleeding, or have difficulty urinating. Call your doctor if you have pain that is not relieved by your perscribed medications. Notify your doctor with any other concerns.    HTN/PEC?  Perscription handed to you for a blood pressure cuff to monitor your blood pressures at home. Call your doctor if your blood pressure is greater than or equal to 160 systolic (top number) of 110 diastolic (bottom number) or if you experience a headache unreleived by OTC medications, blurred vision, or difficulty breathing.

## 2020-08-06 NOTE — DISCHARGE NOTE OB - PLAN OF CARE
Recovery Make your follow-up appointment with your doctor as ordered.   No heavy lifting, driving, or strenuous activity for 6 weeks.  Nothing per vagina such as tampons, intercourse, douches or tub baths for 6 weeks or until you see your doctor.  Call your doctor if you're unable to tolerate food, increase in vaginal bleeding, or have difficulty urinating. Call your doctor if you have pain that is not relieved by your perscribed medications. Notify your doctor with any other concerns.    HTN/PEC?  Perscription handed to you for a blood pressure cuff to monitor your blood pressures at home. Call your doctor if your blood pressure is greater than or equal to 160 systolic (top number) of 110 diastolic (bottom number) or if you experience a headache unreleived by OTC medications, blurred vision, or difficulty breathing.

## 2020-08-06 NOTE — PROGRESS NOTE ADULT - ATTENDING COMMENTS
Patient seen and examined by me.  Agree with above resident note. The one thing the patient pointed out was recurrent epigastric pain, noted prior to delivery as well. For GI evaluation as an outpatient. Also noted diarrhea resolving after use of Hemabate and then Lomotil.

## 2020-08-06 NOTE — PROGRESS NOTE ADULT - PROBLEM SELECTOR PLAN 1
- Continue with po analgesia  - Increase ambulation  - Continue regular diet  - IV lock  - No labs  - potential for d/c this afternoon    Kelly Rich, PGY1

## 2020-08-06 NOTE — DISCHARGE NOTE OB - PATIENT PORTAL LINK FT
You can access the FollowMyHealth Patient Portal offered by [facility] by registering at the following website: [email or web address]. By joining [employer]’s FollowMyHealth portal, you will also be able to view your health information using other applications (apps) compatible with our system.

## 2020-08-06 NOTE — PROGRESS NOTE ADULT - SUBJECTIVE AND OBJECTIVE BOX
Patient seen and examined at bedside, no acute overnight events. Pt has mild HA, otherwise pain well controlled. Patient is ambulating, voiding spontaneously, passing gas, and tolerating regular diet. Denies CP, SOB, N/V, HA, blurred vision, epigastric pain.    Vital Signs Last 24 Hours  T(C): 37 (08-06-20 @ 01:00), Max: 37 (08-06-20 @ 01:00)  HR: 50 (08-06-20 @ 01:00) (45 - 76)  BP: 117/85 (08-06-20 @ 01:00) (106/50 - 148/76)  RR: 17 (08-06-20 @ 01:00) (16 - 18)  SpO2: 99% (08-06-20 @ 01:00) (96% - 100%)    Physical Exam:  General: NAD  Abdomen: Soft, non-tender, non-distended, fundus firm  Pelvic: Lochia wnl    Labs:    Blood Type: O Positive  Antibody Screen: Negative  RPR: Negative               12.3   10.49 )-----------( 239      ( 08-04 @ 14:02 )             36.7                12.0   10.07 )-----------( 244      ( 08-03 @ 15:29 )             35.8         MEDICATIONS  (STANDING):  acetaminophen   Tablet .. 975 milliGRAM(s) Oral <User Schedule>  dextrose 5% + lactated ringers. 1000 milliLiter(s) (125 mL/Hr) IV Continuous <Continuous>  diphtheria/tetanus/pertussis (acellular) Vaccine (ADAcel) 0.5 milliLiter(s) IntraMuscular once  famotidine    Tablet 20 milliGRAM(s) Oral daily  ibuprofen  Tablet. 600 milliGRAM(s) Oral every 6 hours  methylergonovine 0.2 milliGRAM(s) Oral every 4 hours  misoprostol 25 MICROGram(s) Oral every 3 hours  oxytocin Infusion 333.333 milliUNIT(s)/Min (1000 mL/Hr) IV Continuous <Continuous>  oxytocin Infusion 333.333 milliUNIT(s)/Min (1000 mL/Hr) IV Continuous <Continuous>  prenatal multivitamin 1 Tablet(s) Oral daily  sodium chloride 0.9% lock flush 3 milliLiter(s) IV Push every 8 hours    MEDICATIONS  (PRN):  benzocaine 20%/menthol 0.5% Spray 1 Spray(s) Topical every 6 hours PRN for Perineal discomfort  dibucaine 1% Ointment 1 Application(s) Topical every 6 hours PRN Perineal discomfort  diphenhydrAMINE 25 milliGRAM(s) Oral every 6 hours PRN Pruritus  hydrocortisone 1% Cream 1 Application(s) Topical every 6 hours PRN Moderate Pain (4-6)  lanolin Ointment 1 Application(s) Topical every 6 hours PRN nipple soreness  magnesium hydroxide Suspension 30 milliLiter(s) Oral two times a day PRN Constipation  oxyCODONE    IR 5 milliGRAM(s) Oral every 3 hours PRN Moderate to Severe Pain (4-10)  oxyCODONE    IR 5 milliGRAM(s) Oral once PRN Moderate to Severe Pain (4-10)  pramoxine 1%/zinc 5% Cream 1 Application(s) Topical every 4 hours PRN Moderate Pain (4-6)  simethicone 80 milliGRAM(s) Chew every 4 hours PRN Gas  witch hazel Pads 1 Application(s) Topical every 4 hours PRN Perineal discomfort

## 2020-08-06 NOTE — DISCHARGE NOTE OB - MATERIALS PROVIDED
Breastfeeding Log/Back To Sleep Handout/Birth Certificate Instructions/  Immunization Record/[facility] Screening Program/Breastfeeding Guide and Packet/Vaccinations/[facility] Hearing Screen Program/Shaken Baby Prevention Handout/Breastfeeding Mother’s Support Group Information/Guide to Postpartum Care/Discharge Medication Information for Patients and Families Pocket Guide

## 2020-08-06 NOTE — DISCHARGE NOTE OB - MEDICATION SUMMARY - MEDICATIONS TO CHANGE
I will SWITCH the dose or number of times a day I take the medications listed below when I get home from the hospital:    Prenatal 1 oral capsule    acetaminophen 325 mg oral tablet  -- 1 tab(s) by mouth once, As needed, Temp greater or equal to 38C (100.4F)

## 2020-08-07 ENCOUNTER — APPOINTMENT (OUTPATIENT)
Dept: ANTEPARTUM | Facility: CLINIC | Age: 30
End: 2020-08-07

## 2020-08-07 PROCEDURE — 82570 ASSAY OF URINE CREATININE: CPT

## 2020-08-07 PROCEDURE — 84550 ASSAY OF BLOOD/URIC ACID: CPT

## 2020-08-07 PROCEDURE — 85730 THROMBOPLASTIN TIME PARTIAL: CPT

## 2020-08-07 PROCEDURE — 84156 ASSAY OF PROTEIN URINE: CPT

## 2020-08-07 PROCEDURE — 87635 SARS-COV-2 COVID-19 AMP PRB: CPT

## 2020-08-07 PROCEDURE — 86769 SARS-COV-2 COVID-19 ANTIBODY: CPT

## 2020-08-07 PROCEDURE — 83615 LACTATE (LD) (LDH) ENZYME: CPT

## 2020-08-07 PROCEDURE — 59050 FETAL MONITOR W/REPORT: CPT

## 2020-08-07 PROCEDURE — 85610 PROTHROMBIN TIME: CPT

## 2020-08-07 PROCEDURE — 85384 FIBRINOGEN ACTIVITY: CPT

## 2020-08-07 PROCEDURE — 81001 URINALYSIS AUTO W/SCOPE: CPT

## 2020-08-07 PROCEDURE — 86850 RBC ANTIBODY SCREEN: CPT

## 2020-08-07 PROCEDURE — 85027 COMPLETE CBC AUTOMATED: CPT

## 2020-08-07 PROCEDURE — 86901 BLOOD TYPING SEROLOGIC RH(D): CPT

## 2020-08-07 PROCEDURE — G0463: CPT

## 2020-08-07 PROCEDURE — 86780 TREPONEMA PALLIDUM: CPT

## 2020-08-07 PROCEDURE — 80053 COMPREHEN METABOLIC PANEL: CPT

## 2020-08-07 PROCEDURE — 86900 BLOOD TYPING SEROLOGIC ABO: CPT

## 2020-08-07 PROCEDURE — 59025 FETAL NON-STRESS TEST: CPT

## 2020-08-09 DIAGNOSIS — U07.1 COVID-19: ICD-10-CM

## 2020-08-09 DIAGNOSIS — E66.9 OBESITY, UNSPECIFIED: ICD-10-CM

## 2020-08-09 DIAGNOSIS — O09.90 SUPERVISION OF HIGH RISK PREGNANCY, UNSPECIFIED, UNSPECIFIED TRIMESTER: ICD-10-CM

## 2020-08-11 DIAGNOSIS — U07.1 COVID-19: ICD-10-CM

## 2020-08-11 DIAGNOSIS — Z34.90 ENCOUNTER FOR SUPERVISION OF NORMAL PREGNANCY, UNSPECIFIED, UNSPECIFIED TRIMESTER: ICD-10-CM

## 2020-09-01 ENCOUNTER — OUTPATIENT (OUTPATIENT)
Dept: OUTPATIENT SERVICES | Facility: HOSPITAL | Age: 30
LOS: 1 days | End: 2020-09-01
Payer: MEDICAID

## 2020-09-01 ENCOUNTER — APPOINTMENT (OUTPATIENT)
Dept: MATERNAL FETAL MEDICINE | Facility: CLINIC | Age: 30
End: 2020-09-01
Payer: MEDICAID

## 2020-09-01 ENCOUNTER — LABORATORY RESULT (OUTPATIENT)
Age: 30
End: 2020-09-01

## 2020-09-01 VITALS
HEART RATE: 63 BPM | HEIGHT: 61.81 IN | OXYGEN SATURATION: 98 % | TEMPERATURE: 98.6 F | DIASTOLIC BLOOD PRESSURE: 70 MMHG | SYSTOLIC BLOOD PRESSURE: 102 MMHG

## 2020-09-01 DIAGNOSIS — Z90.49 ACQUIRED ABSENCE OF OTHER SPECIFIED PARTS OF DIGESTIVE TRACT: Chronic | ICD-10-CM

## 2020-09-01 PROCEDURE — 80053 COMPREHEN METABOLIC PANEL: CPT

## 2020-09-01 PROCEDURE — 99213 OFFICE O/P EST LOW 20 MIN: CPT | Mod: GE

## 2020-09-01 PROCEDURE — 36415 COLL VENOUS BLD VENIPUNCTURE: CPT

## 2020-09-01 PROCEDURE — G0463: CPT

## 2020-09-11 NOTE — ED ADULT TRIAGE NOTE - MEANS OF ARRIVAL
Health Maintenance Due   Topic Date Due   • Diabetes Eye Exam  [birth year]/1969   • Hepatitis B Vaccine (1 of 3 - Risk 3-dose series) 04/05/1970   • Shingles Vaccine (1 of 2) 04/05/2001       Patient is due for topics as listed above but is not proceeding with Immunization(s) Hep B and Shingles and Diabetes Eye Exam at this time.            ambulatory

## 2020-09-15 ENCOUNTER — APPOINTMENT (OUTPATIENT)
Dept: MATERNAL FETAL MEDICINE | Facility: CLINIC | Age: 30
End: 2020-09-15

## 2020-09-22 ENCOUNTER — OUTPATIENT (OUTPATIENT)
Dept: OUTPATIENT SERVICES | Facility: HOSPITAL | Age: 30
LOS: 1 days | End: 2020-09-22
Payer: MEDICAID

## 2020-09-22 ENCOUNTER — APPOINTMENT (OUTPATIENT)
Dept: MATERNAL FETAL MEDICINE | Facility: CLINIC | Age: 30
End: 2020-09-22
Payer: MEDICAID

## 2020-09-22 VITALS
TEMPERATURE: 98.3 F | OXYGEN SATURATION: 99 % | HEIGHT: 61.81 IN | DIASTOLIC BLOOD PRESSURE: 82 MMHG | HEART RATE: 81 BPM | SYSTOLIC BLOOD PRESSURE: 112 MMHG

## 2020-09-22 DIAGNOSIS — O28.8 OTHER ABNORMAL FINDINGS ON ANTENATAL SCREENING OF MOTHER: ICD-10-CM

## 2020-09-22 DIAGNOSIS — O09.90 SUPERVISION OF HIGH RISK PREGNANCY, UNSPECIFIED, UNSPECIFIED TRIMESTER: ICD-10-CM

## 2020-09-22 DIAGNOSIS — R06.02 OTHER SPECIFIED PREGNANCY RELATED CONDITIONS, UNSPECIFIED TRIMESTER: ICD-10-CM

## 2020-09-22 DIAGNOSIS — D57.3 SICKLE-CELL TRAIT: ICD-10-CM

## 2020-09-22 DIAGNOSIS — Z87.19 PERSONAL HISTORY OF OTHER DISEASES OF THE DIGESTIVE SYSTEM: ICD-10-CM

## 2020-09-22 DIAGNOSIS — Z34.92 ENCOUNTER FOR SUPERVISION OF NORMAL PREGNANCY, UNSPECIFIED, SECOND TRIMESTER: ICD-10-CM

## 2020-09-22 DIAGNOSIS — O09.899 SUPERVISION OF OTHER HIGH RISK PREGNANCIES, UNSPECIFIED TRIMESTER: ICD-10-CM

## 2020-09-22 DIAGNOSIS — K64.4 RESIDUAL HEMORRHOIDAL SKIN TAGS: ICD-10-CM

## 2020-09-22 DIAGNOSIS — O09.899 OTHER ABNORMAL FINDINGS ON ANTENATAL SCREENING OF MOTHER: ICD-10-CM

## 2020-09-22 DIAGNOSIS — O21.9 VOMITING OF PREGNANCY, UNSPECIFIED: ICD-10-CM

## 2020-09-22 DIAGNOSIS — R73.9 HYPERGLYCEMIA, UNSPECIFIED: ICD-10-CM

## 2020-09-22 DIAGNOSIS — R79.89 OTHER SPECIFIED ABNORMAL FINDINGS OF BLOOD CHEMISTRY: ICD-10-CM

## 2020-09-22 DIAGNOSIS — Z90.49 ACQUIRED ABSENCE OF OTHER SPECIFIED PARTS OF DIGESTIVE TRACT: Chronic | ICD-10-CM

## 2020-09-22 DIAGNOSIS — O26.899 OTHER SPECIFIED PREGNANCY RELATED CONDITIONS, UNSPECIFIED TRIMESTER: ICD-10-CM

## 2020-09-22 PROCEDURE — G0463: CPT

## 2020-09-22 PROCEDURE — 0503F POSTPARTUM CARE VISIT: CPT

## 2020-10-12 ENCOUNTER — APPOINTMENT (OUTPATIENT)
Dept: OBGYN | Facility: CLINIC | Age: 30
End: 2020-10-12

## 2020-10-18 NOTE — ATTENDING NOTE
[Staffed While Pt in Clinic, Pt Seen/Examined by Me] : Staffed while patient in clinic, patient seen and examined by me

## 2020-10-18 NOTE — HISTORY OF PRESENT ILLNESS
[Postpartum Follow Up] : postpartum follow up [Complications:___] : complications include: [unfilled] [] : delivered by vaginal delivery [Female] : Delivery History: baby girl [Wt. ___] : weighing [unfilled] [Pertussis Vaccine] : Pertussis vaccine administered [Breastfeeding] : currently nursing [Discharge HCT: ___] : hematocrit level was [unfilled] [Discharge HGB: ___] : hemoglobin level was [unfilled] [Resumed Menses] : has resumed her menses [Intended Contraception] : Intended Contraception: [Back to Normal] : is back to normal in size [None] : no vaginal bleeding [Healing Well] : is healing well [Examination Of The Breasts] : breasts are normal [Awake] : awake [Alert] : alert [Soft] : soft [No Lesions] : no genitalia lesions [Labia Majora] : labia major [Labia Minora] : labia minora [Normal] : clitoris [Pink Rugae] : pink rugae [No Bleeding] : there was no active vaginal bleeding [Normal Position] : in a normal position [No Tenderness] : no rectal tenderness [Nl Sphincter Tone] : normal sphincter tone [Doing Well] : is doing well [No Sign of Infection] : is showing no signs of infection [Rubella Vaccine] : Rubella vaccine was not administered [Rhogam] : Rhogam was not administered [Resumed [place]] : has not resumed intercourse [BTL] : no tubal ligation [Acute Distress] : no acute distress [Cervix Sample Taken] : cervical sample not taken for a Pap smear [LAD] : no lymphadenopathy [Thyroid Nodule] : no thyroid nodule [Goiter] : no goiter [Mass] : no breast mass [Axillary LAD] : no axillary lymphadenopathy [Nipple Discharge] : no nipple discharge [Distended] : not distended [Tender] : non tender [H/Smegaly] : no hepatosplenomegaly [IUD String] : did not have an IUD string protruding out [Discharge] : had no discharge [Motion Tenderness] : there was no cervical motion tenderness [Pap Obtained] : a Pap smear was not performed [Tenderness] : nontender [Enlarged ___ wks] : not enlarged [Mass ___ cm] : no uterine mass was palpated [Ovarian Mass (___ Cm)] : there were no adnexal masses [Adnexa Tenderness] : were not tender

## 2020-10-19 ENCOUNTER — APPOINTMENT (OUTPATIENT)
Dept: OBGYN | Facility: CLINIC | Age: 30
End: 2020-10-19
Payer: MEDICAID

## 2020-10-19 VITALS — WEIGHT: 209 LBS | BODY MASS INDEX: 38.46 KG/M2 | DIASTOLIC BLOOD PRESSURE: 78 MMHG | SYSTOLIC BLOOD PRESSURE: 120 MMHG

## 2020-10-19 PROCEDURE — 99215 OFFICE O/P EST HI 40 MIN: CPT | Mod: NC

## 2020-10-19 PROCEDURE — 11982 REMOVE DRUG IMPLANT DEVICE: CPT | Mod: NC

## 2020-10-20 ENCOUNTER — APPOINTMENT (OUTPATIENT)
Dept: OBGYN | Facility: CLINIC | Age: 30
End: 2020-10-20

## 2021-03-07 NOTE — H&P ADULT. - PSYCHIATRIC
NEPHROLOGY INTERVAL HPI/OVERNIGHT EVENTS:    Complains of constipation.    MEDICATIONS  (STANDING):  carvedilol 12.5 milliGRAM(s) Oral every 12 hours  cloNIDine 0.2 milliGRAM(s) Oral every 12 hours  gabapentin 100 milliGRAM(s) Oral daily  levothyroxine 75 MICROGram(s) Oral daily  multivitamin 1 Tablet(s) Oral daily  pantoprazole  Injectable 40 milliGRAM(s) IV Push two times a day  simvastatin 20 milliGRAM(s) Oral at bedtime  sodium zirconium cyclosilicate 10 Gram(s) Oral daily    MEDICATIONS  (PRN):  HYDROmorphone  Injectable 0.5 milliGRAM(s) IV Push every 6 hours PRN Severe Pain (7 - 10)      Allergies    No Known Allergies        Vital Signs Last 24 Hrs  T(C): 36.9 (07 Mar 2021 07:47), Max: 36.9 (07 Mar 2021 07:47)  T(F): 98.4 (07 Mar 2021 07:47), Max: 98.4 (07 Mar 2021 07:47)  HR: 72 (07 Mar 2021 07:47) (65 - 72)  BP: 150/72 (07 Mar 2021 07:47) (106/71 - 150/72)  BP(mean): --  RR: 18 (07 Mar 2021 07:47) (18 - 18)  SpO2: 98% (07 Mar 2021 07:47) (95% - 100%)  T(C): 36.4 (06 Mar 2021 11:40), Max: 36.9 (06 Mar 2021 05:12)  T(F): 97.6 (06 Mar 2021 11:40), Max: 98.5 (06 Mar 2021 05:12)  HR: 68 (06 Mar 2021 11:40) (68 - 76)  BP: 134/85 (06 Mar 2021 11:40) (76/39 - 134/85)        PHYSICAL EXAM:    GENERAL: Alert in bed  HEAD:    EYES: wnl  ENMT:   NECK: veins not full  NERVOUS SYSTEM: alert, verbal   CHEST/LUNG: no 02, decreased bs bases  HEART: Sternal scar, no rub, 1/6 murmur same  ABDOMEN: obese not tender, distended  EXTREMITIES: Left upper arm access with bruit   LYMPH:   SKIN: No rash    LABS:    03-06    131<L>  |  90<L>  |  105.0<H>  ----------------------------<  213<H>  5.3   |  22.0  |  5.78<H>    Ca    9.1      06 Mar 2021 14:55                            8.3    13.71 )-----------( 218      ( 05 Mar 2021 07:01 )             26.6     03-05    x   |  x   |  x   ----------------------------<  x   5.8<H>   |  x   |  x     Ca    9.9      05 Mar 2021 07:01  Phos  4.6     03-05  Mg     3.2     03-04    TPro  6.8  /  Alb  4.1  /  TBili  0.3<L>  /  DBili  x   /  AST  29  /  ALT  19  /  AlkPhos  136<H>  03-03    PT/INR - ( 04 Mar 2021 18:41 )   PT: 15.5 sec;   INR: 1.36 ratio             Phosphorus Level, Serum: 4.6 mg/dL (03-05 @ 07:01)  Magnesium, Serum: 3.2 mg/dL (03-04 @ 16:10)          RADIOLOGY & ADDITIONAL TESTS:   details…

## 2021-06-26 NOTE — ASSESSMENT
[FreeTextEntry1] : 29F hx external hemorrhoids, LIGSIL (low grade squamous intraepithelial dysplasia), obesity, , presenting to the clinic for CPE.
- - -

## 2021-08-24 ENCOUNTER — LABORATORY RESULT (OUTPATIENT)
Age: 31
End: 2021-08-24

## 2021-08-24 ENCOUNTER — OUTPATIENT (OUTPATIENT)
Dept: OUTPATIENT SERVICES | Facility: HOSPITAL | Age: 31
LOS: 1 days | End: 2021-08-24
Payer: MEDICAID

## 2021-08-24 ENCOUNTER — APPOINTMENT (OUTPATIENT)
Dept: INTERNAL MEDICINE | Facility: CLINIC | Age: 31
End: 2021-08-24
Payer: MEDICAID

## 2021-08-24 VITALS
SYSTOLIC BLOOD PRESSURE: 110 MMHG | BODY MASS INDEX: 38.64 KG/M2 | DIASTOLIC BLOOD PRESSURE: 80 MMHG | HEART RATE: 85 BPM | WEIGHT: 210 LBS | HEIGHT: 62 IN | OXYGEN SATURATION: 98 %

## 2021-08-24 DIAGNOSIS — E66.9 OBESITY, UNSPECIFIED: ICD-10-CM

## 2021-08-24 DIAGNOSIS — Z90.49 ACQUIRED ABSENCE OF OTHER SPECIFIED PARTS OF DIGESTIVE TRACT: Chronic | ICD-10-CM

## 2021-08-24 DIAGNOSIS — I10 ESSENTIAL (PRIMARY) HYPERTENSION: ICD-10-CM

## 2021-08-24 PROCEDURE — 36415 COLL VENOUS BLD VENIPUNCTURE: CPT

## 2021-08-24 PROCEDURE — G0463: CPT

## 2021-08-24 PROCEDURE — 99214 OFFICE O/P EST MOD 30 MIN: CPT | Mod: GC

## 2021-08-24 PROCEDURE — 85027 COMPLETE CBC AUTOMATED: CPT

## 2021-08-24 PROCEDURE — 82175 ASSAY OF ARSENIC: CPT

## 2021-08-24 PROCEDURE — 80053 COMPREHEN METABOLIC PANEL: CPT

## 2021-08-24 PROCEDURE — 83655 ASSAY OF LEAD: CPT

## 2021-08-24 PROCEDURE — 84202 ASSAY RBC PROTOPORPHYRIN: CPT

## 2021-08-24 PROCEDURE — 83036 HEMOGLOBIN GLYCOSYLATED A1C: CPT

## 2021-08-25 LAB
A1C WITH ESTIMATED AVERAGE GLUCOSE RESULT: 5.8 % — HIGH (ref 4–5.6)
ALBUMIN SERPL ELPH-MCNC: 4.6 G/DL — SIGNIFICANT CHANGE UP (ref 3.3–5)
ALP SERPL-CCNC: 185 U/L — HIGH (ref 40–120)
ALT FLD-CCNC: 18 U/L — SIGNIFICANT CHANGE UP (ref 10–45)
ANION GAP SERPL CALC-SCNC: 10 MMOL/L — SIGNIFICANT CHANGE UP (ref 5–17)
AST SERPL-CCNC: 18 U/L — SIGNIFICANT CHANGE UP (ref 10–40)
BILIRUB SERPL-MCNC: 0.2 MG/DL — SIGNIFICANT CHANGE UP (ref 0.2–1.2)
BUN SERPL-MCNC: 14 MG/DL — SIGNIFICANT CHANGE UP (ref 7–23)
CALCIUM SERPL-MCNC: 9.6 MG/DL — SIGNIFICANT CHANGE UP (ref 8.4–10.5)
CHLORIDE SERPL-SCNC: 106 MMOL/L — SIGNIFICANT CHANGE UP (ref 96–108)
CO2 SERPL-SCNC: 24 MMOL/L — SIGNIFICANT CHANGE UP (ref 22–31)
CREAT SERPL-MCNC: 0.84 MG/DL — SIGNIFICANT CHANGE UP (ref 0.5–1.3)
ESTIMATED AVERAGE GLUCOSE: 120 MG/DL — HIGH (ref 68–114)
GLUCOSE SERPL-MCNC: 91 MG/DL — SIGNIFICANT CHANGE UP (ref 70–99)
HCT VFR BLD CALC: 39.6 % — SIGNIFICANT CHANGE UP (ref 34.5–45)
HGB BLD-MCNC: 12.8 G/DL — SIGNIFICANT CHANGE UP (ref 11.5–15.5)
LEAD BLD-MCNC: <1 UG/DL — SIGNIFICANT CHANGE UP (ref 0–4)
MCHC RBC-ENTMCNC: 26.2 PG — LOW (ref 27–34)
MCHC RBC-ENTMCNC: 32.3 GM/DL — SIGNIFICANT CHANGE UP (ref 32–36)
MCV RBC AUTO: 81.1 FL — SIGNIFICANT CHANGE UP (ref 80–100)
PLATELET # BLD AUTO: 375 K/UL — SIGNIFICANT CHANGE UP (ref 150–400)
POTASSIUM SERPL-MCNC: 4.3 MMOL/L — SIGNIFICANT CHANGE UP (ref 3.5–5.3)
POTASSIUM SERPL-SCNC: 4.3 MMOL/L — SIGNIFICANT CHANGE UP (ref 3.5–5.3)
PROT SERPL-MCNC: 7.8 G/DL — SIGNIFICANT CHANGE UP (ref 6–8.3)
RBC # BLD: 4.88 M/UL — SIGNIFICANT CHANGE UP (ref 3.8–5.2)
RBC # FLD: 14.4 % — SIGNIFICANT CHANGE UP (ref 10.3–14.5)
SODIUM SERPL-SCNC: 140 MMOL/L — SIGNIFICANT CHANGE UP (ref 135–145)
WBC # BLD: 8.93 K/UL — SIGNIFICANT CHANGE UP (ref 3.8–10.5)
WBC # FLD AUTO: 8.93 K/UL — SIGNIFICANT CHANGE UP (ref 3.8–10.5)

## 2021-08-27 ENCOUNTER — NON-APPOINTMENT (OUTPATIENT)
Age: 31
End: 2021-08-27

## 2021-08-27 PROBLEM — E66.9 OBESITY (BMI 30-39.9): Status: ACTIVE | Noted: 2019-11-12

## 2021-08-27 LAB
ZPP BLD-MCNC: 29 UG/DL — SIGNIFICANT CHANGE UP (ref 0–36)
ZPP RBC-MCNC: 29 UG/DL — SIGNIFICANT CHANGE UP (ref 0–36)

## 2021-08-28 LAB
ARSENIC SERPL-MCNC: 3 UG/L — SIGNIFICANT CHANGE UP (ref 2–23)
CADMIUM SERPL-MCNC: <0.5 UG/L — SIGNIFICANT CHANGE UP (ref 0–1.2)
LEAD BLD-MCNC: 1 UG/DL — SIGNIFICANT CHANGE UP (ref 0–4)
MERCURY SERPL-MCNC: <1 UG/L — SIGNIFICANT CHANGE UP (ref 0–14.9)

## 2021-08-31 DIAGNOSIS — Z77.011 CONTACT WITH AND (SUSPECTED) EXPOSURE TO LEAD: ICD-10-CM

## 2021-08-31 DIAGNOSIS — E66.9 OBESITY, UNSPECIFIED: ICD-10-CM

## 2021-08-31 NOTE — PHYSICAL EXAM
[No Acute Distress] : no acute distress [PERRL] : pupils equal round and reactive to light [No Lymphadenopathy] : no lymphadenopathy [Supple] : supple [Clear to Auscultation] : lungs were clear to auscultation bilaterally [Normal S1, S2] : normal S1 and S2 [No Murmur] : no murmur heard [No Edema] : there was no peripheral edema [Soft] : abdomen soft [Non Tender] : non-tender [No CVA Tenderness] : no CVA  tenderness [No Rash] : no rash [Coordination Grossly Intact] : coordination grossly intact [No Focal Deficits] : no focal deficits [Normal Gait] : normal gait [Speech Grossly Normal] : speech grossly normal [de-identified] : obese

## 2021-08-31 NOTE — HISTORY OF PRESENT ILLNESS
[FreeTextEntry1] : cpe, lead exposure [de-identified] : 30F obesity coming in for cpe and to discuss her child's lead poisoning. Child screened positive for lead and she reports confirmatory venous sampling was also positive. She feels well, no belly pain, constipation, neuropsych symptoms, headaches, palpations, cp, sob, pins/needles. Voiding well. Does not know source, the [facility] took samples from both her home and her mother's home as her two kids spend time there. Otherwise feels well.\par \par HCM\par - pap+ - already did repeat\par - PHQ2 = 0 \par - diet: lots of sodas, fast foods, snacks\par - flu shot this season - too early right now

## 2021-08-31 NOTE — PLAN
[FreeTextEntry1] : - cbc, cmp, a1c, tsh, lead level, heavy metal screen, zinc protoporphyrin\par - f/u in 5 weeks for obesity f/u - will keep food diary\par - repeat pap pending with gyn

## 2021-08-31 NOTE — COUNSELING
[Potential consequences of obesity discussed] : Potential consequences of obesity discussed [Benefits of weight loss discussed] : Benefits of weight loss discussed [Structured Weight Management Program suggested:] : Structured weight management program suggested [Weigh Self Weekly] : weigh self weekly [Decrease Portions] : decrease portions [Keep Food Diary] : keep food diary [Good understanding] : Patient has a good understanding of disease, goals and obesity follow-up plan [FreeTextEntry2] : lots of sodas, snacking, processed foods [FreeTextEntry3] : f/u in 5 weeks [FreeTextEntry4] : 19

## 2021-08-31 NOTE — ASSESSMENT
[FreeTextEntry1] : 30F obesity, recent lead expose, child with lead poisoning. Unclear source, [facility] investigation underway - suspect mother's house\par \par She has no s/s of lead poisoning. \par \par

## 2021-08-31 NOTE — REVIEW OF SYSTEMS
[Fever] : no fever [Chills] : no chills [Chest Pain] : no chest pain [Palpitations] : no palpitations [Lower Ext Edema] : no lower extremity edema [Orthopnea] : no orthopnea [Shortness Of Breath] : no shortness of breath [Dyspnea on Exertion] : no dyspnea on exertion [Constipation] : no constipation [Dysuria] : no dysuria [Joint Pain] : no joint pain [Back Pain] : no back pain [Headache] : no headache [Suicidal] : not suicidal

## 2021-08-31 NOTE — HEALTH RISK ASSESSMENT
[Good] : ~his/her~ current health as good [1 or 2 (0 pts)] : 1 or 2 (0 points) [Never (0 pts)] : Never (0 points) [0] : 2) Feeling down, depressed, or hopeless: Not at all (0) [PHQ-2 Negative - No further assessment needed] : PHQ-2 Negative - No further assessment needed [] : No [[id number]] : 0

## 2021-09-09 ENCOUNTER — APPOINTMENT (OUTPATIENT)
Dept: OBGYN | Facility: CLINIC | Age: 31
End: 2021-09-09

## 2021-09-30 ENCOUNTER — NON-APPOINTMENT (OUTPATIENT)
Age: 31
End: 2021-09-30

## 2021-09-30 ENCOUNTER — OUTPATIENT (OUTPATIENT)
Dept: OUTPATIENT SERVICES | Facility: HOSPITAL | Age: 31
LOS: 1 days | End: 2021-09-30
Payer: MEDICAID

## 2021-09-30 ENCOUNTER — LABORATORY RESULT (OUTPATIENT)
Age: 31
End: 2021-09-30

## 2021-09-30 ENCOUNTER — APPOINTMENT (OUTPATIENT)
Dept: INTERNAL MEDICINE | Facility: CLINIC | Age: 31
End: 2021-09-30
Payer: MEDICAID

## 2021-09-30 VITALS
HEIGHT: 62 IN | SYSTOLIC BLOOD PRESSURE: 104 MMHG | RESPIRATION RATE: 16 BRPM | OXYGEN SATURATION: 97 % | WEIGHT: 209 LBS | DIASTOLIC BLOOD PRESSURE: 80 MMHG | HEART RATE: 63 BPM | BODY MASS INDEX: 38.46 KG/M2

## 2021-09-30 DIAGNOSIS — I10 ESSENTIAL (PRIMARY) HYPERTENSION: ICD-10-CM

## 2021-09-30 DIAGNOSIS — Z90.49 ACQUIRED ABSENCE OF OTHER SPECIFIED PARTS OF DIGESTIVE TRACT: Chronic | ICD-10-CM

## 2021-09-30 PROCEDURE — 99213 OFFICE O/P EST LOW 20 MIN: CPT | Mod: GE

## 2021-09-30 PROCEDURE — 36415 COLL VENOUS BLD VENIPUNCTURE: CPT

## 2021-09-30 PROCEDURE — G0463: CPT | Mod: 25

## 2021-09-30 PROCEDURE — 85025 COMPLETE CBC W/AUTO DIFF WBC: CPT

## 2021-09-30 PROCEDURE — 93005 ELECTROCARDIOGRAM TRACING: CPT

## 2021-09-30 PROCEDURE — 80053 COMPREHEN METABOLIC PANEL: CPT

## 2021-10-01 LAB
ALBUMIN SERPL ELPH-MCNC: 4.6 G/DL — SIGNIFICANT CHANGE UP (ref 3.3–5)
ALP SERPL-CCNC: 170 U/L — HIGH (ref 40–120)
ALT FLD-CCNC: 12 U/L — SIGNIFICANT CHANGE UP (ref 10–45)
ANION GAP SERPL CALC-SCNC: 12 MMOL/L — SIGNIFICANT CHANGE UP (ref 5–17)
AST SERPL-CCNC: 14 U/L — SIGNIFICANT CHANGE UP (ref 10–40)
BASOPHILS # BLD AUTO: 0.05 K/UL — SIGNIFICANT CHANGE UP (ref 0–0.2)
BASOPHILS NFR BLD AUTO: 0.5 % — SIGNIFICANT CHANGE UP (ref 0–2)
BILIRUB SERPL-MCNC: 0.2 MG/DL — SIGNIFICANT CHANGE UP (ref 0.2–1.2)
BUN SERPL-MCNC: 13 MG/DL — SIGNIFICANT CHANGE UP (ref 7–23)
CALCIUM SERPL-MCNC: 10.2 MG/DL — SIGNIFICANT CHANGE UP (ref 8.4–10.5)
CHLORIDE SERPL-SCNC: 107 MMOL/L — SIGNIFICANT CHANGE UP (ref 96–108)
CO2 SERPL-SCNC: 21 MMOL/L — LOW (ref 22–31)
CREAT SERPL-MCNC: 0.63 MG/DL — SIGNIFICANT CHANGE UP (ref 0.5–1.3)
EOSINOPHIL # BLD AUTO: 0.11 K/UL — SIGNIFICANT CHANGE UP (ref 0–0.5)
EOSINOPHIL NFR BLD AUTO: 1.1 % — SIGNIFICANT CHANGE UP (ref 0–6)
GLUCOSE SERPL-MCNC: 87 MG/DL — SIGNIFICANT CHANGE UP (ref 70–99)
HCT VFR BLD CALC: 42.6 % — SIGNIFICANT CHANGE UP (ref 34.5–45)
HGB BLD-MCNC: 13.2 G/DL — SIGNIFICANT CHANGE UP (ref 11.5–15.5)
IMM GRANULOCYTES NFR BLD AUTO: 0.3 % — SIGNIFICANT CHANGE UP (ref 0–1.5)
LYMPHOCYTES # BLD AUTO: 3.1 K/UL — SIGNIFICANT CHANGE UP (ref 1–3.3)
LYMPHOCYTES # BLD AUTO: 31.8 % — SIGNIFICANT CHANGE UP (ref 13–44)
MCHC RBC-ENTMCNC: 25.9 PG — LOW (ref 27–34)
MCHC RBC-ENTMCNC: 31 GM/DL — LOW (ref 32–36)
MCV RBC AUTO: 83.5 FL — SIGNIFICANT CHANGE UP (ref 80–100)
MONOCYTES # BLD AUTO: 0.54 K/UL — SIGNIFICANT CHANGE UP (ref 0–0.9)
MONOCYTES NFR BLD AUTO: 5.5 % — SIGNIFICANT CHANGE UP (ref 2–14)
NEUTROPHILS # BLD AUTO: 5.92 K/UL — SIGNIFICANT CHANGE UP (ref 1.8–7.4)
NEUTROPHILS NFR BLD AUTO: 60.8 % — SIGNIFICANT CHANGE UP (ref 43–77)
PLATELET # BLD AUTO: 367 K/UL — SIGNIFICANT CHANGE UP (ref 150–400)
POTASSIUM SERPL-MCNC: 4.7 MMOL/L — SIGNIFICANT CHANGE UP (ref 3.5–5.3)
POTASSIUM SERPL-SCNC: 4.7 MMOL/L — SIGNIFICANT CHANGE UP (ref 3.5–5.3)
PROT SERPL-MCNC: 8.1 G/DL — SIGNIFICANT CHANGE UP (ref 6–8.3)
RBC # BLD: 5.1 M/UL — SIGNIFICANT CHANGE UP (ref 3.8–5.2)
RBC # FLD: 14.4 % — SIGNIFICANT CHANGE UP (ref 10.3–14.5)
SODIUM SERPL-SCNC: 140 MMOL/L — SIGNIFICANT CHANGE UP (ref 135–145)
WBC # BLD: 9.75 K/UL — SIGNIFICANT CHANGE UP (ref 3.8–10.5)
WBC # FLD AUTO: 9.75 K/UL — SIGNIFICANT CHANGE UP (ref 3.8–10.5)

## 2021-10-04 ENCOUNTER — NON-APPOINTMENT (OUTPATIENT)
Age: 31
End: 2021-10-04

## 2021-10-04 NOTE — REVIEW OF SYSTEMS
[Fever] : no fever [Chills] : no chills [Palpitations] : no palpitations [Lower Ext Edema] : no lower extremity edema [Orthopnea] : no orthopnea [Shortness Of Breath] : no shortness of breath [Dyspnea on Exertion] : no dyspnea on exertion [Abdominal Pain] : no abdominal pain [Nausea] : no nausea [Vomiting] : no vomiting [Dysuria] : no dysuria [Joint Pain] : no joint pain [Skin Rash] : no skin rash [Headache] : no headache

## 2021-10-04 NOTE — HISTORY OF PRESENT ILLNESS
[FreeTextEntry8] : 31 , currently breastfeeding, recently resumed period (s/p nexplanon, also breastfeeding) here to discuss L breast pain. Non exertional CP, no SOB. Feels tender at 2:00 L outer quadrant of breast when she touches, breastfeeding makes it worse, but no overlying redness, warmth. No h/o breast cancer or family hx. No discharge form nipple besides milk, no pus or blood. No fevers/chills. \par \par EKG done in office stable form previous, no ischemic changes.\par \par I recently saw her for lead testing as her infant was exposed to lead and suffered from lead poisoning due to lead at the patient's mother's house; the patient's lead levels were negative.

## 2021-10-04 NOTE — ASSESSMENT
[FreeTextEntry1] : 31F  recently resumed period after lactational amenorrhea here for breast tenderness, likely fibroadenoma as she just resumed her period and is also breastfeeding. No s/s infection currently but ddx includes mastitis \par

## 2021-10-04 NOTE — PLAN
[FreeTextEntry1] : - check labs cbc, cmp \par - ekg - stable from prior, nonischemic\par - will check ultrasound to confirm diagnosis\par - f/u in 5 weeks

## 2021-10-04 NOTE — PHYSICAL EXAM
[No Acute Distress] : no acute distress [Well Nourished] : well nourished [PERRL] : pupils equal round and reactive to light [No Accessory Muscle Use] : no accessory muscle use [Clear to Auscultation] : lungs were clear to auscultation bilaterally [Regular Rhythm] : with a regular rhythm [Normal S1, S2] : normal S1 and S2 [No Murmur] : no murmur heard [No Abdominal Bruit] : a ~M bruit was not heard ~T in the abdomen [No Edema] : there was no peripheral edema [Soft] : abdomen soft [Non Tender] : non-tender [Normal Axillary Nodes] : no axillary lymphadenopathy [No CVA Tenderness] : no CVA  tenderness [de-identified] : R breast nromal; L breast tender cystic structre 2 cm, 2:00 position at 5 cm vector from nipple, nonfluctuant, no LAD in axllae, no nipple discharge, redness, warmth

## 2021-10-08 DIAGNOSIS — N64.4 MASTODYNIA: ICD-10-CM

## 2021-10-08 DIAGNOSIS — R07.89 OTHER CHEST PAIN: ICD-10-CM

## 2021-10-18 NOTE — ED ADULT NURSE NOTE - CAS EDP DISCH TYPE
Show Aperture Variable?: Yes
Consent: The patient's consent was obtained including but not limited to risks of crusting, scabbing, blistering, scarring, darker or lighter pigmentary change, recurrence, incomplete removal and infection.
Render Note In Bullet Format When Appropriate: No
Number Of Freeze-Thaw Cycles: 1 freeze-thaw cycle
Duration Of Freeze Thaw-Cycle (Seconds): 30
Post-Care Instructions: I reviewed with the patient in detail post-care instructions. Patient is to wear sunprotection, and avoid picking at any of the treated lesions. Pt may apply Vaseline to crusted or scabbing areas.
Detail Level: Zone
Home

## 2021-10-25 ENCOUNTER — APPOINTMENT (OUTPATIENT)
Dept: ULTRASOUND IMAGING | Facility: CLINIC | Age: 31
End: 2021-10-25

## 2021-12-03 NOTE — PATIENT PROFILE ADULT - HEALTH LITERACY
34 yo mostly Kazakh-speaking F with a PMH reported iron deficiency anemia and hypothyroidism (not on meds) who presents with SOB and dizziness. She has been having dyspnea, particularly on exertion but also at rest, for about 2 years now, along with lightheadedness and fatigue. 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 Her symptoms have worsened over the last 3 months, and she had not had the time to make a doctor's appointment until now. She is from [place] and came to [place] to visit a friend, but she was seen at the [facility] today was noted to have a Hb of 4.9, and was sent to the ED. Other symptoms she endorses are frontal pulsating headaches that make her sometimes sensitive to light, which can be a couple days in a row, but she can also go weeks without. She also endorses diffuse joint aches that occur in the same frequency, in her wrists, elbows, and knees. She also endorses fevers, as high as 102-103F, chills and sweats, and wide weight fluctuations of up to 30 pounds in the last couple years (although she denies a specific overall weight trend).  She denies nausea, vomiting, CP, diarrhea, constipation, blood in her stool, blood in her urine, or dysuria. She fee    She was diagnosed with iron deficiency anemia about 18 years ago. She was told it was due to heavy periods. She has intermittently taken iron pills and folic acid, but not every day. She states that she had always had periods every month, but occasionally heavy periods (sometimes 8-9 days), until this , when she began to have a month without periods. Her last menstrual period was in August, although she has had spotting in both August and October.  She has had three separate occasions of blood transfusions. The first time was when she had her  (does not know how many units), and the second and third times were 2 units each. Her last transfusion was in 2019. Ever since her anemia diagnosis, her Hb has not been above 9 (and only there after transfusions). Normally, her Hb is 4-5, but has been as low as 3.  She does note she has always bruised easily, sometimes without trauma.    In the ED, she was given 250 ml NS x1. no

## 2022-01-19 ENCOUNTER — EMERGENCY (EMERGENCY)
Facility: HOSPITAL | Age: 32
LOS: 1 days | Discharge: HOME | End: 2022-01-19
Attending: EMERGENCY MEDICINE
Payer: MEDICAID

## 2022-01-19 VITALS — DIASTOLIC BLOOD PRESSURE: 66 MMHG | SYSTOLIC BLOOD PRESSURE: 100 MMHG | OXYGEN SATURATION: 100 % | HEART RATE: 104 BPM

## 2022-01-19 VITALS
OXYGEN SATURATION: 100 % | HEIGHT: 62.5 IN | TEMPERATURE: 100 F | WEIGHT: 182.1 LBS | RESPIRATION RATE: 22 BRPM | HEART RATE: 140 BPM

## 2022-01-19 DIAGNOSIS — Z90.49 ACQUIRED ABSENCE OF OTHER SPECIFIED PARTS OF DIGESTIVE TRACT: Chronic | ICD-10-CM

## 2022-01-19 LAB
ALBUMIN SERPL ELPH-MCNC: 4.5 G/DL — SIGNIFICANT CHANGE UP (ref 3.3–5)
ALP SERPL-CCNC: 193 U/L — HIGH (ref 40–120)
ALT FLD-CCNC: 49 U/L — HIGH (ref 10–45)
ANION GAP SERPL CALC-SCNC: 16 MMOL/L — SIGNIFICANT CHANGE UP (ref 5–17)
AST SERPL-CCNC: 40 U/L — SIGNIFICANT CHANGE UP (ref 10–40)
BASOPHILS # BLD AUTO: 0.03 K/UL — SIGNIFICANT CHANGE UP (ref 0–0.2)
BASOPHILS NFR BLD AUTO: 0.3 % — SIGNIFICANT CHANGE UP (ref 0–2)
BILIRUB SERPL-MCNC: 0.4 MG/DL — SIGNIFICANT CHANGE UP (ref 0.2–1.2)
BUN SERPL-MCNC: 12 MG/DL — SIGNIFICANT CHANGE UP (ref 7–23)
CALCIUM SERPL-MCNC: 9.8 MG/DL — SIGNIFICANT CHANGE UP (ref 8.4–10.5)
CHLORIDE SERPL-SCNC: 100 MMOL/L — SIGNIFICANT CHANGE UP (ref 96–108)
CO2 SERPL-SCNC: 18 MMOL/L — LOW (ref 22–31)
CREAT SERPL-MCNC: 0.67 MG/DL — SIGNIFICANT CHANGE UP (ref 0.5–1.3)
CRP SERPL-MCNC: 138 MG/L — HIGH (ref 0–4)
D DIMER BLD IA.RAPID-MCNC: 262 NG/ML DDU — HIGH
EOSINOPHIL # BLD AUTO: 0.09 K/UL — SIGNIFICANT CHANGE UP (ref 0–0.5)
EOSINOPHIL NFR BLD AUTO: 0.9 % — SIGNIFICANT CHANGE UP (ref 0–6)
GLUCOSE SERPL-MCNC: 111 MG/DL — HIGH (ref 70–99)
HCG SERPL-ACNC: <2 MIU/ML — SIGNIFICANT CHANGE UP
HCT VFR BLD CALC: 38.5 % — SIGNIFICANT CHANGE UP (ref 34.5–45)
HGB BLD-MCNC: 12.9 G/DL — SIGNIFICANT CHANGE UP (ref 11.5–15.5)
HIV 1 & 2 AB SERPL IA.RAPID: SIGNIFICANT CHANGE UP
IMM GRANULOCYTES NFR BLD AUTO: 0.3 % — SIGNIFICANT CHANGE UP (ref 0–1.5)
LYMPHOCYTES # BLD AUTO: 1.34 K/UL — SIGNIFICANT CHANGE UP (ref 1–3.3)
LYMPHOCYTES # BLD AUTO: 13.6 % — SIGNIFICANT CHANGE UP (ref 13–44)
MCHC RBC-ENTMCNC: 26.1 PG — LOW (ref 27–34)
MCHC RBC-ENTMCNC: 33.5 GM/DL — SIGNIFICANT CHANGE UP (ref 32–36)
MCV RBC AUTO: 77.9 FL — LOW (ref 80–100)
MONOCYTES # BLD AUTO: 0.89 K/UL — SIGNIFICANT CHANGE UP (ref 0–0.9)
MONOCYTES NFR BLD AUTO: 9 % — SIGNIFICANT CHANGE UP (ref 2–14)
NEUTROPHILS # BLD AUTO: 7.49 K/UL — HIGH (ref 1.8–7.4)
NEUTROPHILS NFR BLD AUTO: 75.9 % — SIGNIFICANT CHANGE UP (ref 43–77)
NRBC # BLD: 0 /100 WBCS — SIGNIFICANT CHANGE UP (ref 0–0)
PLATELET # BLD AUTO: 266 K/UL — SIGNIFICANT CHANGE UP (ref 150–400)
POTASSIUM SERPL-MCNC: 3.8 MMOL/L — SIGNIFICANT CHANGE UP (ref 3.5–5.3)
POTASSIUM SERPL-SCNC: 3.8 MMOL/L — SIGNIFICANT CHANGE UP (ref 3.5–5.3)
PROCALCITONIN SERPL-MCNC: 1.19 NG/ML — HIGH (ref 0.02–0.1)
PROT SERPL-MCNC: 8.7 G/DL — HIGH (ref 6–8.3)
RBC # BLD: 4.94 M/UL — SIGNIFICANT CHANGE UP (ref 3.8–5.2)
RBC # FLD: 13.8 % — SIGNIFICANT CHANGE UP (ref 10.3–14.5)
SODIUM SERPL-SCNC: 134 MMOL/L — LOW (ref 135–145)
WBC # BLD: 9.87 K/UL — SIGNIFICANT CHANGE UP (ref 3.8–10.5)
WBC # FLD AUTO: 9.87 K/UL — SIGNIFICANT CHANGE UP (ref 3.8–10.5)

## 2022-01-19 PROCEDURE — 84145 PROCALCITONIN (PCT): CPT

## 2022-01-19 PROCEDURE — 71045 X-RAY EXAM CHEST 1 VIEW: CPT

## 2022-01-19 PROCEDURE — 80053 COMPREHEN METABOLIC PANEL: CPT

## 2022-01-19 PROCEDURE — 36415 COLL VENOUS BLD VENIPUNCTURE: CPT

## 2022-01-19 PROCEDURE — U0003: CPT

## 2022-01-19 PROCEDURE — 99284 EMERGENCY DEPT VISIT MOD MDM: CPT

## 2022-01-19 PROCEDURE — 85025 COMPLETE CBC W/AUTO DIFF WBC: CPT

## 2022-01-19 PROCEDURE — 99285 EMERGENCY DEPT VISIT HI MDM: CPT | Mod: 25

## 2022-01-19 PROCEDURE — 85379 FIBRIN DEGRADATION QUANT: CPT

## 2022-01-19 PROCEDURE — 86140 C-REACTIVE PROTEIN: CPT

## 2022-01-19 PROCEDURE — U0005: CPT

## 2022-01-19 PROCEDURE — 71045 X-RAY EXAM CHEST 1 VIEW: CPT | Mod: 26

## 2022-01-19 PROCEDURE — 82728 ASSAY OF FERRITIN: CPT

## 2022-01-19 PROCEDURE — 84702 CHORIONIC GONADOTROPIN TEST: CPT

## 2022-01-19 PROCEDURE — 86703 HIV-1/HIV-2 1 RESULT ANTBDY: CPT

## 2022-01-19 RX ORDER — IBUPROFEN 200 MG
600 TABLET ORAL ONCE
Refills: 0 | Status: COMPLETED | OUTPATIENT
Start: 2022-01-19 | End: 2022-01-19

## 2022-01-19 RX ORDER — SODIUM CHLORIDE 9 MG/ML
1000 INJECTION INTRAMUSCULAR; INTRAVENOUS; SUBCUTANEOUS ONCE
Refills: 0 | Status: COMPLETED | OUTPATIENT
Start: 2022-01-19 | End: 2022-01-19

## 2022-01-19 RX ORDER — ACETAMINOPHEN 500 MG
975 TABLET ORAL ONCE
Refills: 0 | Status: COMPLETED | OUTPATIENT
Start: 2022-01-19 | End: 2022-01-19

## 2022-01-19 RX ADMIN — Medication 975 MILLIGRAM(S): at 22:18

## 2022-01-19 RX ADMIN — SODIUM CHLORIDE 1000 MILLILITER(S): 9 INJECTION INTRAMUSCULAR; INTRAVENOUS; SUBCUTANEOUS at 22:21

## 2022-01-19 RX ADMIN — Medication 600 MILLIGRAM(S): at 22:16

## 2022-01-19 NOTE — ED PROVIDER NOTE - OBJECTIVE STATEMENT
Justin Beltran (MD):  31 F w/ PMHx of asthma c/o fevers, sore throat, body aches, dry cough, and diarrhea x5 days. Pt reports having COVID 1.5 years ago. COVID vaccinated x3. Denies SOB, chest pain, and abdominal pain.

## 2022-01-19 NOTE — ED PROVIDER NOTE - CLINICAL SUMMARY MEDICAL DECISION MAKING FREE TEXT BOX
Justin Beltran (MD):  31 F w/ PMHx of asthma p/w COVID infection w/ tachypnea and tachycardia. Will get basic labs, chest x-ray, antipruritics, hydrate, and re-evaluate.

## 2022-01-19 NOTE — ED PROVIDER NOTE - PHYSICAL EXAMINATION
Justin Beltran (MD):  Well appearing, in NAD  Moist mucosae  Pink conjunctivae  Lungs clear, mildly tachypneic to 25-30, O2 sat WNL  Tachycardic to 120  Abdomen soft/NT  No CVAT  No pedal edema, no calf TTP  Neck supple  AAOx3, no gross neuro deficits   Obese  Diffuse erythema in back of throat, enlarged tonsils, no exudates, uvula midline, no lymphadenopathy

## 2022-01-19 NOTE — ED PROVIDER NOTE - NSFOLLOWUPINSTRUCTIONS_ED_ALL_ED_FT
Take 975mg of Tylenol every 6hrs and 600mg Ibuprofen (Advil/Motrin) every 8 hrs as needed for pains/fevers.  Drink plenty of fluids.  Follow up with your doctor or in the Clinic as discussed within 2 days.  Return to the ER for any concerns.     Monitor your home oxygen levels as discussed      - Return if O2 saturation drops below 90%      - Quarantine until asymptomatic for 3 days      - Return if symptoms are not improving or if new concerning symptoms arise    Over-the-Counter Medications:     Vitamin C 500mg twice a day  Zinc 75-100mg once a day  Melatonin 6-12mg before bed  Vitamin D3 2000u once a day  Famotidine 20mg once a day

## 2022-01-19 NOTE — ED PROVIDER NOTE - PATIENT PORTAL LINK FT
You can access the FollowMyHealth Patient Portal offered by [facility] by registering at the following website: [email or web address] By joining [employer]’s FollowMyHealth portal, you will also be able to view your health information using other applications (apps) compatible with our system.

## 2022-01-20 ENCOUNTER — NON-APPOINTMENT (OUTPATIENT)
Age: 32
End: 2022-01-20

## 2022-01-20 LAB
FERRITIN SERPL-MCNC: 384 NG/ML — HIGH (ref 15–150)
SARS-COV-2 RNA SPEC QL NAA+PROBE: DETECTED

## 2022-01-24 ENCOUNTER — EMERGENCY (EMERGENCY)
Facility: HOSPITAL | Age: 32
LOS: 1 days | Discharge: HOME | End: 2022-01-24
Attending: EMERGENCY MEDICINE
Payer: MEDICAID

## 2022-01-24 VITALS
HEART RATE: 88 BPM | SYSTOLIC BLOOD PRESSURE: 107 MMHG | RESPIRATION RATE: 19 BRPM | OXYGEN SATURATION: 100 % | TEMPERATURE: 99 F | DIASTOLIC BLOOD PRESSURE: 72 MMHG

## 2022-01-24 VITALS
WEIGHT: 179.9 LBS | HEART RATE: 99 BPM | RESPIRATION RATE: 20 BRPM | DIASTOLIC BLOOD PRESSURE: 56 MMHG | TEMPERATURE: 99 F | OXYGEN SATURATION: 98 % | HEIGHT: 62.5 IN | SYSTOLIC BLOOD PRESSURE: 126 MMHG

## 2022-01-24 DIAGNOSIS — Z90.49 ACQUIRED ABSENCE OF OTHER SPECIFIED PARTS OF DIGESTIVE TRACT: Chronic | ICD-10-CM

## 2022-01-24 PROCEDURE — 99283 EMERGENCY DEPT VISIT LOW MDM: CPT

## 2022-01-24 PROCEDURE — 99284 EMERGENCY DEPT VISIT MOD MDM: CPT

## 2022-01-24 RX ORDER — FAMOTIDINE 10 MG/ML
1 INJECTION INTRAVENOUS
Qty: 5 | Refills: 0
Start: 2022-01-24 | End: 2022-01-28

## 2022-01-24 RX ORDER — ONDANSETRON 8 MG/1
1 TABLET, FILM COATED ORAL
Qty: 9 | Refills: 0
Start: 2022-01-24 | End: 2022-01-26

## 2022-01-24 NOTE — ED PROVIDER NOTE - NSFOLLOWUPINSTRUCTIONS_ED_ALL_ED_FT
Please follow up with your primary care physician within 2-3 days.  If you have any severe increase in pain, fever, uncontrollable nausea/vomiting, or inability to tolerate eating and drinking you need to come right back to the emergency room.    Take the ondansetron as prescribed. Dick un seguimiento con garcia médico de atención primaria dentro de 2 a 3 días.    Si tiene un aumento severo del dolor, fiebre, náuseas/vómitos incontrolables o incapacidad para tolerar comer y beber, debe regresar de inmediato a la jannette de emergencias.    [place] el ondansetrón según lo prescrito.          COVID-19 (Enfermedad por coronavirus 2019)  LO QUE NECESITA SABER:  ¿Qué necesito saber acerca de la enfermedad por coronavirus 2019 (COVID-19)?COVID-19 es la enfermedad causada por el nuevo coronavirus descubierto por primera vez en diciembre de 2019. Los coronavirus generalmente causan infecciones de las vías respiratorias superiores (nariz, garganta y pulmones), rick un resfriado. El nuevo virus también puede causar afecciones respiratorias inferiores graves, rick la neumonía o el síndrome de dificultad respiratoria aguda (SDRA). Cualquier persona puede desarrollar problemas graves a causa del nuevo virus, evie el riesgo es mayor si tiene 65 años o más. Un sistema inmunitario débil, la diabetes o jacky enfermedad cardíaca o pulmonar también pueden aumentar el riesgo.    ¿Cuáles son los signos y síntomas de la COVID-19?Es posible que no presente ningún signo o síntoma. Los signos y síntomas que se presentan suelen empezar unos 5 días después de la infección evie pueden tardar de 2 a 14 días. Los signos y síntomas pueden variar de leves a severos. Puede sentir rick si tuviera gripe o un resfriado vangie. La información sobre COVID-19 todavía se está aprendiendo. Dígale a garcia médico si william que se ha infectado evie desarrolla signos o síntomas que no se enumeran a continuación:  •Tos  •Falta de aliento o dificultad para respirar que puede llegar a ser grave  •Jacky fiebre de, al menos, 100.4 °F, o 38 °C (puede ser más baja en los adultos de 65 años o más)  •Escalofríos que pueden incluir temblores  •Dolor muscular, rich corporales o dolor de cecile  •El dolor de garganta  •De repente, no ser capaz de probar u oler nada  •Sensación de cansancio físico y mental (fatiga)  •Congestión (de la nariz y la cecile) o flujo nasal  •Diarrea, náuseas o vómitos  ¿Cómo se diagnostica la COVID-19?Llame a garcia médico si piensa que puede tener COVID-19. En algunas zonas, solo se realizan pruebas si jacky persona tiene síntomas graves o es hospitalizada. Las pruebas se hacen más ampliamente en otros lugares. Garcia médico le dirá lo que debe hacer basándose en rosi síntomas y en las normas de garcia felicia. En general, se puede utilizar lo siguiente:   •Un examen viralmuestra si tiene jacky infección actualmente. Se golden muestras de la nariz y la garganta, usualmente con hisopos. Es posible que tenga que esperar varios días para obtener los resultados de la prueba. Garcia médico le dirá cómo obtener los resultados. Tendrá que ponerse en cuarentena (mantenerse físicamente alejado de los demás) hasta que obtenga los resultados. Si los resultados muestran que tiene COVID-19, tendrá que ponerse en cuarentena hasta que esté andrea. Garcia médico u otro oficial de idalmis pueden darle más instrucciones. También tendrá que prevenir otra infección hasta que se sepa si puede contraer COVID-19 de nuevo.  •Jacky prueba de anticuerposmuestra si tuvo jacky infección en el pasado. Para esta prueba se utilizan muestras de zoie. Los anticuerpos son producidos por el sistema inmunitario para atacar el virus que causa la COVID-19. Los anticuerpos se formarán de 1 a 3 semanas después de que se contagie. No se sabe si los anticuerpos previenen jacky segunda infección, o por cuánto tiempo jacky persona podría estar protegida. Si tiene anticuerpos, tendrá que tener cuidado con los demás hasta que se sepa más.  •Tomografías o radiografíaspodrían realizarse para comprobar si existen signos de neumonía. El coronavirus 2019 causa un tipo específico de neumonía, generalmente en ambos pulmones.    ¿Cómo se trata la COVID-19?Ningún medicamento o tratamiento específico está actualmente aprobado para la COVID-19. Lo siguiente puede utilizarse para controlar los síntomas o tratar los efectos de la COVID-19:   •Los síntomas levespodrían mejorar por sí solos. Si no necesita ser tratado en un hospital, se le darán instrucciones para que siga en garcia casa. Controlarán atentamente garcia estado. Deberá estar atento al empeoramiento de los síntomas y buscar atención inmediata si es necesario. Hable con garcia médico acerca de lo siguiente:?Aliviar los síntomas.Para aliviar el dolor de garganta, dick gárgaras con agua salada tibia, o use pastillas para la garganta o un aerosol para la garganta. Garcia médico puede recomendarle un medicamento para la tos. Isaura más líquidos para disolver y aflojar la mucosidad y para prevenir la deshidratación. Use descongestionantes o gotas de solución salina rick se indica para la congestión nasal.  ?Los DEE DEE o el acetaminofenopueden ayudar a bajar la fiebre y aliviar los rich corporales o el dolor de cecile. Siga las indicaciones. Si no se golden correctamente, los DEE DEE pueden causar sangrado estomacal o daño renal y el acetaminofeno puede dañar hepático.  •Los síntomas severos o potencialmente mortalesse tratan en el hospital. Es posible que usted necesite jacky combinación de los siguientes:?Los medicamentospueden administrarse para reducen la inflamación o combatir el virus. También podría necesitar anticoagulantes para prevenir o tratar los coágulos de zoie. Si tiene trombosis venosa profunda (TVP) o embolia pulmonar (PE), chloé vez necesite seguir usando anticoagulantes isis 3 meses.  ?El oxígeno adicionalpodría administrarse si tiene insuficiencia respiratoria. [place] significa que los pulmones no pueden llevar suficiente oxígeno a la zoie y a los órganos. El oxígeno extra puede ayudar a prevenir la insuficiencia orgánica.  ?Un respiradorpodría usarse para ayudarlo a respirar.  ?El plasma (parte de la zoie) de convalecientede un paciente que se ha recuperado de la COVID-19 puede utilizarse. El plasma contiene anticuerpos que pueden ayudar a garcia cuerpo a combatir la infección. El plasma de convaleciente solo se administra a pacientes que tienen signos y síntomas severos.  ¿Cómo se propaga el coronavirus 2019?El virus se propaga rápida y fácilmente. Puede infectarse si está en contacto con jacky gran cantidad del virus, incluso isis poco tiempo. También puede infectarse por estar cerca de jacky pequeña cantidad del virus isis mucho tiempo. A continuación se indican las formas en que se william que se propaga el virus, evie es posible que surja más información:   •Las gotitas son la forma más común de propagación de todos los coronavirus.El virus puede viajar en gotitas que se juliane cuando jacky persona habla, tose o estornuda. Cualquiera que respire las gotitas o que las gotitas se le metan en los ojos puede infectarse con el virus. Se william que el contacto personal cercano con jacky persona infectada es la principal forma de propagación del virus. El contacto personal cercano significa estar a menos de 6 pies (2 metros) de otra persona.  •El contacto de persona a persona puede propagar el virus.Por ejemplo, jacky persona con el virus en rosi steffany puede propagarlo al darle la mano a alguien. En danisha momento, no parece que el virus pueda transmitirse a un bebé isis el embarazo o el parto. El bebé puede infectarse después de nacer por contacto de persona a persona. El virus tampoco parece propagarse por la leche materna. Si está embarazada o amamantando, hable con garcia médico u obstetra sobre cualquier preocupación que tenga.  •El virus puede permanecer en objetos y superficies.Jacky persona puede contraer el virus en rosi steffany al tocar el objeto o la superficie. La infección se produce si la persona se toca los ojos o la boca sin antes lavarse las steffany. Aún no se sabe cuánto tiempo puede permanecer el virus en un objeto o superficie. Por eso es importante limpiar todas las superficies que se usan regularmente.  •Un animal infectado puede ser capaz de infectar a jacky persona que lo toque.[place] puede ocurrir en mercados vivos o en jacky jeremiah.  ¿Cómo puede todo el brooke reducir el riesgo de COVID-19?La mejor manera de prevenir la infección es evitar a cualquiera que esté infectado, evie esto puede ser difícil de lograr. Jacky persona infectada puede propagar el virus antes de que aparezcan los signos o síntomas, o incluso si los signos o síntomas nunca se desarrollan. Lo siguiente puede ayudar a reducir el riesgo de infección:   Limite la propagación de las enfermedades infecciosas  •Lávese las steffany con frecuencia isis el día.Utilice agua y jabón. Frótese las steffany enjabonadas, entrelazando los dedos. Lávese el frente y el dorso de cada mano, y entre los dedos. Use los dedos de jacky mano para restregar debajo de las uñas de la otra mano. Lávese isis al menos 20 segundos. Enjuague con agua corriente caliente isis varios segundos. Luego séquese las steffany con jacky toalla limpia o jacky toalla de papel. Puede usar un desinfectante para steffany que contenga alcohol, si no hay agua y jabón disponibles. No se toque los ojos, la nariz o la boca sin antes lavarse las steffany. Enseñe a los niños a lavarse las steffany y a usar el desinfectante de steffany.  Lavado de steffany  •Cúbrase al toser o estornudar.[place] lanie que las gotitas viajen de usted a los demás. Gire la ronen y cúbrase la boca y la nariz con un pañuelo. Deseche el pañuelo. Use el ángulo del brazo si no tiene un pañuelo disponible. Luego lávese las steffany con agua y jabón o use un desinfectante de steffany. Gire la cecile y cúbrase si está cerca de alguien que está estornudando o tosiendo. Enséñeles a los niños a cubrirse al toser o estornudar.  •Siga las pautas de distanciamiento social a nivel local, nacional y mundial.El distanciamiento social significa que las personas evitan el contacto físico cercano para que el virus no se propague de jacky persona a otra. Mantenga al menos 6 pies (2 metros) de distancia entre usted y los demás. También mantenga esta distancia de cualquiera que venga a garcia casa, rick alguien que dick jacky entrega.  •Acostúmbrese a no tocarse la ronen.No se sabe cuánto tiempo puede permanecer el virus en los objetos y las superficies. Si tiene el virus en las steffany, puede transferirlo a los ojos, la nariz o la boca e infectarse. También puede transferirlo a los objetos, las superficies o las personas. Tenga cuidado con lo que toca cuando sale. Por ejemplo, los pasamanos y botones de ascensor. Intente no tocar nada con las steffany descubiertas a menos que sea necesario. Lávese las steffany antes de salir de garcia casa y cuando regresa.  •Limpie y desinfecte a menudo los objetos y las superficies de alto contacto.Use jacky solución o toallitas desinfectantes. Puede hacer jacky solución diluyendo 4 cucharaditas de lejía en 1 cuarto de galón (4 tazas) de agua. Limpie y desinfecte aunque piense que nadie que viva o haya entrado en garcia casa esté infectado con el virus. Puede limpiar los objetos con un paño desinfectante antes de llevarlos a garcia casa. Lávese las steffany después de manipular cualquier cosa que traiga a garcia casa.  •Dick que garcia sistema inmunitario esté lo más saludable posible.Un sistema inmunitario debilitado lo hace más vulnerable al nuevo coronavirus. No hay ninguna vacuna contra la COVID-19 disponible todavía. Las vacunas, rick la vacuna contra la gripe y la neumonía, pueden ayudar al sistema inmunitario. Garcia médico le indicará qué vacunas debe recibir y cuándo aplicárselas. Mantenga garcia sistema inmunitario lo más vangie posible. No fume. Consuma alimentos saludables, dick ejercicio regularmente e intente controlar el estrés. Acuéstese y levántese a la misma hora todos los días.   Alimentos saludables  ¿Cómo sigo las pautas de distanciamiento social para ayudar a reducir el riesgo de COVID-19?Las normas de distanciamiento social nacionales y locales varían. Las reglas pueden cambiar con el tiempo a medida que se levantan las restricciones. Las restricciones pueden volver a aplicarse si se produce un brote en el lugar donde usted vive. Es importante conocer y seguir todas las reglas de distanciamiento social actuales en garcia área. Las siguientes son reglas generales al respecto:  •Limite los viajes fuera de garcia casa.Es posible que se le entreguen alimentos, medicinas y otros suministros. Si es posible, dick que dejen los objetos que le entregan en garcia milad o en otra área. Intente que nadie le entregue un objeto en mano. Estará tan cerca de la persona que el virus puede propagarse entre ustedes.  •No tenga contacto físico cercano con nadie que no viva en garcia casa.No le dé la mano, abrace o bese a jacky persona rick saludo. Párese o camine lo más lejos posible de los demás. Si tiene que usar el transporte público (rick el autobús o el metro), intente sentarse o pararse lejos de los demás. Puede mantenerse conectado de forma farr con los demás a través de llamadas telefónicas, mensajes de correo electrónico, sitios web de medios sociales y videochats. Verifique cómo están las personas que pueden tener dificultades para distanciarse socialmente, o que viven solas. Pregunte a los administradores de los asilos de ancianos o de las instalaciones de cuidados a ang plazo cómo puede comunicarse con seguridad con alguien que vive allí.  •Use un tapabocas de bruce cuando esté cerca de otras personas que no viven en garcia casa.Los tapabocas ayudan evitar que el virus se propague a otras personas en las gotitas. Puede usar un tapabocas transparente si alguien necesita leer rosi labios. Danisha es un tapabocas con un plástico sobre el área de la boca para que se puedan amandeep los labios. No utilice tapabocas que tengan válvulas de respiración o respiraderos. El virus puede salir por la válvula o el respiradero y contagiar a otros. No se quite el tapabocas para hablar, toser o estornudar. No utilice tapabocas en niños menores de 2 años ni en personas que tengan problemas respiratorios o no puedan quitárselo  •Permita que solo los profesionales médicos u otros profesionales ingresen a garcia casa.Use el tapabocas y recuérdeles a los profesionales que usen un tapabocas. Recuérdeles que se laven las steffany cuando lleguen y antes de irse. No deje entrar a nadie que no viva en garcia casa, aunque no esté enfermo. Jacky persona puede contagiar el virus a otros antes de que comiencen los síntomas de COVID-19. Algunas personas ni siquiera desarrollan síntomas. Los niños suelen tener síntomas leves o ningún síntoma. Puede ser difícil decirle a un breana que no lo abrace ni lo bese. Explíquele que así es rick puede ayudarlo a mantenerse saludable.  •No vaya a la casa de otra persona, a menos que sea necesario.No vaya de visita, aunque la persona esté isaura. Vaya solo si necesita ayudarla. Asegúrese de que ambos usen un tapabocas mientras esté allí.  •Evite las grandes reuniones y las multitudes.Las reuniones o multitudes de 10 o más individuos pueden hacer que el virus se propague. Por ejemplo, las reuniones incluyen fiestas, eventos deportivos, servicios religiosos y conferencias. Se pueden formar multitudes en las playas, los parques y las atracciones turísticas. Protéjase manteniéndose alejado de las grandes reuniones y multitudes.  •Pregunte a garcia médico de qué otra forma puede tener las citas.Es posible que pueda tener citas sin tener que ir al consultorio del médico. Algunos médicos ofrecen citas por teléfono, video u otros tipos de citas. También puede obtener recetas de rosi medicamentos para varios meses de jacky vez.  •Manténgase a felicia si debe que salir a trabajar.Es posible que tenga un trabajo que solo se puede hacer fuera de garcia casa. Mantenga la distancia física entre usted y los demás trabajadores tanto rick sea posible. Siga las reglas de garcia empleador para que todos estén a felicia.  ¿Qué zac hacer si tengo COVID-19 y me estoy recuperando en casa?Los médicos le darán instrucciones específicas que debe seguir. Las siguientes son pautas generales para recordarle cómo mantener a los demás a felicia hasta que usted esté andrea:   •Lávese las steffany frecuentemente.Use agua y jabón tanto rick sea posible. Puede usar un desinfectante para steffany que contenga alcohol, si no hay agua y jabón disponibles. No comparta toallas con nadie. Si usa toallas de papel, deséchelas en un cubo de basura recubierto que se guarda en garcia habitación o área. Use un cubo de basura cubierto, si es posible.  •No salga de garcia casa a menos que sea necesario.Es posible que tenga que ir al consultorio de garcia médico para hacerse chequeos o para resurtir jacky receta. No llegue al consultorio del médico sin jacky santos. Los médicos tienen que hacer que rosi consultorios madhuri seguros para el personal y otros pacientes.  •No entre en contacto físico cercano con nadie, felicia que sea necesario.Solo tenga un contacto físico cercano con jacky persona que lo cuide directamente, o con un bebé o breana que deba cuidar. Los miembros de la reginald y los amigos no deben visitarlo. Si es posible, quédese en un área o habitación separada de garcia casa si vive con otras personas. Nadie debe entrar en el área o en la habitación excepto para brindarle cuidados. Puede visitar a los demás por teléfono, videochat, correo electrónico o sistemas similares. Es importante mantenerse conectado con los demás en garcia kathie mientras se recupera.  •Use un tapabocas mientras haya otras personas cerca de usted.[place] puede ayudar a evitar que las gotitas propaguen el virus cuando usted habla, estornuda o tose. Póngase el tapabocas antes de que la persona entre en garcia habitación o área. Recuérdele a la persona que se cubra la nariz y la boca antes de entrar a brindarle cuidados.  •No comparta artículos.No comparta platos, toallas ni otros artículos con nadie. Los artículos deben ser lavados después de usarlos.  •Proteja a garcia bebé.Lávese las steffany con agua y jabón con frecuencia isis todo el día. Use un tapabocas mientras amamanta o mientras se extrae o se saca la leche materna. Si es posible, pídale a alguien que esté andrea que cuide de garcia bebé. Puede poner la leche materna en biberones para que la persona la use, si es necesario. Hable con garcia médico si tiene preguntas o inquietudes acerca de cómo cuidar o vincularse con garcia bebé. También le dirá cuándo debe traer a garcia bebé para los chequeos y las vacunas. También le dirá qué hacer si william que garcia bebé está infectado con el nuevo virus.      •No manipule animales vivos.Hasta que se sepa más, es mejor no tocar, jugar o manipular animales vivos. Algunos animales, incluyendo las mascotas, manrique sido infectados con el nuevo coronavirus. No manipule ni cuide animales hasta que esté andrea. El cuidado incluye alimentar, acariciar y abrazar a garcia mascota. No deje que garcia mascota lo lama o comparta garcia comida. Pídale a alguien que no esté infectado que cuide de garcia mascota, si es posible. Si debe cuidar a jacky mascota, usa un tapabocas. Lávese las steffany antes y después de cuidar a garcia mascota.  •Siga las instrucciones de garcia médico para estar cerca de los demás después de recuperarse.Deberá esperar al menos 10 días después de la aparición de los síntomas. Entonces deberá pasar 24 horas sin fiebre sin recibir medicamentos para la fiebre, y sin otros síntomas. La pérdida del sentido del gusto o el olfato puede continuar isis varios meses. Se considera que está andrea estar cerca de otros si danisha es el único síntoma. No se sabe con certeza si jacky persona recuperada puede transmitir el virus a otros, ni por cuánto tiempo. Garcia médico puede darle instrucciones, rick continuar con el distanciamiento social o usar un tapabocas cuando esté cerca de otras personas.  ¿Cómo zac cuidar a alguien que tiene COVID-19?Si la persona vive en otro hogar, coordine un tiempo para brindar cuidados. Recuerde llevar algunos pares de guantes desechables y un tapabocas. Las siguientes son las pautas generales para ayudarle a cuidar de forma farr a cualquier persona que tenga COVID-19:  •Lávese las steffany frecuentemente.Lávese antes y después de entrar en la casa, área o habitación de la persona. Deseche las toallas de papel en un cubo de basura recubierto que tenga jacky tapa, si es posible.  •No permita que otros se acerquen a la persona.Nadie debe ingresar a la casa de la persona a menos que sea necesario. De ser posible, la persona debe estar en un área o habitación separada si vive con otras personas. Mantenga la milad de la habitación cerrada a menos que necesite entrar o salir. Dick que otras personas llamen, charlen por video o envíen un correo electrónico a la persona si se siente lo suficientemente andrea. La persona puede sentirse isaura si se la mantiene separada isis un ang período de tiempo. La comunicación farr puede ayudar a esta persona a mantenerse en contacto con garcia reginald y amigos.  •Asegúrese de que la habitación de la persona tenga un buen flujo de aire.Puede abrir la ventana si el clima lo permite. También se puede encender el aire acondicionado para ayudar a que el aire se mueva.  •Comuníquese con la persona antes de entrar para brindarle cuidados.Asegúrese de que la persona use un tapabocas. Recuérdele que se lave las steffany con agua y jabón. Puede usar un desinfectante para steffany que contenga alcohol, si no hay agua y jabón disponibles. Colóquese el tapabocas antes de entrar al lugar a brindar cuidados.  •Use guantes mientras melissa cuidados y limpia.Limpie los objetos que la persona usa a menudo. Limpie las encimeras, las superficies de cocción y los frentes y el interior del microondas y el refrigerador. Limpie la ducha, el sanitario, el área alrededor del sanitario, el lavabo, el área alrededor del lavabo y los grifos. Junte la ropa sucia o la ropa de cama. Lave y seque los artículos con el agua más caliente que permita la bruce. Lave los platos y utensilios usados en [place] y jabonosa o en un lavavajillas.  •Todo lo que deseche debe ir a un cubo de basura recubierto.Cuando necesite vaciar la basura, cierre el extremo abierto de la cubierta y átela. [place] ayuda a evitar que los artículos en los que está el virus se salgan de la basura. Quítese los guantes y deséchelos. Lávese las steffnay.      ¿Dónde puedo obtener más información?  •[facility] [employer] [facility] Prevention  [address] [place] [id number]  Phone: [phone]  Web Address: http://www.cdc.gov      ¿Qué zac hacer si pienso que yo o alguien que conozco está infectado?Dick lo siguiente para proteger a otras personas:   •Si se requiere atención de emergencia,avise al operador de la posible infección, o llame antes y avise al servicio de urgencias.  •Llame a un médicopara recibir instrucciones si los síntomas son leves. Cualquier persona que pueda estar infectada no debe llegar sin llamar shae. El médico deberá proteger a los miembros del personal y a otros pacientes.  •La persona que puede estar infectada debe usar un tapabocasmientras reciben atención médica. [place] ayudará a reducir el riesgo de infectar a otras personas. Nadie que sea gerardo de 2 años, que tenga problemas respiratorios o que no pueda quitárselo debe usar un tapabocas. El médico puede darle instrucciones para cualquier persona que no pueda usar un tapabocas.      Llame al número local de emergencias (911 en los [place]) o al departamento de emergencias si:  •Usted tiene dificultad para respirar o falta de aliento mientras descansa.  •Usted siente presión o dolor en el pecho que dura más de 5 minutos.  •Usted tiene confusión o es difícil despertarlo.  •Rosi labios o ronen están azules.  •Usted tiene fiebre de 104 ºF (40 °C) o más.  ¿Cuándo zac llamar a mi médico?  •No tiene síntomas de COVID-19 evie tuvo contacto físico cercano dentro de los 14 días con alguien que barak positivo.  •Usted tiene preguntas o inquietudes acerca de garcia condición o cuidado.      ACUERDOS SOBRE GARCIA CUIDADO:  Usted tiene el derecho de ayudar a planear garcia cuidado. Aprenda todo lo que pueda sobre garcia condición y rick darle tratamiento. Discuta rosi opciones de tratamiento con rosi médicos para decidir el cuidado que usted desea recibir. Usted siempre tiene el derecho de rechazar el tratamiento.

## 2022-01-24 NOTE — ED PROVIDER NOTE - PHYSICAL EXAMINATION
General: WN/WD NAD  Head: Atraumatic, normocephalic  Eyes: EOM grossly in tact, no scleral icterus  ENT: moist mucous membranes  Neurology: A&Ox3, nonfocal, JENNINGS x 4  Respiratory: normal respiratory effort, speaking in full sentences  CV: Extremities warm and well perfused, cap refill < 1 s  Abdominal: Soft, non-distended, non-tender to palpation, no rebound or guarding  Extremities: No edema  Skin: No rashes General: WN/WD NAD  Head: Atraumatic, normocephalic  Eyes: EOM grossly in tact, no scleral icterus  ENT: moist mucous membranes  Respiratory: normal respiratory effort, speaking in full sentences  CV: Extremities warm and well perfused, cap refill < 1 s  Abdominal: Soft, non-distended, non-tender to palpation, no rebound or guarding  Neurology: A&Ox3, nonfocal, JENNINGS x 4  Extremities: No edema  Skin: No rashes

## 2022-01-24 NOTE — ED PROVIDER NOTE - CARE PLAN
1 Principal Discharge DX:	Isabel-Moses tear   Principal Discharge DX:	2019 novel coronavirus disease (COVID-19)  Secondary Diagnosis:	Isabel-Moses tear

## 2022-01-24 NOTE — ED PROVIDER NOTE - PATIENT PORTAL LINK FT
You can access the FollowMyHealth Patient Portal offered by [facility] by registering at the following website: [email or web address]. By joining [facility]’s FollowMyHealth portal, you will also be able to view your health information using other applications (apps) compatible with our system.

## 2022-01-24 NOTE — ED PROVIDER NOTE - OBJECTIVE STATEMENT
30 yo F other wise healthy female p/w hematemesis. She had 2 episodes this AM of blood mixed with mucus. She denies any dizziness, blood in her stool, chest pain. She has been vomiting for 7d due to COVID19. She is worried that her vomiting could be due to a problem with her liver from when she had COVID in 2020. She denies eating any red food such as beets. 30 yo F other wise healthy female p/w scant hematemesis. She had 2 episodes this AM of small amount blood mixed with mucus when vomiting. She denies any dizziness, blood in her stool, chest pain. She has been vomiting for 7d due to COVID19 symptoms. Vomiting has been improving. She is worried that her vomiting could be due to a problem with her liver from when she had COVID in 2020. She denies eating any red food such as beets. No abdominal pain.

## 2022-01-24 NOTE — ED PROVIDER NOTE - CLINICAL SUMMARY MEDICAL DECISION MAKING FREE TEXT BOX
32 yo otherwise healthy F p/w hematemesis this AM. This is concerning for a rosalina sanford tear, gastritis. Pt has COVID and has been vomiting for 1 week with low PO intake. she has normal capillary refill and normal /56 in ED. will send pt out with zofran and famotidine. 32 yo otherwise healthy F p/w hematemesis this AM. This is concerning for a rosalina sanford tear, gastritis. Pt has COVID and has been vomiting for 1 week with low PO intake. she has normal capillary refill and normal /56 in ED. will send pt out with zofran and famotidine.    Ilda Perry MD - Attending Physician: Pt here with 2 episodes of scant hematemesis in setting of recurrent vomiting due to COVID. No abd pain, dizziness, large volume bleeding, fever, weakness. Very well appearing, nontender abd, VS wnl. Not concern for emergent bleeding. Zofran for vomiting, pepcid for gastritis vs malloryweiss. F/u outpatient

## 2022-01-24 NOTE — ED ADULT NURSE NOTE - OBJECTIVE STATEMENT
30yo F with no PMH presents to ED c/o 2 episodes of bloody vomit. Patient states that she tested positive for COVID on 1/20. States, "I woke up this morning and had 2 episodes of bloody vomit". States she called her doctor and told her to come to the ED. Endorses productive cough with green sputum. Endorses N/V, diarrhea, headache 8/10, did not take anything for the pain. Denies chest pain, SOB, dizziness, lightheadedness, fevers, bloody stools, hematuria, urinary frequency/urgency. A&O x4. Strong peripheral pulses. Lungs clear b/l on auscultation. Abdomen soft, nondistended, nontender. Stretcher in lowest position, side rails up. Call bell given and patient instructed to call RN if assistance is needed.

## 2022-08-16 NOTE — ED CLERICAL - DIVISION
Attempted to see pt for dc planning. Pt was sound asleep and couldn't stay awake long enough to carry on a conversation.   Will follow up in am.  Electronically signed by Catalino Cagle RN on 8/16/2022 at 4:14 PM [facility]...

## 2022-09-13 ENCOUNTER — LABORATORY RESULT (OUTPATIENT)
Age: 32
End: 2022-09-13

## 2022-09-13 ENCOUNTER — APPOINTMENT (OUTPATIENT)
Dept: OBGYN | Facility: CLINIC | Age: 32
End: 2022-09-13

## 2022-09-13 ENCOUNTER — OUTPATIENT (OUTPATIENT)
Dept: OUTPATIENT SERVICES | Facility: HOSPITAL | Age: 32
LOS: 1 days | End: 2022-09-13
Payer: MEDICAID

## 2022-09-13 VITALS — DIASTOLIC BLOOD PRESSURE: 62 MMHG | WEIGHT: 203 LBS | SYSTOLIC BLOOD PRESSURE: 100 MMHG | BODY MASS INDEX: 37.13 KG/M2

## 2022-09-13 DIAGNOSIS — N93.9 ABNORMAL UTERINE AND VAGINAL BLEEDING, UNSPECIFIED: ICD-10-CM

## 2022-09-13 DIAGNOSIS — N76.0 ACUTE VAGINITIS: ICD-10-CM

## 2022-09-13 DIAGNOSIS — Z80.8 FAMILY HISTORY OF MALIGNANT NEOPLASM OF OTHER ORGANS OR SYSTEMS: ICD-10-CM

## 2022-09-13 DIAGNOSIS — Z90.49 ACQUIRED ABSENCE OF OTHER SPECIFIED PARTS OF DIGESTIVE TRACT: Chronic | ICD-10-CM

## 2022-09-13 LAB
BASOPHILS # BLD AUTO: 0.04 K/UL — SIGNIFICANT CHANGE UP (ref 0–0.2)
BASOPHILS NFR BLD AUTO: 0.5 % — SIGNIFICANT CHANGE UP (ref 0–2)
EOSINOPHIL # BLD AUTO: 0.09 K/UL — SIGNIFICANT CHANGE UP (ref 0–0.5)
EOSINOPHIL NFR BLD AUTO: 1.2 % — SIGNIFICANT CHANGE UP (ref 0–6)
HCT VFR BLD CALC: 39.5 % — SIGNIFICANT CHANGE UP (ref 34.5–45)
HGB BLD-MCNC: 12.6 G/DL — SIGNIFICANT CHANGE UP (ref 11.5–15.5)
IMM GRANULOCYTES NFR BLD AUTO: 0.3 % — SIGNIFICANT CHANGE UP (ref 0–1.5)
LYMPHOCYTES # BLD AUTO: 2.52 K/UL — SIGNIFICANT CHANGE UP (ref 1–3.3)
LYMPHOCYTES # BLD AUTO: 33.3 % — SIGNIFICANT CHANGE UP (ref 13–44)
MCHC RBC-ENTMCNC: 26 PG — LOW (ref 27–34)
MCHC RBC-ENTMCNC: 31.9 GM/DL — LOW (ref 32–36)
MCV RBC AUTO: 81.4 FL — SIGNIFICANT CHANGE UP (ref 80–100)
MONOCYTES # BLD AUTO: 0.49 K/UL — SIGNIFICANT CHANGE UP (ref 0–0.9)
MONOCYTES NFR BLD AUTO: 6.5 % — SIGNIFICANT CHANGE UP (ref 2–14)
NEUTROPHILS # BLD AUTO: 4.4 K/UL — SIGNIFICANT CHANGE UP (ref 1.8–7.4)
NEUTROPHILS NFR BLD AUTO: 58.2 % — SIGNIFICANT CHANGE UP (ref 43–77)
PLATELET # BLD AUTO: 362 K/UL — SIGNIFICANT CHANGE UP (ref 150–400)
RBC # BLD: 4.85 M/UL — SIGNIFICANT CHANGE UP (ref 3.8–5.2)
RBC # FLD: 14.4 % — SIGNIFICANT CHANGE UP (ref 10.3–14.5)
WBC # BLD: 7.56 K/UL — SIGNIFICANT CHANGE UP (ref 3.8–10.5)
WBC # FLD AUTO: 7.56 K/UL — SIGNIFICANT CHANGE UP (ref 3.8–10.5)

## 2022-09-13 PROCEDURE — 87624 HPV HI-RISK TYP POOLED RSLT: CPT

## 2022-09-13 PROCEDURE — 36415 COLL VENOUS BLD VENIPUNCTURE: CPT

## 2022-09-13 PROCEDURE — 87491 CHLMYD TRACH DNA AMP PROBE: CPT

## 2022-09-13 PROCEDURE — 99213 OFFICE O/P EST LOW 20 MIN: CPT | Mod: GE

## 2022-09-13 PROCEDURE — 85025 COMPLETE CBC W/AUTO DIFF WBC: CPT

## 2022-09-13 PROCEDURE — G0463: CPT

## 2022-09-13 PROCEDURE — 87591 N.GONORRHOEAE DNA AMP PROB: CPT

## 2022-09-13 NOTE — REVIEW OF SYSTEMS
[SOB on Exertion] : shortness of breath on exertion [Incontinence] : incontinence [Abn Vaginal bleeding] : abnormal vaginal bleeding [Headache] : headache [Negative] : Heme/Lymph

## 2022-09-14 LAB
C TRACH RRNA SPEC QL NAA+PROBE: SIGNIFICANT CHANGE UP
HPV HIGH+LOW RISK DNA PNL CVX: SIGNIFICANT CHANGE UP
N GONORRHOEA RRNA SPEC QL NAA+PROBE: SIGNIFICANT CHANGE UP
SPECIMEN SOURCE: SIGNIFICANT CHANGE UP

## 2022-09-15 NOTE — PHYSICAL EXAM
[Appropriately responsive] : appropriately responsive [Alert] : alert [No Acute Distress] : no acute distress [Regular Rate Rhythm] : regular rate rhythm [No Murmurs] : no murmurs [Clear to Auscultation B/L] : clear to auscultation bilaterally [Soft] : soft [Non-tender] : non-tender [Non-distended] : non-distended [No Mass] : no mass [Oriented x3] : oriented x3 [Examination Of The Breasts] : a normal appearance [No Discharge] : no discharge [No Masses] : no breast masses were palpable [Labia Majora] : normal [Labia Minora] : normal [No Bleeding] : There was no active vaginal bleeding [Normal] : normal [Uterine Adnexae] : normal [FreeTextEntry3] : No leakage of urine on valsalva

## 2022-09-15 NOTE — HISTORY OF PRESENT ILLNESS
[Currently Active] : currently active [Patient reported PAP Smear was normal] : Patient reported PAP Smear was normal [FreeTextEntry1] : 33yo  (LMP 22) presenting for annual visit. She has had irregular menses for the past 8 months. She has her period 3x/month lasting 5 days each. She endorses heavy bleeding during her periods. Of note she has had the nexplanon since . She complains of intermittent SOB and mild sternal pressure. She denies lightheadedness and dizziness.\par \par She also complains of urinary incontinence during running, lifting heavy objects, and sex. She does not feel a strong urge to urinate before leakage of urine. \par \par She denies fever, chills, nausea, vomiting, dysuria, hematuria, vaginal itchiness, change in vaginal discharge, constipaiton, diarrhea. [Mammogramdate] : 0 [PapSmeardate] : 07/19 [TextBox_31] : 2017 ASCUS, HPV+ [FreeTextEntry2] : with

## 2022-09-15 NOTE — DISCUSSION/SUMMARY
[FreeTextEntry1] : 31yo  with abnormal uterine bleeding likely due to Nexplanon and stress urinary incontinence.\par \par #HCM\par - Pap smear with HPV cotesting performed\par \par #Contraception\par - Alternative BCM discussed\par - Pt would like to keep Nexplanon at this time\par \par #AUB\par - Likely due to Nexplanon\par - f/u CBC\par \par #Stress incontinence\par - Patient counseled on doing Kegel exercises 10x/day\par \par Princess Francis, PGY1\par d/w Dr. Alvarez\par

## 2022-09-19 LAB — CYTOLOGY SPEC DOC CYTO: SIGNIFICANT CHANGE UP

## 2022-09-21 DIAGNOSIS — N39.3 STRESS INCONTINENCE (FEMALE) (MALE): ICD-10-CM

## 2022-09-21 DIAGNOSIS — N93.9 ABNORMAL UTERINE AND VAGINAL BLEEDING, UNSPECIFIED: ICD-10-CM

## 2022-11-10 NOTE — DISCHARGE NOTE OB - HOME CARE AGENCY CONTACT NUMBER:
11/10/22-EDC 11/23/22-38+1-DFM-EFM applied for +-denies SAROM/bleeding/ctx-FM felt x 1 possibly 0100 but decreased fetal movement since 1600.  0922-Debbie ROBERTS updated-ALEJANDRA [phone]

## 2022-12-26 ENCOUNTER — EMERGENCY (EMERGENCY)
Facility: HOSPITAL | Age: 32
LOS: 1 days | Discharge: HOME | End: 2022-12-26
Attending: EMERGENCY MEDICINE
Payer: MEDICAID

## 2022-12-26 VITALS
SYSTOLIC BLOOD PRESSURE: 109 MMHG | RESPIRATION RATE: 16 BRPM | DIASTOLIC BLOOD PRESSURE: 65 MMHG | OXYGEN SATURATION: 98 % | HEIGHT: 62 IN | WEIGHT: 175.05 LBS | HEART RATE: 67 BPM | TEMPERATURE: 98 F

## 2022-12-26 DIAGNOSIS — Z90.49 ACQUIRED ABSENCE OF OTHER SPECIFIED PARTS OF DIGESTIVE TRACT: Chronic | ICD-10-CM

## 2022-12-26 LAB
ALBUMIN SERPL ELPH-MCNC: 4.6 G/DL — SIGNIFICANT CHANGE UP (ref 3.3–5)
ALP SERPL-CCNC: 90 U/L — SIGNIFICANT CHANGE UP (ref 40–120)
ALT FLD-CCNC: 11 U/L — SIGNIFICANT CHANGE UP (ref 10–45)
ANION GAP SERPL CALC-SCNC: 10 MMOL/L — SIGNIFICANT CHANGE UP (ref 5–17)
AST SERPL-CCNC: 14 U/L — SIGNIFICANT CHANGE UP (ref 10–40)
BASOPHILS # BLD AUTO: 0.05 K/UL — SIGNIFICANT CHANGE UP (ref 0–0.2)
BASOPHILS NFR BLD AUTO: 0.5 % — SIGNIFICANT CHANGE UP (ref 0–2)
BILIRUB SERPL-MCNC: 0.3 MG/DL — SIGNIFICANT CHANGE UP (ref 0.2–1.2)
BUN SERPL-MCNC: 10 MG/DL — SIGNIFICANT CHANGE UP (ref 7–23)
CALCIUM SERPL-MCNC: 9.3 MG/DL — SIGNIFICANT CHANGE UP (ref 8.4–10.5)
CHLORIDE SERPL-SCNC: 103 MMOL/L — SIGNIFICANT CHANGE UP (ref 96–108)
CO2 SERPL-SCNC: 24 MMOL/L — SIGNIFICANT CHANGE UP (ref 22–31)
CREAT SERPL-MCNC: 0.54 MG/DL — SIGNIFICANT CHANGE UP (ref 0.5–1.3)
EGFR: 127 ML/MIN/1.73M2 — SIGNIFICANT CHANGE UP
EOSINOPHIL # BLD AUTO: 0.11 K/UL — SIGNIFICANT CHANGE UP (ref 0–0.5)
EOSINOPHIL NFR BLD AUTO: 1.1 % — SIGNIFICANT CHANGE UP (ref 0–6)
GLUCOSE SERPL-MCNC: 112 MG/DL — HIGH (ref 70–99)
HCT VFR BLD CALC: 37.3 % — SIGNIFICANT CHANGE UP (ref 34.5–45)
HGB BLD-MCNC: 12.2 G/DL — SIGNIFICANT CHANGE UP (ref 11.5–15.5)
IMM GRANULOCYTES NFR BLD AUTO: 0.4 % — SIGNIFICANT CHANGE UP (ref 0–0.9)
LIDOCAIN IGE QN: 22 U/L — SIGNIFICANT CHANGE UP (ref 7–60)
LYMPHOCYTES # BLD AUTO: 3.39 K/UL — HIGH (ref 1–3.3)
LYMPHOCYTES # BLD AUTO: 33.6 % — SIGNIFICANT CHANGE UP (ref 13–44)
MCHC RBC-ENTMCNC: 26.5 PG — LOW (ref 27–34)
MCHC RBC-ENTMCNC: 32.7 GM/DL — SIGNIFICANT CHANGE UP (ref 32–36)
MCV RBC AUTO: 80.9 FL — SIGNIFICANT CHANGE UP (ref 80–100)
MONOCYTES # BLD AUTO: 0.58 K/UL — SIGNIFICANT CHANGE UP (ref 0–0.9)
MONOCYTES NFR BLD AUTO: 5.8 % — SIGNIFICANT CHANGE UP (ref 2–14)
NEUTROPHILS # BLD AUTO: 5.91 K/UL — SIGNIFICANT CHANGE UP (ref 1.8–7.4)
NEUTROPHILS NFR BLD AUTO: 58.6 % — SIGNIFICANT CHANGE UP (ref 43–77)
NRBC # BLD: 0 /100 WBCS — SIGNIFICANT CHANGE UP (ref 0–0)
PLATELET # BLD AUTO: 304 K/UL — SIGNIFICANT CHANGE UP (ref 150–400)
POTASSIUM SERPL-MCNC: 3.9 MMOL/L — SIGNIFICANT CHANGE UP (ref 3.5–5.3)
POTASSIUM SERPL-SCNC: 3.9 MMOL/L — SIGNIFICANT CHANGE UP (ref 3.5–5.3)
PROT SERPL-MCNC: 8.1 G/DL — SIGNIFICANT CHANGE UP (ref 6–8.3)
RBC # BLD: 4.61 M/UL — SIGNIFICANT CHANGE UP (ref 3.8–5.2)
RBC # FLD: 13.5 % — SIGNIFICANT CHANGE UP (ref 10.3–14.5)
SARS-COV-2 RNA SPEC QL NAA+PROBE: SIGNIFICANT CHANGE UP
SODIUM SERPL-SCNC: 137 MMOL/L — SIGNIFICANT CHANGE UP (ref 135–145)
WBC # BLD: 10.08 K/UL — SIGNIFICANT CHANGE UP (ref 3.8–10.5)
WBC # FLD AUTO: 10.08 K/UL — SIGNIFICANT CHANGE UP (ref 3.8–10.5)

## 2022-12-26 PROCEDURE — 99284 EMERGENCY DEPT VISIT MOD MDM: CPT

## 2022-12-26 RX ORDER — FAMOTIDINE 10 MG/ML
20 INJECTION INTRAVENOUS ONCE
Refills: 0 | Status: COMPLETED | OUTPATIENT
Start: 2022-12-26 | End: 2022-12-26

## 2022-12-26 RX ORDER — SODIUM CHLORIDE 9 MG/ML
1000 INJECTION INTRAMUSCULAR; INTRAVENOUS; SUBCUTANEOUS ONCE
Refills: 0 | Status: COMPLETED | OUTPATIENT
Start: 2022-12-26 | End: 2022-12-26

## 2022-12-26 RX ADMIN — Medication 30 MILLILITER(S): at 22:08

## 2022-12-26 RX ADMIN — SODIUM CHLORIDE 1000 MILLILITER(S): 9 INJECTION INTRAMUSCULAR; INTRAVENOUS; SUBCUTANEOUS at 22:10

## 2022-12-26 RX ADMIN — FAMOTIDINE 20 MILLIGRAM(S): 10 INJECTION INTRAVENOUS at 22:09

## 2022-12-26 NOTE — ED PROVIDER NOTE - PHYSICAL EXAMINATION
Gen: WNWD NAD  HEENT: NCAT EOMI normal pharynx  Neck: supple  CV: RRR, no murmur  Lung: CTA BL  Abd: +BS soft ND epigastric LUQ TTP no grr no cvat   Ext: wwp, palp pulses, FROMx4, no cce  Neuro: CN grossly intact, sensation intact, motor 5/5 throughout

## 2022-12-26 NOTE — ED PROVIDER NOTE - OBJECTIVE STATEMENT
32F hx of cholecystectomy here with c/o intermittent epigastric abd pain radiating to back assoc with NVD for past 4 days. Worse w eating. NO fevers. No Uri sx. No sick contacts. No recent travel. Tolerated liquids today w/o vomiting. Still having multiple liquidy BMs today, reporst w everything she eats. Vomitus and stools NBNB. Has not taken anything for sx.

## 2022-12-26 NOTE — ED PROVIDER NOTE - CLINICAL SUMMARY MEDICAL DECISION MAKING FREE TEXT BOX
32F hx of cholecystectomy here with c/o epigastric abd pain radiating to back assoc with NVD for past 4 days. NO fevers. No Uri sx. No sick contacts. Tolerated liquids today w/o vomiting. Still having multiple liquidy BMs today NB. VS WNL. epigastric TTP, non peritoneal, no skin rashes. Likely acute gastroenteritis, COVID,  pancreatitis, less likely surgical process. Check labs, meds, re-eval.

## 2022-12-26 NOTE — ED PROVIDER NOTE - PATIENT PORTAL LINK FT
You can access the FollowMyHealth Patient Portal offered by [facility] by registering at the following website: [email or web address]. By joining [email or web address]’s FollowMyHealth portal, you will also be able to view your health information using other applications (apps) compatible with our system.

## 2022-12-26 NOTE — ED PROVIDER NOTE - NS_EDPROVIDERDISPOUSERTYPE_ED_A_ED
seth Attending Attestation (For Attendings USE Only)...

## 2022-12-26 NOTE — ED ADULT NURSE NOTE - OBJECTIVE STATEMENT
Pt presents for eval of upper right and left quadrant crampy abd pain x 3 days, accompanied by n/v, but she is able to tolerate po fluids.  Oral mucosa moist.  Skin warm and d ry, color good.  No sick contacts.

## 2022-12-27 VITALS
RESPIRATION RATE: 16 BRPM | DIASTOLIC BLOOD PRESSURE: 73 MMHG | TEMPERATURE: 98 F | SYSTOLIC BLOOD PRESSURE: 107 MMHG | HEART RATE: 66 BPM | OXYGEN SATURATION: 99 %

## 2022-12-27 LAB
APPEARANCE UR: CLEAR — SIGNIFICANT CHANGE UP
BACTERIA # UR AUTO: ABNORMAL
BILIRUB UR-MCNC: NEGATIVE — SIGNIFICANT CHANGE UP
COLOR SPEC: SIGNIFICANT CHANGE UP
DIFF PNL FLD: ABNORMAL
EPI CELLS # UR: 3 /HPF — SIGNIFICANT CHANGE UP
GLUCOSE UR QL: NEGATIVE — SIGNIFICANT CHANGE UP
HCG UR QL: NEGATIVE — SIGNIFICANT CHANGE UP
HYALINE CASTS # UR AUTO: 1 /LPF — SIGNIFICANT CHANGE UP (ref 0–2)
KETONES UR-MCNC: NEGATIVE — SIGNIFICANT CHANGE UP
LEUKOCYTE ESTERASE UR-ACNC: NEGATIVE — SIGNIFICANT CHANGE UP
NITRITE UR-MCNC: NEGATIVE — SIGNIFICANT CHANGE UP
PH UR: 5 — SIGNIFICANT CHANGE UP (ref 5–8)
PROT UR-MCNC: NEGATIVE — SIGNIFICANT CHANGE UP
RBC CASTS # UR COMP ASSIST: 1 /HPF — SIGNIFICANT CHANGE UP (ref 0–4)
SP GR SPEC: 1.01 — SIGNIFICANT CHANGE UP (ref 1.01–1.02)
UROBILINOGEN FLD QL: NEGATIVE — SIGNIFICANT CHANGE UP
WBC UR QL: 7 /HPF — HIGH (ref 0–5)

## 2022-12-27 PROCEDURE — 80053 COMPREHEN METABOLIC PANEL: CPT

## 2022-12-27 PROCEDURE — 81025 URINE PREGNANCY TEST: CPT

## 2022-12-27 PROCEDURE — 87635 SARS-COV-2 COVID-19 AMP PRB: CPT

## 2022-12-27 PROCEDURE — 99284 EMERGENCY DEPT VISIT MOD MDM: CPT

## 2022-12-27 PROCEDURE — 83690 ASSAY OF LIPASE: CPT

## 2022-12-27 PROCEDURE — 81001 URINALYSIS AUTO W/SCOPE: CPT

## 2022-12-27 PROCEDURE — 85025 COMPLETE CBC W/AUTO DIFF WBC: CPT

## 2022-12-27 PROCEDURE — 84702 CHORIONIC GONADOTROPIN TEST: CPT

## 2022-12-27 RX ORDER — SODIUM CHLORIDE 9 MG/ML
1000 INJECTION INTRAMUSCULAR; INTRAVENOUS; SUBCUTANEOUS ONCE
Refills: 0 | Status: COMPLETED | OUTPATIENT
Start: 2022-12-27 | End: 2022-12-27

## 2022-12-27 RX ORDER — FAMOTIDINE 10 MG/ML
1 INJECTION INTRAVENOUS
Qty: 14 | Refills: 0
Start: 2022-12-27

## 2022-12-27 RX ORDER — ONDANSETRON 8 MG/1
1 TABLET, FILM COATED ORAL
Qty: 12 | Refills: 0
Start: 2022-12-27

## 2022-12-27 RX ORDER — ONDANSETRON 8 MG/1
4 TABLET, FILM COATED ORAL ONCE
Refills: 0 | Status: COMPLETED | OUTPATIENT
Start: 2022-12-27 | End: 2022-12-27

## 2022-12-27 RX ADMIN — SODIUM CHLORIDE 1000 MILLILITER(S): 9 INJECTION INTRAMUSCULAR; INTRAVENOUS; SUBCUTANEOUS at 01:00

## 2022-12-27 NOTE — ED ADULT NURSE REASSESSMENT NOTE - NS ED NURSE REASSESS COMMENT FT1
Pt able to tolerate PO, denies nausea or vomiting. Pt able to eat crackers and apple juice.
Report taken from RN Pat. Pt introduced to oncoming RN and updated on plan of care. A&Ox4, breathing unlabored, resting in bed while conversing with RN. Pt states she feels slightly dizzy. Call bell in reach pt educated on use, bed in lowest position.
Pt reminded to give a urine sample

## 2023-05-31 ENCOUNTER — APPOINTMENT (OUTPATIENT)
Dept: OBGYN | Facility: CLINIC | Age: 33
End: 2023-05-31
Payer: MEDICAID

## 2023-05-31 ENCOUNTER — OUTPATIENT (OUTPATIENT)
Dept: OUTPATIENT SERVICES | Facility: HOSPITAL | Age: 33
LOS: 1 days | End: 2023-05-31
Payer: MEDICAID

## 2023-05-31 DIAGNOSIS — Z90.49 ACQUIRED ABSENCE OF OTHER SPECIFIED PARTS OF DIGESTIVE TRACT: Chronic | ICD-10-CM

## 2023-05-31 DIAGNOSIS — N76.0 ACUTE VAGINITIS: ICD-10-CM

## 2023-05-31 PROCEDURE — G0463: CPT

## 2023-05-31 PROCEDURE — 99213 OFFICE O/P EST LOW 20 MIN: CPT | Mod: GC,25

## 2023-05-31 PROCEDURE — 11982 REMOVE DRUG IMPLANT DEVICE: CPT

## 2023-05-31 PROCEDURE — 11982 REMOVE DRUG IMPLANT DEVICE: CPT | Mod: NC,GC

## 2023-05-31 NOTE — HISTORY OF PRESENT ILLNESS
[FreeTextEntry1] : Patient is a 33yo  seen in clinic for Nexplanon removal\par \par had no problems, now has had about 3 months without period\par \par tried Depo before\par had IUD with bleeding\par \par no HTN\par Migraines -  2 weeks\par \par

## 2023-05-31 NOTE — REASON FOR VISIT
[Follow-Up] : a follow-up evaluation of [Abnormal Uterine Bleeding] : abnormal uterine bleeding [FreeTextEntry2] : Nexplanon removal,

## 2023-05-31 NOTE — HISTORY OF PRESENT ILLNESS
[FreeTextEntry1] : Patient is a 31yo  seen in clinic for Nexplanon removal\par \par had no problems, now has had about 3 months without period\par \par tried Depo before\par had IUD with bleeding\par \par no HTN\par Migraines -  2 weeks\par \par

## 2023-06-01 NOTE — PLAN
[FreeTextEntry1] : Patient is a 31yo  here for Nexplanon removal for AUB.\par \par #Contraception\par - Nexplanon removed with no issues\par - Counseled on other options, patient has already tried IUD, Depo and would like to try POPs now. Desires future fertility and does not want salpingectomy at this time.\par - Norethindrone 0.35 sent to patient pharmacy. Instructed to take at the same time every day and use condoms for the first month on the pill. Counseled that it won't be as effective and she may have bleeding if she doesn't take it at same time every day. Patient is aware and would be ok if she became pregnant.\par - RTC in September for annual appointment\par \par L Panella PGY1\par patient seen and d/w Dr. Orozco

## 2023-06-01 NOTE — PLAN
[FreeTextEntry1] : Patient is a 33yo  here for Nexplanon removal for AUB.\par \par #Contraception\par - Nexplanon removed with no issues\par - Counseled on other options, patient has already tried IUD, Depo and would like to try POPs now. Desires future fertility and does not want salpingectomy at this time.\par - Norethindrone 0.35 sent to patient pharmacy. Instructed to take at the same time every day and use condoms for the first month on the pill. Counseled that it won't be as effective and she may have bleeding if she doesn't take it at same time every day. Patient is aware and would be ok if she became pregnant.\par - RTC in September for annual appointment\par \par L Panella PGY1\par patient seen and d/w Dr. Orozco

## 2023-06-05 NOTE — ED ADULT NURSE NOTE - NSICDXPASTMEDICALHX_GEN_ALL_CORE_FT
PAST MEDICAL HISTORY:  PID (pelvic inflammatory disease)     Urinary tract infection      Simple: Patient demonstrates quick and easy understanding

## 2023-06-07 DIAGNOSIS — Z30.46 ENCOUNTER FOR SURVEILLANCE OF IMPLANTABLE SUBDERMAL CONTRACEPTIVE: ICD-10-CM

## 2023-06-08 NOTE — PROGRESS NOTE ADULT - PROBLEM SELECTOR PROBLEM 3
Motivated
22 weeks gestation of pregnancy
22 weeks gestation of pregnancy
Dysuria during pregnancy

## 2023-06-29 NOTE — PROGRESS NOTE ADULT - PROBLEM/PLAN-3
DISPLAY PLAN FREE TEXT
0 (no pain/absence of nonverbal indicators of pain)

## 2023-07-03 NOTE — H&P ADULT. - CONSTITUTIONAL DETAILS
Otezla Pregnancy And Lactation Text: This medication is Pregnancy Category C and it isn't known if it is safe during pregnancy. It is unknown if it is excreted in breast milk. no distress

## 2023-07-24 ENCOUNTER — APPOINTMENT (OUTPATIENT)
Dept: OBGYN | Facility: CLINIC | Age: 33
End: 2023-07-24
Payer: MEDICAID

## 2023-07-24 ENCOUNTER — LABORATORY RESULT (OUTPATIENT)
Age: 33
End: 2023-07-24

## 2023-07-24 ENCOUNTER — RESULT CHARGE (OUTPATIENT)
Age: 33
End: 2023-07-24

## 2023-07-24 ENCOUNTER — OUTPATIENT (OUTPATIENT)
Dept: OUTPATIENT SERVICES | Facility: HOSPITAL | Age: 33
LOS: 1 days | End: 2023-07-24
Payer: MEDICAID

## 2023-07-24 VITALS — BODY MASS INDEX: 38.59 KG/M2 | SYSTOLIC BLOOD PRESSURE: 112 MMHG | WEIGHT: 211 LBS | DIASTOLIC BLOOD PRESSURE: 70 MMHG

## 2023-07-24 DIAGNOSIS — Z90.49 ACQUIRED ABSENCE OF OTHER SPECIFIED PARTS OF DIGESTIVE TRACT: Chronic | ICD-10-CM

## 2023-07-24 DIAGNOSIS — N76.0 ACUTE VAGINITIS: ICD-10-CM

## 2023-07-24 LAB
FSH SERPL-MCNC: 1.3 IU/L — SIGNIFICANT CHANGE UP
HCG UR QL: NEGATIVE
PROLACTIN SERPL-MCNC: 9.3 NG/ML — SIGNIFICANT CHANGE UP (ref 3.4–24.1)
T4 FREE+ TSH PNL SERPL: 0.33 UIU/ML — SIGNIFICANT CHANGE UP (ref 0.27–4.2)

## 2023-07-24 PROCEDURE — 99214 OFFICE O/P EST MOD 30 MIN: CPT | Mod: 25

## 2023-07-24 PROCEDURE — 84146 ASSAY OF PROLACTIN: CPT

## 2023-07-24 PROCEDURE — 84443 ASSAY THYROID STIM HORMONE: CPT

## 2023-07-24 PROCEDURE — 36415 COLL VENOUS BLD VENIPUNCTURE: CPT

## 2023-07-24 PROCEDURE — 58300 INSERT INTRAUTERINE DEVICE: CPT | Mod: NC

## 2023-07-24 PROCEDURE — G0463: CPT

## 2023-07-24 PROCEDURE — 81025 URINE PREGNANCY TEST: CPT

## 2023-07-24 PROCEDURE — 83001 ASSAY OF GONADOTROPIN (FSH): CPT

## 2023-07-24 PROCEDURE — 58300 INSERT INTRAUTERINE DEVICE: CPT

## 2023-07-24 NOTE — PROCEDURE
[IUD Placement] : intrauterine device (IUD) placement [Prevention of Pregnancy] : prevention of pregnancy [Risks] : risks [Benefits] : benefits [Alternatives] : alternatives [Patient] : patient [Infection] : infection [Bleeding] : bleeding [Pain] : pain [Expulsion] : expulsion [Failure] : failure [Uterine Perforation] : uterine perforation [CONSENT OBTAINED] : written consent was obtained prior to the procedure. [Neg Pregnancy Test] : a pregnancy test was negative [Betadine] : Prepped with Betadine [Tenaculum] : a single toothed tenaculum [Easy Passage] : allowed easy passage of a uterine sound without dilation [Mirena IUD] : The Mirena IUD was inserted past the internal cervical os. The IUD was then gently inserted upwards toward the fundus.  The IUD strings were cut to an appropriate length. [Lot Number: ___] : IUD lot number: [unfilled] [Tolerated Well] : the patient tolerated the procedure well [No Complications] : there were no complications [None] : no post-procedure medications given

## 2023-08-07 DIAGNOSIS — Z30.430 ENCOUNTER FOR INSERTION OF INTRAUTERINE CONTRACEPTIVE DEVICE: ICD-10-CM

## 2023-08-07 DIAGNOSIS — N91.1 SECONDARY AMENORRHEA: ICD-10-CM

## 2023-10-09 NOTE — ED PROVIDER NOTE - NSCAREINITIATED _GEN_ER
Vitals WNL    I have personally reviewed the review of systems (ROS) and past, family and social histories (PFSH) documented above by the resident.  I have reviewed the care furnished by the resident during the encounter, including a review of the patient's medical history, the resident's findings on physical examination, diagnosis, and the treatment plan.  I participated in the management of the patient and was immediately available throughout the encounter.   I was physically present during all key portions of the service(s) provided with the resident.  Services were furnished in a primary care center located in the outpatient department of a [facility].   Shen Garner(Resident)
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
## 2023-12-05 ENCOUNTER — APPOINTMENT (OUTPATIENT)
Dept: OBGYN | Facility: CLINIC | Age: 33
End: 2023-12-05
Payer: SELF-PAY

## 2023-12-05 ENCOUNTER — OUTPATIENT (OUTPATIENT)
Dept: OUTPATIENT SERVICES | Facility: HOSPITAL | Age: 33
LOS: 1 days | End: 2023-12-05
Payer: SELF-PAY

## 2023-12-05 VITALS — DIASTOLIC BLOOD PRESSURE: 84 MMHG | WEIGHT: 211 LBS | SYSTOLIC BLOOD PRESSURE: 124 MMHG | BODY MASS INDEX: 38.59 KG/M2

## 2023-12-05 DIAGNOSIS — Z90.49 ACQUIRED ABSENCE OF OTHER SPECIFIED PARTS OF DIGESTIVE TRACT: Chronic | ICD-10-CM

## 2023-12-05 DIAGNOSIS — N76.0 ACUTE VAGINITIS: ICD-10-CM

## 2023-12-05 PROCEDURE — 81025 URINE PREGNANCY TEST: CPT

## 2023-12-05 PROCEDURE — G0463: CPT

## 2023-12-05 PROCEDURE — 99213 OFFICE O/P EST LOW 20 MIN: CPT

## 2023-12-20 DIAGNOSIS — N92.1 EXCESSIVE AND FREQUENT MENSTRUATION WITH IRREGULAR CYCLE: ICD-10-CM

## 2023-12-20 DIAGNOSIS — N64.4 MASTODYNIA: ICD-10-CM

## 2024-01-07 NOTE — ED ADULT NURSE NOTE - BREATHING, MLM
Care Due:                  Date            Visit Type   Department     Provider  --------------------------------------------------------------------------------                                EP -                              PRIMARY      [facility] INTERNAL  Last Visit: 12-      CARE ([facility])   JOSE MARIA Gregory                              EP -                              PRIMARY      [facility] INTERNAL  Next Visit: 06-      CARE ([facility])   MEDICINE       Tacos Gregory                                                            Last  Test          Frequency    Reason                     Performed    Due Date  --------------------------------------------------------------------------------    Uric Acid...  12 months..  colchicine...............  Not Found    Overdue    [employer] Embedded Care Due Messages. Reference number: [id number].   1/06/2024 4:47:56 AM CST  
Refill Routing Note   Medication(s) are not appropriate for processing by [facility] for the following reason(s):        Required labs abnormal    [facility] action(s):  Defer     Requires labs : Yes             Appointments  past 12m or future 3m with PCP    Date Provider   Last Visit   12/4/2023 Tacos Gregory Jr., MD   Next Visit   6/4/2024 Tacos Gregory Jr., MD   ED visits in past 90 days: 0        Note composed:12:20 PM 01/07/2024           
Spontaneous, unlabored and symmetrical

## 2024-01-30 ENCOUNTER — OUTPATIENT (OUTPATIENT)
Dept: OUTPATIENT SERVICES | Facility: HOSPITAL | Age: 34
LOS: 1 days | End: 2024-01-30
Payer: SELF-PAY

## 2024-01-30 ENCOUNTER — APPOINTMENT (OUTPATIENT)
Dept: OBGYN | Facility: CLINIC | Age: 34
End: 2024-01-30
Payer: SELF-PAY

## 2024-01-30 VITALS — WEIGHT: 224 LBS | BODY MASS INDEX: 40.97 KG/M2 | SYSTOLIC BLOOD PRESSURE: 160 MMHG | DIASTOLIC BLOOD PRESSURE: 100 MMHG

## 2024-01-30 DIAGNOSIS — Z90.49 ACQUIRED ABSENCE OF OTHER SPECIFIED PARTS OF DIGESTIVE TRACT: Chronic | ICD-10-CM

## 2024-01-30 DIAGNOSIS — N76.0 ACUTE VAGINITIS: ICD-10-CM

## 2024-01-30 PROCEDURE — 81025 URINE PREGNANCY TEST: CPT

## 2024-01-30 PROCEDURE — 58301 REMOVE INTRAUTERINE DEVICE: CPT

## 2024-01-30 NOTE — PLAN
[FreeTextEntry1] : 34yo - here for IUD removal, desires pregnancy.  - pt has appt w/ medicine for full physical/ screening bloodwork before trying to get pregnant  RTC for benji/prn or w/ pos preg test [place], PAC

## 2024-01-30 NOTE — PROCEDURE
[IUD Removal] : intrauterine device (IUD) removal [Time out performed] : Pre-procedure time out performed.  Patient's name, date of birth and procedure confirmed. [Consent Obtained] : Consent obtained [Fertility Desired] : fertility desired [Risks] : risks [Benefits] : benefits [Patient] : patient [Speculum Placed] : speculum placed [IUD Removed - Forceps] : IUD removed - forceps [IUD Discarded] : IUD discarded [Tolerated Well] : Patient tolerated the procedure well

## 2024-01-31 DIAGNOSIS — Z30.432 ENCOUNTER FOR REMOVAL OF INTRAUTERINE CONTRACEPTIVE DEVICE: ICD-10-CM

## 2024-02-21 ENCOUNTER — EMERGENCY (EMERGENCY)
Facility: HOSPITAL | Age: 34
LOS: 1 days | Discharge: HOME | End: 2024-02-21
Attending: EMERGENCY MEDICINE
Payer: MEDICAID

## 2024-02-21 VITALS
TEMPERATURE: 98 F | SYSTOLIC BLOOD PRESSURE: 107 MMHG | OXYGEN SATURATION: 100 % | DIASTOLIC BLOOD PRESSURE: 68 MMHG | RESPIRATION RATE: 18 BRPM | HEIGHT: 61 IN | WEIGHT: 190.04 LBS | HEART RATE: 79 BPM

## 2024-02-21 VITALS
RESPIRATION RATE: 16 BRPM | DIASTOLIC BLOOD PRESSURE: 70 MMHG | OXYGEN SATURATION: 98 % | TEMPERATURE: 98 F | HEART RATE: 75 BPM | SYSTOLIC BLOOD PRESSURE: 115 MMHG

## 2024-02-21 DIAGNOSIS — Z90.49 ACQUIRED ABSENCE OF OTHER SPECIFIED PARTS OF DIGESTIVE TRACT: Chronic | ICD-10-CM

## 2024-02-21 PROCEDURE — 72220 X-RAY EXAM SACRUM TAILBONE: CPT | Mod: 26

## 2024-02-21 PROCEDURE — 99283 EMERGENCY DEPT VISIT LOW MDM: CPT | Mod: 25

## 2024-02-21 PROCEDURE — 99284 EMERGENCY DEPT VISIT MOD MDM: CPT

## 2024-02-21 PROCEDURE — 72220 X-RAY EXAM SACRUM TAILBONE: CPT

## 2024-02-21 RX ORDER — DIAZEPAM 5 MG
5 TABLET ORAL ONCE
Refills: 0 | Status: DISCONTINUED | OUTPATIENT
Start: 2024-02-21 | End: 2024-02-21

## 2024-02-21 RX ORDER — IBUPROFEN 200 MG
600 TABLET ORAL ONCE
Refills: 0 | Status: COMPLETED | OUTPATIENT
Start: 2024-02-21 | End: 2024-02-21

## 2024-02-21 RX ORDER — LIDOCAINE 4 G/100G
1 CREAM TOPICAL ONCE
Refills: 0 | Status: COMPLETED | OUTPATIENT
Start: 2024-02-21 | End: 2024-02-21

## 2024-02-21 RX ADMIN — Medication 600 MILLIGRAM(S): at 21:49

## 2024-02-21 RX ADMIN — LIDOCAINE 1 PATCH: 4 CREAM TOPICAL at 21:49

## 2024-02-21 NOTE — ED PROVIDER NOTE - PATIENT PORTAL LINK FT
You can access the FollowMyHealth Patient Portal offered by [facility] by registering at the following website: [email or web address]. By joining [facility] [employer]’s FollowMyHealth portal, you will also be able to view your health information using other applications (apps) compatible with our system.

## 2024-02-21 NOTE — ED ADULT TRIAGE NOTE - CHIEF COMPLAINT QUOTE
pt reports she fell 3 days ago, states she slipped down 7 steps on her buttock and hit her head on the railing but denies LOC or headache  now endorsing lower back pain

## 2024-02-21 NOTE — ED PROVIDER NOTE - OBJECTIVE STATEMENT
History of Present Illness
 
General
Chief Complaint: Psychiatric Related Complaint
Stated Complaint: PSY EVAL, -SI/HI
Source: patient
Exam Limitations: no limitations
 
Vital Signs & Intake/Output
Vital Signs & Intake/Output
 Vital Signs
 
 
Date Time Temp Pulse Resp B/P B/P Pulse O2 O2 Flow FiO2
 
     Mean Ox Delivery Rate 
 
2231 98.2 75 16 119/80  97 Room Air  
 
2004 96.5 65 18 141/86  96 Room Air  
 
 
 
Allergies
Coded Allergies:
No Known Allergies (18)
 
Triage Nurses Notes Reviewed? yes
Onset: Gradual
Duration: week(s):, waxing and waning
Timing: recent history
Severity: mild, moderate
Associated Symptoms: anxiety
HPI:
51 yo gentleman
h/o alcohol abuse
presents seeking psychiatric help.
 
"I am so stressed out.... My dad  of prostate cancer... I was his primary 
caregiver... and now I care for my mom with alzheimer's disease.... It's just so
hard... I have no professional life...."
 
He denies SI/HI, reports recurrent drinking which he does not quantify, denies 
drug use.
 
"I just need someone to talk to.... tonight, I got really stressed out."
 
Past History
 
Travel History
Traveled to [place] past 21 day No
 
Medical History
Any Pertinent Medical History? see below for history
 
Surgical History
Surgical History: none
 
Psychosocial History
What is your primary language English
 
Family History
Hx Contributory? No
 
Review of Systems
 
Review of Systems
Constitutional:
Reports: no symptoms. 
EENTM:
Reports: no symptoms. 
Respiratory:
Reports: no symptoms. 
Cardiovascular:
Reports: no symptoms. 
GI:
Reports: no symptoms. 
Genitourinary:
Reports: no symptoms. 
Musculoskeletal:
Reports: no symptoms. 
Skin:
Reports: no symptoms. 
Neurological/Psychological:
Reports: no symptoms. 
Hematologic/Endocrine:
Reports: no symptoms. 
Immunologic/Allergic:
Reports: no symptoms. 
All Other Systems: Reviewed and Negative
 
Physical Exam
 
Physical Exam
General Appearance: well developed/nourished, mild distress
Head: atraumatic
Eyes:
Bilateral: normal appearance. 
Ears, Nose, Throat: normal pharynx, normal ENT inspection, hearing grossly 
normal
Neck: normal inspection, supple
Respiratory: normal breath sounds
Cardiovascular: regular rate/rhythm
Gastrointestinal: soft, non-tender
Extremities: normal range of motion
Neurological/Psychiatric: no motor/sensory deficits, awake, alert, anxious, 
oriented x 3, tearful
Appearance/Memory/Insight: appropriate appearance, appropriate insight
Behavoir/Eye Contact/Speech: cooperative, tearful
Thoughts/Hallucinations: no apparent hallucination
Skin: intact, normal color, warm/dry
SAD PERSONS Done? patient not suicidal
 
Progress
Differential Diagnosis: alcohol abuse, depression vs other. 
Plan of Care:
 Orders
 
 
Procedure Date/time Status
 
Regular Diet  B Active
 
URINE DRUG SCREEN FOR ER ONLY 1954 Active
 
ETHANOL 1954 Complete
 
COMPREHENSIVE METABOLIC PANEL 1954 Complete
 
CBC WITHOUT DIFFERENTIAL 1954 Complete
 
ED CRISIS PSYCH CONSULT 1954 Active
 
 
 Laboratory Tests
 
 
 
18:
Anion Gap 15, Estimated GFR > 60, BUN/Creatinine Ratio 11.4, Glucose 101  H, 
Calcium 9.1, Total Bilirubin 0.9, AST 29, ALT 43, Alkaline Phosphatase 80, Total
Protein 6.9, Albumin 4.3, Globulin 2.6, Albumin/Globulin Ratio 1.7, CBC w Diff 
NO MAN DIFF REQ, RBC 4.93, MCV 87.2, MCH 28.9, MCHC 33.1, RDW 13.9, MPV 8.3, 
Gran % 62.0, Lymphocytes % 30.3, Monocytes % 6.1, Eosinophils % 1.1, Basophils %
0.5, Absolute Granulocytes 5.3, Absolute Lymphocytes 2.6, Absolute Monocytes 0.5
, Absolute Eosinophils 0.1, Absolute Basophils 0, Serum Alcohol 221.0
 
 
Departure
 
Departure
Disposition: HOME OR SELF CARE
Condition: Stable
Clinical Impression
Primary Impression: Alcohol intoxication
Secondary Impressions: Stress
Referrals:
Patient Has No Primary Care Dr
 
Departure Forms:
Customer Survey
General Discharge Information
Comments
18, 22:42... pt wishes to leave.... he does not wish to see psyche.  he 
denies SI/HI and is going home in company of his wife.
Attending note.  Patient was seen in room #33 to the right.  Patient fell down some steps at home this past Sunday and is complaining of pain in the buttock and coccyx.  She denies any injury to her head, neck, mid or upper back, chest/ribs or the extremities.  Pain is worse when sitting.  She has no numbness or paresthesia.  She reports no significant past medical history, takes no medications and has no allergies to medications.

## 2024-02-21 NOTE — ED PROVIDER NOTE - PHYSICAL EXAMINATION
Attending note.  Patient is alert and in mild to moderate distress.  Head is normocephalic and atraumatic.  Cervical and thoracic spine as well as lumbar spine are nontender.  Patient has tenderness in the lower sacrum and coccyx.  Pelvis is stable when compressed.  Chest/ribs are nontender.  Abdomen is obese, nontender.  Patient moving all extremities without pain or tenderness.  Neurologic examination is grossly intact.  Examination of the skin reveals no ecchymosis or erythema about the buttocks or coccyx.

## 2024-02-21 NOTE — ED ADULT NURSE NOTE - OBJECTIVE STATEMENT
pt is 33y F, no pmhx, c/o fall down stairs 2 days ago, pt went down "a few steps" slight head strike, no LOC, mechanical trip, no AC use, pt primarily struck lower back and buttocks, pt endorses diffuse lower back pain down to buttocks, 7/10, pt taking tylenol, no relief, neuro intact, full ROM, pt able to ambulate but with pain, pt denies any other symptoms, pt A&Ox4, ambulatory, updated on plan of care

## 2024-02-21 NOTE — ED PROVIDER NOTE - CLINICAL SUMMARY MEDICAL DECISION MAKING FREE TEXT BOX
Attending note.  Patient fell down several steps 3 days ago now complaining of pain in the buttocks and sacrum/coccyx.  Patient insist on x-ray.  Analgesia.  Referral to spine center.

## 2024-02-21 NOTE — ED PROVIDER NOTE - NSFOLLOWUPINSTRUCTIONS_ED_ALL_ED_FT
Call [facility] [place] - [phone] for further evaluation and management of your pain.  Tylenol (acetaminophen) two tablets every 4-6 hours or Motrin (Ibuprofen) 2-3 tabs every 6-8 hours if needed. Call [facility] [place] - [phone] for further evaluation and management of your pain.  Tylenol (acetaminophen) two tablets every 4-6 hours or Motrin (Ibuprofen) 2-3 tabs every 6-8 hours if needed. Return to the ER for any new or worsening symptoms.

## 2024-03-04 ENCOUNTER — OUTPATIENT (OUTPATIENT)
Dept: OUTPATIENT SERVICES | Facility: HOSPITAL | Age: 34
LOS: 1 days | End: 2024-03-04
Payer: SELF-PAY

## 2024-03-04 ENCOUNTER — APPOINTMENT (OUTPATIENT)
Dept: INTERNAL MEDICINE | Facility: CLINIC | Age: 34
End: 2024-03-04
Payer: COMMERCIAL

## 2024-03-04 VITALS
OXYGEN SATURATION: 99 % | WEIGHT: 212 LBS | SYSTOLIC BLOOD PRESSURE: 132 MMHG | DIASTOLIC BLOOD PRESSURE: 80 MMHG | HEIGHT: 62 IN | HEART RATE: 79 BPM | BODY MASS INDEX: 39.01 KG/M2

## 2024-03-04 DIAGNOSIS — I10 ESSENTIAL (PRIMARY) HYPERTENSION: ICD-10-CM

## 2024-03-04 DIAGNOSIS — Z00.00 ENCOUNTER FOR GENERAL ADULT MEDICAL EXAMINATION W/OUT ABNORMAL FINDINGS: ICD-10-CM

## 2024-03-04 DIAGNOSIS — Z90.49 ACQUIRED ABSENCE OF OTHER SPECIFIED PARTS OF DIGESTIVE TRACT: Chronic | ICD-10-CM

## 2024-03-04 DIAGNOSIS — Z23 ENCOUNTER FOR IMMUNIZATION: ICD-10-CM

## 2024-03-04 DIAGNOSIS — Z91.89 OTHER SPECIFIED PERSONAL RISK FACTORS, NOT ELSEWHERE CLASSIFIED: ICD-10-CM

## 2024-03-04 LAB
BILIRUB UR QL STRIP: NORMAL
CLARITY UR: CLEAR
COLLECTION METHOD: NORMAL
GLUCOSE UR-MCNC: NORMAL
HCG UR QL: 0.2 EU/DL
HGB UR QL STRIP.AUTO: NORMAL
KETONES UR-MCNC: NORMAL
LEUKOCYTE ESTERASE UR QL STRIP: NORMAL
NITRITE UR QL STRIP: NORMAL
PH UR STRIP: 7
PROT UR STRIP-MCNC: NORMAL
SP GR UR STRIP: 1.01

## 2024-03-04 PROCEDURE — G0463: CPT

## 2024-03-04 PROCEDURE — 81003 URINALYSIS AUTO W/O SCOPE: CPT

## 2024-03-04 PROCEDURE — 99213 OFFICE O/P EST LOW 20 MIN: CPT | Mod: GE

## 2024-03-04 PROCEDURE — 80061 LIPID PANEL: CPT

## 2024-03-04 PROCEDURE — 80053 COMPREHEN METABOLIC PANEL: CPT

## 2024-03-04 PROCEDURE — 83036 HEMOGLOBIN GLYCOSYLATED A1C: CPT

## 2024-03-04 PROCEDURE — 90686 IIV4 VACC NO PRSV 0.5 ML IM: CPT

## 2024-03-04 PROCEDURE — G0008: CPT

## 2024-03-04 PROCEDURE — 85025 COMPLETE CBC W/AUTO DIFF WBC: CPT

## 2024-03-04 RX ORDER — NORETHINDRONE ACETATE AND ETHINYL ESTRADIOL 1.5; 3 MG/1; UG/1
1.5-3 TABLET ORAL DAILY
Qty: 28 | Refills: 2 | Status: DISCONTINUED | COMMUNITY
Start: 2023-12-05 | End: 2024-03-04

## 2024-03-04 RX ORDER — DOXYLAMINE SUCCINATE 25 MG
25 TABLET ORAL
Qty: 1 | Refills: 2 | Status: DISCONTINUED | COMMUNITY
Start: 2019-12-26 | End: 2024-03-04

## 2024-03-04 RX ORDER — DOCUSATE SODIUM 100 MG/1
100 CAPSULE ORAL
Qty: 60 | Refills: 1 | Status: DISCONTINUED | COMMUNITY
Start: 2020-05-11 | End: 2024-03-04

## 2024-03-04 RX ORDER — NORETHINDRONE 0.35 MG/1
0.35 TABLET ORAL
Qty: 3 | Refills: 3 | Status: DISCONTINUED | COMMUNITY
Start: 2023-05-31 | End: 2024-03-04

## 2024-03-04 RX ORDER — CHLORHEXIDINE GLUCONATE 4 %
325 (65 FE) LIQUID (ML) TOPICAL
Qty: 30 | Refills: 1 | Status: DISCONTINUED | COMMUNITY
Start: 2020-05-11 | End: 2024-03-04

## 2024-03-04 RX ORDER — FLUCONAZOLE 150 MG/1
150 TABLET ORAL
Qty: 3 | Refills: 1 | Status: DISCONTINUED | COMMUNITY
Start: 2020-12-07 | End: 2024-03-04

## 2024-03-04 RX ORDER — SELENIUM SULFIDE 25 MG/ML
2.5 LOTION TOPICAL
Qty: 1 | Refills: 0 | Status: DISCONTINUED | COMMUNITY
Start: 2019-11-12 | End: 2024-03-04

## 2024-03-04 RX ORDER — FAMOTIDINE 10 MG/1
10 TABLET, FILM COATED ORAL DAILY
Qty: 1 | Refills: 2 | Status: DISCONTINUED | COMMUNITY
Start: 2020-02-14 | End: 2024-03-04

## 2024-03-04 RX ORDER — FLUTICASONE PROPIONATE 50 UG/1
50 SPRAY, METERED NASAL TWICE DAILY
Qty: 1 | Refills: 8 | Status: DISCONTINUED | COMMUNITY
Start: 2020-03-04 | End: 2024-03-04

## 2024-03-04 NOTE — HISTORY OF PRESENT ILLNESS
[FreeTextEntry1] : CPE [de-identified] : 33F no sig pmhx here for CPE.  #Migraines 2-3x/week HA. 8 years. +Photophobia and phonophobia. Takes 3 tylenol at onset. Improves after sleeping. +snoring, daytime fatigue, BMI >35. STOP BANG 3. Offered triptans however patient deferred as she is trying to become pregnant.  #S/p fall Slipped and fell while walking down stairs at home. Fell on bottom. Seen in ED. XR pelvix/coccyx negative for fracture. Still with pain when sitting.  #Elevated blood pressure reading BP at goal in office today. Elevated at gyn appt 160/100 in 1/2024. Does not own BP cuff and hasn't checked BP at home.  #Dysuria Endorsed intermittent burning with urination for past 3 weeks. No hematuria.  #Obesity BMI 38. Pt reports intentional 10lb weight loss over past 2 months.   #HCM Cervical cancer screening 9/2022, UTD Immunizations: Flu (today). S/p HPV 3x. Taking prenatal vitamins. No other medications.  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 Never smoker. Denies alcohol use.

## 2024-03-04 NOTE — PHYSICAL EXAM
[No Acute Distress] : no acute distress [Well-Appearing] : well-appearing [Normal Sclera/Conjunctiva] : normal sclera/conjunctiva [Normal Outer Ear/Nose] : the outer ears and nose were normal in appearance [EOMI] : extraocular movements intact [No Edema] : there was no peripheral edema [Grossly Normal Strength/Tone] : grossly normal strength/tone [Coordination Grossly Intact] : coordination grossly intact [Normal] : affect was normal and insight and judgment were intact [Normal Gait] : normal gait [No Respiratory Distress] : no respiratory distress  [No Accessory Muscle Use] : no accessory muscle use [Normal Rate] : normal rate

## 2024-03-04 NOTE — REVIEW OF SYSTEMS
[Dysuria] : dysuria [Fatigue] : fatigue [Headache] : headache [Negative] : Heme/Lymph [Fever] : no fever [FreeTextEntry9] : +coccyx pain

## 2024-03-04 NOTE — ASSESSMENT
[FreeTextEntry1] : 33F no sig pmhx here for CPE.  #Migraines 2-3x/week HA. 8 years. +Photophobia and phonophobia. Takes 3 tylenol at onset. Improves after sleeping. +snoring, daytime fatigue, BMI >35. STOP BANG 3. Offered triptans however patient deferred as she is trying to become pregnant. - home sleep test ordered  #S/p fall Slipped and fell while walking down stairs at home. Fell on bottom. Seen in ED. XR pelvix/coccyx negative for fracture. Still with pain when sitting. - ibuprofen 400 q8h prn - discussed using ice packs and wedge seats  #Elevated blood pressure reading BP at goal in office today. Elevated at gyn appt 160/100 in 1/2024. Does not own BP cuff and hasn't checked BP at home. - Pt to obtain home BP cuff. Discussed proper technique for BP measurements. To obtain 3x/wk and record values. Pt to call office if consistently >140/90  #Obesity BMI 38. Pt reports intentional 10lb weight loss over past 2 months. - c/w dietary and lifestyle interventions  #Dysuria Endorsed intermittent burning with urination for past 3 weeks. No hematuria. - POC UA wnl  #HCM Cervical cancer screening 9/2022, UTD Immunizations: Flu (given today). S/p HPV 3x. Taking prenatal vitamins. No other medications. - HCM labs today. Dental and opto referrals placed.  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 Never smoker. Denies alcohol use.  RTC 1yr or earlier prn

## 2024-03-04 NOTE — HEALTH RISK ASSESSMENT
[No] : No [0] : 2) Feeling down, depressed, or hopeless: Not at all (0) [PHQ-2 Negative - No further assessment needed] : PHQ-2 Negative - No further assessment needed [[id number]] : 0

## 2024-03-05 ENCOUNTER — NON-APPOINTMENT (OUTPATIENT)
Age: 34
End: 2024-03-05

## 2024-03-05 LAB
ALBUMIN SERPL ELPH-MCNC: 4.7 G/DL
ALP BLD-CCNC: 123 U/L
ALT SERPL-CCNC: 14 U/L
ANION GAP SERPL CALC-SCNC: 12 MMOL/L
AST SERPL-CCNC: 19 U/L
BASOPHILS # BLD AUTO: 0.04 K/UL
BASOPHILS NFR BLD AUTO: 0.6 %
BILIRUB SERPL-MCNC: 0.2 MG/DL
BUN SERPL-MCNC: 8 MG/DL
CALCIUM SERPL-MCNC: 10.1 MG/DL
CHLORIDE SERPL-SCNC: 102 MMOL/L
CHOLEST SERPL-MCNC: 273 MG/DL
CO2 SERPL-SCNC: 23 MMOL/L
CREAT SERPL-MCNC: 0.57 MG/DL
EGFR: 125 ML/MIN/1.73M2
EOSINOPHIL # BLD AUTO: 0.06 K/UL
EOSINOPHIL NFR BLD AUTO: 0.8 %
ESTIMATED AVERAGE GLUCOSE: 111 MG/DL
GLUCOSE SERPL-MCNC: 86 MG/DL
HBA1C MFR BLD HPLC: 5.5 %
HCT VFR BLD CALC: 38.9 %
HDLC SERPL-MCNC: 40 MG/DL
HGB BLD-MCNC: 12.8 G/DL
IMM GRANULOCYTES NFR BLD AUTO: 0.3 %
LDLC SERPL CALC-MCNC: 169 MG/DL
LYMPHOCYTES # BLD AUTO: 2.47 K/UL
LYMPHOCYTES NFR BLD AUTO: 34.4 %
MAN DIFF?: NORMAL
MCHC RBC-ENTMCNC: 26.7 PG
MCHC RBC-ENTMCNC: 32.9 GM/DL
MCV RBC AUTO: 81 FL
MONOCYTES # BLD AUTO: 0.4 K/UL
MONOCYTES NFR BLD AUTO: 5.6 %
NEUTROPHILS # BLD AUTO: 4.19 K/UL
NEUTROPHILS NFR BLD AUTO: 58.3 %
NONHDLC SERPL-MCNC: 233 MG/DL
PLATELET # BLD AUTO: 330 K/UL
POTASSIUM SERPL-SCNC: 4.7 MMOL/L
PROT SERPL-MCNC: 8.1 G/DL
RBC # BLD: 4.8 M/UL
RBC # FLD: 14 %
SODIUM SERPL-SCNC: 138 MMOL/L
TRIGL SERPL-MCNC: 332 MG/DL
WBC # FLD AUTO: 7.18 K/UL

## 2024-03-11 DIAGNOSIS — R30.0 DYSURIA: ICD-10-CM

## 2024-03-11 DIAGNOSIS — Z91.89 OTHER SPECIFIED PERSONAL RISK FACTORS, NOT ELSEWHERE CLASSIFIED: ICD-10-CM

## 2024-03-11 DIAGNOSIS — Z23 ENCOUNTER FOR IMMUNIZATION: ICD-10-CM

## 2024-04-05 NOTE — ED ADULT NURSE NOTE - NSFALLRSKPASTHIST_ED_ALL_ED
no Patient lives in a private home with his wife and father in law. Reports 12-13 steps to negotiate inside, no steps to enter. Patient ambulatory without a device, independently. Reports receiving outpatient physical therapy prior to admission for left rotator cuff repair.    Patient left sitting in chair at bedside, NAD, all lines and tubes intact, light touch sensation intact, RN Jhoan aware of evaluation

## 2024-04-11 NOTE — PATIENT PROFILE ADULT - IS PATIENT POST-MENOPAUSAL?
What Type Of Note Output Would You Prefer (Optional)?: Bullet Format How Severe Are Your Spot(S)?: moderate Hpi Title: Evaluation of Skin Lesions Additional History: FSE no

## 2024-05-09 ENCOUNTER — EMERGENCY (EMERGENCY)
Facility: HOSPITAL | Age: 34
LOS: 1 days | Discharge: HOME | End: 2024-05-09
Attending: EMERGENCY MEDICINE
Payer: MEDICAID

## 2024-05-09 VITALS
WEIGHT: 184.97 LBS | HEIGHT: 61 IN | TEMPERATURE: 98 F | SYSTOLIC BLOOD PRESSURE: 119 MMHG | OXYGEN SATURATION: 100 % | RESPIRATION RATE: 16 BRPM | DIASTOLIC BLOOD PRESSURE: 64 MMHG | HEART RATE: 79 BPM

## 2024-05-09 DIAGNOSIS — Z90.49 ACQUIRED ABSENCE OF OTHER SPECIFIED PARTS OF DIGESTIVE TRACT: Chronic | ICD-10-CM

## 2024-05-09 LAB
BASOPHILS # BLD AUTO: 0.04 K/UL — SIGNIFICANT CHANGE UP (ref 0–0.2)
BASOPHILS NFR BLD AUTO: 0.4 % — SIGNIFICANT CHANGE UP (ref 0–2)
EOSINOPHIL # BLD AUTO: 0.09 K/UL — SIGNIFICANT CHANGE UP (ref 0–0.5)
EOSINOPHIL NFR BLD AUTO: 0.9 % — SIGNIFICANT CHANGE UP (ref 0–6)
HCT VFR BLD CALC: 34.7 % — SIGNIFICANT CHANGE UP (ref 34.5–45)
HGB BLD-MCNC: 11.6 G/DL — SIGNIFICANT CHANGE UP (ref 11.5–15.5)
IMM GRANULOCYTES NFR BLD AUTO: 0.5 % — SIGNIFICANT CHANGE UP (ref 0–0.9)
LYMPHOCYTES # BLD AUTO: 2.67 K/UL — SIGNIFICANT CHANGE UP (ref 1–3.3)
LYMPHOCYTES # BLD AUTO: 26.8 % — SIGNIFICANT CHANGE UP (ref 13–44)
MCHC RBC-ENTMCNC: 27.1 PG — SIGNIFICANT CHANGE UP (ref 27–34)
MCHC RBC-ENTMCNC: 33.4 GM/DL — SIGNIFICANT CHANGE UP (ref 32–36)
MCV RBC AUTO: 81.1 FL — SIGNIFICANT CHANGE UP (ref 80–100)
MONOCYTES # BLD AUTO: 0.66 K/UL — SIGNIFICANT CHANGE UP (ref 0–0.9)
MONOCYTES NFR BLD AUTO: 6.6 % — SIGNIFICANT CHANGE UP (ref 2–14)
NEUTROPHILS # BLD AUTO: 6.47 K/UL — SIGNIFICANT CHANGE UP (ref 1.8–7.4)
NEUTROPHILS NFR BLD AUTO: 64.8 % — SIGNIFICANT CHANGE UP (ref 43–77)
NRBC # BLD: 0 /100 WBCS — SIGNIFICANT CHANGE UP (ref 0–0)
PLATELET # BLD AUTO: 307 K/UL — SIGNIFICANT CHANGE UP (ref 150–400)
RBC # BLD: 4.28 M/UL — SIGNIFICANT CHANGE UP (ref 3.8–5.2)
RBC # FLD: 14 % — SIGNIFICANT CHANGE UP (ref 10.3–14.5)
WBC # BLD: 9.98 K/UL — SIGNIFICANT CHANGE UP (ref 3.8–10.5)
WBC # FLD AUTO: 9.98 K/UL — SIGNIFICANT CHANGE UP (ref 3.8–10.5)

## 2024-05-09 PROCEDURE — 99285 EMERGENCY DEPT VISIT HI MDM: CPT

## 2024-05-09 RX ORDER — FAMOTIDINE 10 MG/ML
20 INJECTION INTRAVENOUS ONCE
Refills: 0 | Status: COMPLETED | OUTPATIENT
Start: 2024-05-09 | End: 2024-05-09

## 2024-05-09 RX ORDER — SODIUM CHLORIDE 9 MG/ML
1000 INJECTION INTRAMUSCULAR; INTRAVENOUS; SUBCUTANEOUS ONCE
Refills: 0 | Status: COMPLETED | OUTPATIENT
Start: 2024-05-09 | End: 2024-05-09

## 2024-05-09 RX ORDER — ACETAMINOPHEN 500 MG
1000 TABLET ORAL ONCE
Refills: 0 | Status: COMPLETED | OUTPATIENT
Start: 2024-05-09 | End: 2024-05-09

## 2024-05-09 RX ADMIN — FAMOTIDINE 20 MILLIGRAM(S): 10 INJECTION INTRAVENOUS at 23:57

## 2024-05-09 RX ADMIN — SODIUM CHLORIDE 1000 MILLILITER(S): 9 INJECTION INTRAMUSCULAR; INTRAVENOUS; SUBCUTANEOUS at 23:57

## 2024-05-09 RX ADMIN — Medication 400 MILLIGRAM(S): at 23:57

## 2024-05-09 NOTE — ED PROVIDER NOTE - OBJECTIVE STATEMENT
33-year-old female no significant past medical history presents today for epigastric/RUQ abdominal pain with associated nonbloody watery diarrhea x 3 days.  Endorses associated symptoms of nausea, urinary urgency, and worsening abdominal pain postprandially.  Denies fever, chills, chest pain, SOB, change in bowel movement, dark stools, dysuria, hematuria.  No recent travel or sick contacts.  Nobody with similar complaints at home.  No change in diet.  PSH cholecystectomy.  Noted positive pregnancy test with LMP 3/20.

## 2024-05-09 NOTE — ED PROVIDER NOTE - NSFOLLOWUPINSTRUCTIONS_ED_ALL_ED_FT
Please schedule follow-up appointment with PCP for further evaluation of symptoms    Please schedule follow-up appointment with your OB/GYN for single live intrauterine pregnancy.    Please take Macrobid 100 mg orally, twice a day for 5 days.  Use as prescribed on bottle.    Continue with healthy diet including increased hydration and fluids.    Please take Tylenol up to 650 mg every 6 hours as needed for pain    Please take any prescribed medications as instructed by your PMD    Abdominal Pain    Many things can cause abdominal pain. Many times, abdominal pain is not caused by a disease and will improve without treatment. Your health care provider will do a physical exam to determine if there is a dangerous cause of your pain; blood tests and imaging may help determine the cause of your pain. However, in many cases, no cause may be found and you may need further testing as an outpatient. Monitor your abdominal pain for any changes.     SEEK IMMEDIATE MEDICAL CARE IF YOU HAVE ANY OF THE FOLLOWING SYMPTOMS: worsening abdominal pain, uncontrollable vomiting, profuse diarrhea, inability to have bowel movements or pass gas, black or bloody stools, fever accompanying chest pain or back pain, or fainting. These symptoms may represent a serious problem that is an emergency. Do not wait to see if the symptoms will go away. Get medical help right away. Call 911 and do not drive yourself to the hospital.

## 2024-05-09 NOTE — ED PROVIDER NOTE - PHYSICAL EXAMINATION
Gen: NAD, non-toxic appearing  Head: normal appearing  HEENT: normal conjunctiva, oral mucosa dry   Lung: no respiratory distress, speaking in full sentences, CTA b/l     CV: regular rate and rhythm, no murmurs, no LE edema   Abd: soft, non distended, RUQ/epigastric tender, no rebounding or guarding, neg cva   MSK: no visible deformities  Neuro: No focal deficits, AAOx3  Skin: Warm  Psych: normal affect

## 2024-05-09 NOTE — ED PROVIDER NOTE - CLINICAL SUMMARY MEDICAL DECISION MAKING FREE TEXT BOX
Afebrile hemodynamically stable female presents today for epigastric/RUQ abdominal pain with associated watery diarrhea x 3 days is exacerbated postprandially.  Mild epigastric/RUQ abdominal tenderness with no rebounding or guarding.  Ekg NSR w/ no signs of ischemic changes. Ordered basic labs,  ECG, UA with culture, lipase, CXR to rule out infectious etiology versus UTI versus pancreatitis versus gastroenteritis.  Given pain meds, Pepcid, fluids, will reassess.

## 2024-05-09 NOTE — ED ADULT TRIAGE NOTE - CHIEF COMPLAINT QUOTE
4 days of abdominal pain and 1 day of diarrhea  +pregnancy test at, first appointment Monday LMP 3/20

## 2024-05-09 NOTE — ED PROVIDER NOTE - ATTENDING CONTRIBUTION TO CARE
G5, P4 female LMP mid March, history of cholecystectomy, here with several days of abdominal cramping, diarrhea, nausea and a positive pregnancy test 2 weeks ago.  Patient has not yet seen an OB/GYN.  Patient denies urinary symptoms, fever.  On exam patient is well-appearing, unremarkable vital signs, afebrile, soft abd, minimal right upper quadrant tenderness without guarding or rebound.  Differential diagnosis includes but not limited to symptoms from pregnancy, complications of early pregnancy is such as ectopic versus early miscarriage, gastroenteritis.  Low suspicion for biliary tree pathology given history of cholecystectomy, low concern for appendicitis given location of pain.  We will confirm pregnancy and will get TVUS, basic labs, UA, reevaluate.

## 2024-05-09 NOTE — ED PROVIDER NOTE - PATIENT PORTAL LINK FT
You can access the FollowMyHealth Patient Portal offered by [facility] by registering at the following website: [email or web address]. By joining [employer]’s FollowMyHealth portal, you will also be able to view your health information using other applications (apps) compatible with our system.

## 2024-05-09 NOTE — ED PROVIDER NOTE - PROGRESS NOTE DETAILS
Filipe Espitia,  (PGY1)  VSS. Discussed with patient lab results revealing elevated hCG with transvaginal ultrasound revealing a single live intrauterine pregnancy.  Informed patient of positive UA, given nitrofurantoin 100 mg orally in ED.  Prescribed patient nitrofurantoin 100 mg orally twice daily for 5 days.  Advised patient to follow-up PCP and OB/GYN for further evaluation of symptoms and pregnancy.  Patient feeling symptomatically better s/p fluids and meds.  Medically cleared for discharge. Time was taken to answer all of patients questions and concerns. Return precaution instructions were given and patient understands and feels comfortable with disposition.

## 2024-05-10 VITALS
TEMPERATURE: 98 F | SYSTOLIC BLOOD PRESSURE: 128 MMHG | HEART RATE: 62 BPM | OXYGEN SATURATION: 100 % | RESPIRATION RATE: 16 BRPM | DIASTOLIC BLOOD PRESSURE: 72 MMHG

## 2024-05-10 LAB
ADD ON TEST-SPECIMEN IN LAB: SIGNIFICANT CHANGE UP
ALBUMIN SERPL ELPH-MCNC: 4.4 G/DL — SIGNIFICANT CHANGE UP (ref 3.3–5)
ALP SERPL-CCNC: 116 U/L — SIGNIFICANT CHANGE UP (ref 40–120)
ALT FLD-CCNC: 14 U/L — SIGNIFICANT CHANGE UP (ref 10–45)
ANION GAP SERPL CALC-SCNC: 11 MMOL/L — SIGNIFICANT CHANGE UP (ref 5–17)
APPEARANCE UR: CLEAR — SIGNIFICANT CHANGE UP
AST SERPL-CCNC: 16 U/L — SIGNIFICANT CHANGE UP (ref 10–40)
BACTERIA # UR AUTO: ABNORMAL /HPF
BILIRUB SERPL-MCNC: 0.3 MG/DL — SIGNIFICANT CHANGE UP (ref 0.2–1.2)
BILIRUB UR-MCNC: NEGATIVE — SIGNIFICANT CHANGE UP
BUN SERPL-MCNC: 13 MG/DL — SIGNIFICANT CHANGE UP (ref 7–23)
CALCIUM SERPL-MCNC: 9.3 MG/DL — SIGNIFICANT CHANGE UP (ref 8.4–10.5)
CAST: 0 /LPF — SIGNIFICANT CHANGE UP (ref 0–4)
CHLORIDE SERPL-SCNC: 105 MMOL/L — SIGNIFICANT CHANGE UP (ref 96–108)
CO2 SERPL-SCNC: 21 MMOL/L — LOW (ref 22–31)
COLOR SPEC: YELLOW — SIGNIFICANT CHANGE UP
CREAT SERPL-MCNC: 0.64 MG/DL — SIGNIFICANT CHANGE UP (ref 0.5–1.3)
DIFF PNL FLD: ABNORMAL
EGFR: 121 ML/MIN/1.73M2 — SIGNIFICANT CHANGE UP
GLUCOSE SERPL-MCNC: 102 MG/DL — HIGH (ref 70–99)
GLUCOSE UR QL: NEGATIVE MG/DL — SIGNIFICANT CHANGE UP
HCG SERPL-ACNC: HIGH MIU/ML
KETONES UR-MCNC: NEGATIVE MG/DL — SIGNIFICANT CHANGE UP
LEUKOCYTE ESTERASE UR-ACNC: ABNORMAL
LIDOCAIN IGE QN: 27 U/L — SIGNIFICANT CHANGE UP (ref 7–60)
NITRITE UR-MCNC: NEGATIVE — SIGNIFICANT CHANGE UP
PH UR: 5.5 — SIGNIFICANT CHANGE UP (ref 5–8)
POTASSIUM SERPL-MCNC: 3.9 MMOL/L — SIGNIFICANT CHANGE UP (ref 3.5–5.3)
POTASSIUM SERPL-SCNC: 3.9 MMOL/L — SIGNIFICANT CHANGE UP (ref 3.5–5.3)
PROT SERPL-MCNC: 8.1 G/DL — SIGNIFICANT CHANGE UP (ref 6–8.3)
PROT UR-MCNC: NEGATIVE MG/DL — SIGNIFICANT CHANGE UP
RBC CASTS # UR COMP ASSIST: 1 /HPF — SIGNIFICANT CHANGE UP (ref 0–4)
SODIUM SERPL-SCNC: 137 MMOL/L — SIGNIFICANT CHANGE UP (ref 135–145)
SP GR SPEC: 1.01 — SIGNIFICANT CHANGE UP (ref 1–1.03)
SQUAMOUS # UR AUTO: 5 /HPF — SIGNIFICANT CHANGE UP (ref 0–5)
UROBILINOGEN FLD QL: 0.2 MG/DL — SIGNIFICANT CHANGE UP (ref 0.2–1)
WBC UR QL: 18 /HPF — HIGH (ref 0–5)

## 2024-05-10 PROCEDURE — 93975 VASCULAR STUDY: CPT | Mod: 26

## 2024-05-10 PROCEDURE — 93005 ELECTROCARDIOGRAM TRACING: CPT

## 2024-05-10 PROCEDURE — 80053 COMPREHEN METABOLIC PANEL: CPT

## 2024-05-10 PROCEDURE — 93975 VASCULAR STUDY: CPT

## 2024-05-10 PROCEDURE — 81001 URINALYSIS AUTO W/SCOPE: CPT

## 2024-05-10 PROCEDURE — 85025 COMPLETE CBC W/AUTO DIFF WBC: CPT

## 2024-05-10 PROCEDURE — 36415 COLL VENOUS BLD VENIPUNCTURE: CPT

## 2024-05-10 PROCEDURE — 96375 TX/PRO/DX INJ NEW DRUG ADDON: CPT

## 2024-05-10 PROCEDURE — 83690 ASSAY OF LIPASE: CPT

## 2024-05-10 PROCEDURE — 84100 ASSAY OF PHOSPHORUS: CPT

## 2024-05-10 PROCEDURE — 76817 TRANSVAGINAL US OBSTETRIC: CPT

## 2024-05-10 PROCEDURE — 84702 CHORIONIC GONADOTROPIN TEST: CPT

## 2024-05-10 PROCEDURE — 96374 THER/PROPH/DIAG INJ IV PUSH: CPT

## 2024-05-10 PROCEDURE — 83735 ASSAY OF MAGNESIUM: CPT

## 2024-05-10 PROCEDURE — 76817 TRANSVAGINAL US OBSTETRIC: CPT | Mod: 26

## 2024-05-10 PROCEDURE — 87086 URINE CULTURE/COLONY COUNT: CPT

## 2024-05-10 PROCEDURE — 99285 EMERGENCY DEPT VISIT HI MDM: CPT | Mod: 25

## 2024-05-10 RX ORDER — NITROFURANTOIN MACROCRYSTAL 50 MG
1 CAPSULE ORAL
Qty: 10 | Refills: 0
Start: 2024-05-10 | End: 2024-05-14

## 2024-05-10 RX ORDER — NITROFURANTOIN MACROCRYSTAL 50 MG
100 CAPSULE ORAL ONCE
Refills: 0 | Status: COMPLETED | OUTPATIENT
Start: 2024-05-10 | End: 2024-05-10

## 2024-05-10 RX ADMIN — Medication 100 MILLIGRAM(S): at 03:27

## 2024-05-11 LAB
CULTURE RESULTS: SIGNIFICANT CHANGE UP
SPECIMEN SOURCE: SIGNIFICANT CHANGE UP

## 2024-05-13 ENCOUNTER — APPOINTMENT (OUTPATIENT)
Dept: OBGYN | Facility: CLINIC | Age: 34
End: 2024-05-13

## 2024-05-16 ENCOUNTER — OUTPATIENT (OUTPATIENT)
Dept: OUTPATIENT SERVICES | Facility: HOSPITAL | Age: 34
LOS: 1 days | End: 2024-05-16
Payer: MEDICAID

## 2024-05-16 ENCOUNTER — APPOINTMENT (OUTPATIENT)
Dept: OBGYN | Facility: CLINIC | Age: 34
End: 2024-05-16
Payer: MEDICAID

## 2024-05-16 DIAGNOSIS — N76.0 ACUTE VAGINITIS: ICD-10-CM

## 2024-05-16 PROCEDURE — 99213 OFFICE O/P EST LOW 20 MIN: CPT

## 2024-05-16 PROCEDURE — G0463: CPT

## 2024-05-16 NOTE — PROCEDURE
[Intrauterine Pregnancy] : intrauterine pregnancy [Yolk Sac] : yolk sac present [Fetal Heart] : fetal heart present [FreeTextEntry1] : CRL corresponding w/ 7.1 weeks, which is the measurement given in ED on 5/10 - so dating is off
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
## 2024-05-17 DIAGNOSIS — Z34.90 ENCOUNTER FOR SUPERVISION OF NORMAL PREGNANCY, UNSPECIFIED, UNSPECIFIED TRIMESTER: ICD-10-CM

## 2024-06-03 ENCOUNTER — NON-APPOINTMENT (OUTPATIENT)
Age: 34
End: 2024-06-03

## 2024-06-04 DIAGNOSIS — Z30.432 ENCOUNTER FOR REMOVAL OF INTRAUTERINE CONTRACEPTIVE DEVICE: ICD-10-CM

## 2024-06-04 DIAGNOSIS — Z77.011 CONTACT WITH AND (SUSPECTED) EXPOSURE TO LEAD: ICD-10-CM

## 2024-06-04 DIAGNOSIS — Z87.09 PERSONAL HISTORY OF OTHER DISEASES OF THE RESPIRATORY SYSTEM: ICD-10-CM

## 2024-06-04 DIAGNOSIS — R07.89 OTHER CHEST PAIN: ICD-10-CM

## 2024-06-04 DIAGNOSIS — Z30.430 ENCOUNTER FOR INSERTION OF INTRAUTERINE CONTRACEPTIVE DEVICE: ICD-10-CM

## 2024-06-04 DIAGNOSIS — Z97.5 EXCESSIVE AND FREQUENT MENSTRUATION WITH IRREGULAR CYCLE: ICD-10-CM

## 2024-06-04 DIAGNOSIS — N92.1 EXCESSIVE AND FREQUENT MENSTRUATION WITH IRREGULAR CYCLE: ICD-10-CM

## 2024-06-04 DIAGNOSIS — Z86.16 PERSONAL HISTORY OF COVID-19: ICD-10-CM

## 2024-06-04 DIAGNOSIS — L21.0 SEBORRHEA CAPITIS: ICD-10-CM

## 2024-06-04 DIAGNOSIS — Z30.017 ENCOUNTER FOR INITIAL PRESCRIPTION OF IMPLANTABLE SUBDERMAL CONTRACEPTIVE: ICD-10-CM

## 2024-06-04 DIAGNOSIS — N91.1 SECONDARY AMENORRHEA: ICD-10-CM

## 2024-06-04 DIAGNOSIS — R09.02 HYPOXEMIA: ICD-10-CM

## 2024-06-04 DIAGNOSIS — Z30.46 ENCOUNTER FOR SURVEILLANCE OF IMPLANTABLE SUBDERMAL CONTRACEPTIVE: ICD-10-CM
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
## 2024-06-04 DIAGNOSIS — Z87.42 PERSONAL HISTORY OF OTHER DISEASES OF THE FEMALE GENITAL TRACT: ICD-10-CM

## 2024-06-05 ENCOUNTER — OUTPATIENT (OUTPATIENT)
Dept: OUTPATIENT SERVICES | Facility: HOSPITAL | Age: 34
LOS: 1 days | End: 2024-06-05
Payer: MEDICAID

## 2024-06-05 ENCOUNTER — LABORATORY RESULT (OUTPATIENT)
Age: 34
End: 2024-06-05

## 2024-06-05 ENCOUNTER — APPOINTMENT (OUTPATIENT)
Dept: ANTEPARTUM | Facility: CLINIC | Age: 34
End: 2024-06-05
Payer: MEDICAID

## 2024-06-05 ENCOUNTER — APPOINTMENT (OUTPATIENT)
Dept: OBGYN | Facility: CLINIC | Age: 34
End: 2024-06-05
Payer: MEDICAID

## 2024-06-05 ENCOUNTER — ASOB RESULT (OUTPATIENT)
Age: 34
End: 2024-06-05

## 2024-06-05 VITALS — BODY MASS INDEX: 37.68 KG/M2 | DIASTOLIC BLOOD PRESSURE: 80 MMHG | WEIGHT: 206 LBS | SYSTOLIC BLOOD PRESSURE: 124 MMHG

## 2024-06-05 DIAGNOSIS — Z87.42 PERSONAL HISTORY OF OTHER DISEASES OF THE FEMALE GENITAL TRACT: ICD-10-CM

## 2024-06-05 DIAGNOSIS — O21.9 VOMITING OF PREGNANCY, UNSPECIFIED: ICD-10-CM

## 2024-06-05 DIAGNOSIS — N64.4 MASTODYNIA: ICD-10-CM

## 2024-06-05 DIAGNOSIS — D57.3 SICKLE-CELL TRAIT: ICD-10-CM

## 2024-06-05 DIAGNOSIS — Z34.80 ENCOUNTER FOR SUPERVISION OF OTHER NORMAL PREGNANCY, UNSPECIFIED TRIMESTER: ICD-10-CM

## 2024-06-05 DIAGNOSIS — Z34.90 ENCOUNTER FOR SUPERVISION OF NORMAL PREGNANCY, UNSPECIFIED, UNSPECIFIED TRIMESTER: ICD-10-CM

## 2024-06-05 DIAGNOSIS — R51.9 HEADACHE, UNSPECIFIED: ICD-10-CM

## 2024-06-05 DIAGNOSIS — O23.41 UNSPECIFIED INFECTION OF URINARY TRACT IN PREGNANCY, FIRST TRIMESTER: ICD-10-CM

## 2024-06-05 DIAGNOSIS — G89.29 HEADACHE, UNSPECIFIED: ICD-10-CM

## 2024-06-05 DIAGNOSIS — Z90.49 ACQUIRED ABSENCE OF OTHER SPECIFIED PARTS OF DIGESTIVE TRACT: Chronic | ICD-10-CM

## 2024-06-05 DIAGNOSIS — N39.3 STRESS INCONTINENCE (FEMALE) (MALE): ICD-10-CM

## 2024-06-05 LAB
BILIRUB UR QL STRIP: NORMAL
CLARITY UR: CLEAR
COLLECTION METHOD: NORMAL
GLUCOSE UR-MCNC: NORMAL
HCG UR QL: 0.2 EU/DL
HGB UR QL STRIP.AUTO: NORMAL
KETONES UR-MCNC: NORMAL
LEUKOCYTE ESTERASE UR QL STRIP: NORMAL
NITRITE UR QL STRIP: POSITIVE
PH UR STRIP: 6
PROT UR STRIP-MCNC: NORMAL
SP GR UR STRIP: 1.02

## 2024-06-05 PROCEDURE — 76815 OB US LIMITED FETUS(S): CPT | Mod: 26

## 2024-06-05 PROCEDURE — 76801 OB US < 14 WKS SINGLE FETUS: CPT

## 2024-06-05 PROCEDURE — 99203 OFFICE O/P NEW LOW 30 MIN: CPT | Mod: 25

## 2024-06-05 RX ORDER — KRILL/OM-3/DHA/EPA/PHOSPHO/AST 1000-230MG
81 CAPSULE ORAL
Qty: 1 | Refills: 3 | Status: ACTIVE | COMMUNITY
Start: 2024-06-05 | End: 1900-01-01

## 2024-06-05 RX ORDER — CEPHALEXIN 500 MG/1
500 CAPSULE ORAL
Qty: 14 | Refills: 0 | Status: ACTIVE | COMMUNITY
Start: 2024-06-05 | End: 1900-01-01

## 2024-06-05 RX ORDER — DOXYLAMINE SUCCINATE AND PYRIDOXINE HYDROCHLORIDE 10; 10 MG/1; MG/1
10-10 TABLET, DELAYED RELEASE ORAL
Qty: 30 | Refills: 1 | Status: ACTIVE | COMMUNITY
Start: 2024-06-05 | End: 1900-01-01

## 2024-06-06 DIAGNOSIS — O23.41 UNSPECIFIED INFECTION OF URINARY TRACT IN PREGNANCY, FIRST TRIMESTER: ICD-10-CM

## 2024-06-06 DIAGNOSIS — Z34.90 ENCOUNTER FOR SUPERVISION OF NORMAL PREGNANCY, UNSPECIFIED, UNSPECIFIED TRIMESTER: ICD-10-CM

## 2024-06-06 DIAGNOSIS — D57.3 SICKLE-CELL TRAIT: ICD-10-CM

## 2024-06-06 DIAGNOSIS — O21.9 VOMITING OF PREGNANCY, UNSPECIFIED: ICD-10-CM

## 2024-06-06 LAB
A1C WITH ESTIMATED AVERAGE GLUCOSE RESULT: 5.2 % — SIGNIFICANT CHANGE UP (ref 4–5.6)
ALBUMIN SERPL ELPH-MCNC: 4.4 G/DL — SIGNIFICANT CHANGE UP (ref 3.3–5)
ALP SERPL-CCNC: 131 U/L — HIGH (ref 40–120)
ALT FLD-CCNC: 15 U/L — SIGNIFICANT CHANGE UP (ref 10–45)
ANION GAP SERPL CALC-SCNC: 14 MMOL/L — SIGNIFICANT CHANGE UP (ref 5–17)
AST SERPL-CCNC: 16 U/L — SIGNIFICANT CHANGE UP (ref 10–40)
BILIRUB SERPL-MCNC: 0.3 MG/DL — SIGNIFICANT CHANGE UP (ref 0.2–1.2)
BUN SERPL-MCNC: 7 MG/DL — SIGNIFICANT CHANGE UP (ref 7–23)
C TRACH RRNA SPEC QL NAA+PROBE: SIGNIFICANT CHANGE UP
CALCIUM SERPL-MCNC: 9.6 MG/DL — SIGNIFICANT CHANGE UP (ref 8.4–10.5)
CHLORIDE SERPL-SCNC: 102 MMOL/L — SIGNIFICANT CHANGE UP (ref 96–108)
CO2 SERPL-SCNC: 17 MMOL/L — LOW (ref 22–31)
CREAT SERPL-MCNC: 0.46 MG/DL — LOW (ref 0.5–1.3)
EGFR: 131 ML/MIN/1.73M2 — SIGNIFICANT CHANGE UP
ESTIMATED AVERAGE GLUCOSE: 103 MG/DL — SIGNIFICANT CHANGE UP (ref 68–114)
GLUCOSE 1H P MEAL SERPL-MCNC: 155 MG/DL — HIGH (ref 70–134)
GLUCOSE SERPL-MCNC: 162 MG/DL — HIGH (ref 70–99)
HBV SURFACE AG SER-ACNC: SIGNIFICANT CHANGE UP
HCT VFR BLD CALC: 37.6 % — SIGNIFICANT CHANGE UP (ref 34.5–45)
HCV AB S/CO SERPL IA: 0.1 S/CO — SIGNIFICANT CHANGE UP (ref 0–0.99)
HCV AB SERPL-IMP: SIGNIFICANT CHANGE UP
HGB BLD-MCNC: 12.2 G/DL — SIGNIFICANT CHANGE UP (ref 11.5–15.5)
HIV 1+2 AB+HIV1 P24 AG SERPL QL IA: SIGNIFICANT CHANGE UP
HPV HIGH+LOW RISK DNA PNL CVX: SIGNIFICANT CHANGE UP
MCHC RBC-ENTMCNC: 27.3 PG — SIGNIFICANT CHANGE UP (ref 27–34)
MCHC RBC-ENTMCNC: 32.4 GM/DL — SIGNIFICANT CHANGE UP (ref 32–36)
MCV RBC AUTO: 84.1 FL — SIGNIFICANT CHANGE UP (ref 80–100)
MEV IGG SER-ACNC: >300 AU/ML — SIGNIFICANT CHANGE UP
MEV IGG+IGM SER-IMP: POSITIVE — SIGNIFICANT CHANGE UP
N GONORRHOEA RRNA SPEC QL NAA+PROBE: SIGNIFICANT CHANGE UP
PLATELET # BLD AUTO: 287 K/UL — SIGNIFICANT CHANGE UP (ref 150–400)
POTASSIUM SERPL-MCNC: 3.5 MMOL/L — SIGNIFICANT CHANGE UP (ref 3.5–5.3)
POTASSIUM SERPL-SCNC: 3.5 MMOL/L — SIGNIFICANT CHANGE UP (ref 3.5–5.3)
PROT SERPL-MCNC: 7.6 G/DL — SIGNIFICANT CHANGE UP (ref 6–8.3)
RBC # BLD: 4.47 M/UL — SIGNIFICANT CHANGE UP (ref 3.8–5.2)
RBC # FLD: 14.1 % — SIGNIFICANT CHANGE UP (ref 10.3–14.5)
RUBV IGG SER-ACNC: 16.5 INDEX — SIGNIFICANT CHANGE UP
RUBV IGG SER-IMP: POSITIVE — SIGNIFICANT CHANGE UP
SODIUM SERPL-SCNC: 134 MMOL/L — LOW (ref 135–145)
SPECIMEN SOURCE: SIGNIFICANT CHANGE UP
T PALLIDUM AB TITR SER: NEGATIVE — SIGNIFICANT CHANGE UP
TSH SERPL-MCNC: 0.02 UIU/ML — LOW (ref 0.27–4.2)
WBC # BLD: 7.08 K/UL — SIGNIFICANT CHANGE UP (ref 3.8–10.5)
WBC # FLD AUTO: 7.08 K/UL — SIGNIFICANT CHANGE UP (ref 3.8–10.5)

## 2024-06-07 LAB
CULTURE RESULTS: SIGNIFICANT CHANGE UP
GAMMA INTERFERON BACKGROUND BLD IA-ACNC: 0.08 IU/ML — SIGNIFICANT CHANGE UP
LEAD BLD-MCNC: <1 UG/DL — SIGNIFICANT CHANGE UP (ref 0–3.4)
M TB IFN-G BLD-IMP: NEGATIVE — SIGNIFICANT CHANGE UP
M TB IFN-G CD4+ BCKGRND COR BLD-ACNC: 0 IU/ML — SIGNIFICANT CHANGE UP
M TB IFN-G CD4+CD8+ BCKGRND COR BLD-ACNC: 0 IU/ML — SIGNIFICANT CHANGE UP
QUANT TB PLUS MITOGEN MINUS NIL: 3.14 IU/ML — SIGNIFICANT CHANGE UP
SPECIMEN SOURCE: SIGNIFICANT CHANGE UP

## 2024-06-07 NOTE — REASON FOR VISIT
(M6) obeys commands [Annual] : an annual visit. [Abnormal Uterine Bleeding] : abnormal uterine bleeding [Incontinence] : incontinence

## 2024-06-08 LAB — CYTOLOGY SPEC DOC CYTO: SIGNIFICANT CHANGE UP

## 2024-06-10 DIAGNOSIS — R73.09 OTHER ABNORMAL GLUCOSE: ICD-10-CM

## 2024-06-10 PROCEDURE — 82951 GLUCOSE TOLERANCE TEST (GTT): CPT

## 2024-06-10 PROCEDURE — 87591 N.GONORRHOEAE DNA AMP PROB: CPT

## 2024-06-10 PROCEDURE — 82952 GTT-ADDED SAMPLES: CPT

## 2024-06-10 PROCEDURE — G0463: CPT

## 2024-06-10 PROCEDURE — 86480 TB TEST CELL IMMUN MEASURE: CPT

## 2024-06-10 PROCEDURE — 86765 RUBEOLA ANTIBODY: CPT

## 2024-06-10 PROCEDURE — 87389 HIV-1 AG W/HIV-1&-2 AB AG IA: CPT

## 2024-06-10 PROCEDURE — 81002 URINALYSIS NONAUTO W/O SCOPE: CPT

## 2024-06-10 PROCEDURE — 86762 RUBELLA ANTIBODY: CPT

## 2024-06-10 PROCEDURE — 85027 COMPLETE CBC AUTOMATED: CPT

## 2024-06-10 PROCEDURE — 87340 HEPATITIS B SURFACE AG IA: CPT

## 2024-06-10 PROCEDURE — 83036 HEMOGLOBIN GLYCOSYLATED A1C: CPT

## 2024-06-10 PROCEDURE — 84443 ASSAY THYROID STIM HORMONE: CPT

## 2024-06-10 PROCEDURE — 87086 URINE CULTURE/COLONY COUNT: CPT

## 2024-06-10 PROCEDURE — 87624 HPV HI-RISK TYP POOLED RSLT: CPT

## 2024-06-10 PROCEDURE — 80053 COMPREHEN METABOLIC PANEL: CPT

## 2024-06-10 PROCEDURE — 86780 TREPONEMA PALLIDUM: CPT

## 2024-06-10 PROCEDURE — 87491 CHLMYD TRACH DNA AMP PROBE: CPT

## 2024-06-10 PROCEDURE — 97802 MEDICAL NUTRITION INDIV IN: CPT

## 2024-06-10 PROCEDURE — 86900 BLOOD TYPING SEROLOGIC ABO: CPT

## 2024-06-10 PROCEDURE — 86803 HEPATITIS C AB TEST: CPT

## 2024-06-10 PROCEDURE — 76815 OB US LIMITED FETUS(S): CPT

## 2024-06-10 PROCEDURE — 82950 GLUCOSE TEST: CPT

## 2024-06-10 PROCEDURE — 86850 RBC ANTIBODY SCREEN: CPT

## 2024-06-10 PROCEDURE — 83655 ASSAY OF LEAD: CPT

## 2024-06-11 LAB
GLUCOSE 1H P GLC SERPL-MCNC: 150 MG/DL — SIGNIFICANT CHANGE UP (ref 70–199)
GLUCOSE 2H P GLC SERPL-MCNC: 156 MG/DL — HIGH (ref 70–139)
GLUCOSE 3H P GLC SERPL-MCNC: 103 MG/DL — SIGNIFICANT CHANGE UP (ref 70–110)
GLUCOSE P FAST BLDV-MCNC: 84 MG/DL — SIGNIFICANT CHANGE UP (ref 70–99)

## 2024-06-17 ENCOUNTER — NON-APPOINTMENT (OUTPATIENT)
Age: 34
End: 2024-06-17

## 2024-06-24 ENCOUNTER — APPOINTMENT (OUTPATIENT)
Dept: ANTEPARTUM | Facility: CLINIC | Age: 34
End: 2024-06-24
Payer: MEDICAID

## 2024-06-24 ENCOUNTER — ASOB RESULT (OUTPATIENT)
Age: 34
End: 2024-06-24

## 2024-06-24 PROCEDURE — 76801 OB US < 14 WKS SINGLE FETUS: CPT

## 2024-06-24 PROCEDURE — 76813 OB US NUCHAL MEAS 1 GEST: CPT | Mod: 59

## 2024-07-05 ENCOUNTER — LABORATORY RESULT (OUTPATIENT)
Age: 34
End: 2024-07-05

## 2024-07-05 ENCOUNTER — OUTPATIENT (OUTPATIENT)
Dept: OUTPATIENT SERVICES | Facility: HOSPITAL | Age: 34
LOS: 1 days | End: 2024-07-05
Payer: MEDICAID

## 2024-07-05 ENCOUNTER — APPOINTMENT (OUTPATIENT)
Dept: OBGYN | Facility: CLINIC | Age: 34
End: 2024-07-05
Payer: MEDICAID

## 2024-07-05 VITALS — WEIGHT: 205 LBS | SYSTOLIC BLOOD PRESSURE: 118 MMHG | BODY MASS INDEX: 37.5 KG/M2 | DIASTOLIC BLOOD PRESSURE: 80 MMHG

## 2024-07-05 DIAGNOSIS — Z34.90 ENCOUNTER FOR SUPERVISION OF NORMAL PREGNANCY, UNSPECIFIED, UNSPECIFIED TRIMESTER: ICD-10-CM

## 2024-07-05 DIAGNOSIS — Z34.80 ENCOUNTER FOR SUPERVISION OF OTHER NORMAL PREGNANCY, UNSPECIFIED TRIMESTER: ICD-10-CM

## 2024-07-05 DIAGNOSIS — O21.9 VOMITING OF PREGNANCY, UNSPECIFIED: ICD-10-CM

## 2024-07-05 DIAGNOSIS — O23.41 UNSPECIFIED INFECTION OF URINARY TRACT IN PREGNANCY, FIRST TRIMESTER: ICD-10-CM

## 2024-07-05 DIAGNOSIS — Z34.92 ENCOUNTER FOR SUPERVISION OF NORMAL PREGNANCY, UNSPECIFIED, SECOND TRIMESTER: ICD-10-CM

## 2024-07-05 LAB
T4 FREE SERPL-MCNC: 1 NG/DL — SIGNIFICANT CHANGE UP (ref 0.9–1.8)
TSH SERPL-MCNC: 0.05 UIU/ML — LOW (ref 0.27–4.2)

## 2024-07-05 PROCEDURE — 81003 URINALYSIS AUTO W/O SCOPE: CPT

## 2024-07-05 PROCEDURE — 84443 ASSAY THYROID STIM HORMONE: CPT

## 2024-07-05 PROCEDURE — 84439 ASSAY OF FREE THYROXINE: CPT

## 2024-07-05 PROCEDURE — G0463: CPT

## 2024-07-05 PROCEDURE — 99213 OFFICE O/P EST LOW 20 MIN: CPT

## 2024-07-05 PROCEDURE — 36415 COLL VENOUS BLD VENIPUNCTURE: CPT

## 2024-07-08 DIAGNOSIS — Z34.90 ENCOUNTER FOR SUPERVISION OF NORMAL PREGNANCY, UNSPECIFIED, UNSPECIFIED TRIMESTER: ICD-10-CM

## 2024-07-08 DIAGNOSIS — Z34.92 ENCOUNTER FOR SUPERVISION OF NORMAL PREGNANCY, UNSPECIFIED, SECOND TRIMESTER: ICD-10-CM

## 2024-07-17 NOTE — H&P ADULT. - GIT GEN HX ROS MEA POS PC
See H & P from CNP Ayana Sanders dated 6/27/2024 provided as surgical clearance for scheduled surgery.    No relevant changes since last seen.   Patient underwent Hysteroscopy, D & C, polypectomy on 02/21/2024 resulting in Pathology of endometrial hyperplasia without atypia.  Treatment recommended: High dose Progesterone x 3 months and resample endometrium after 3 months of treatment.  Patient presents today for re-sampling.  Patient reports bleeding resumed 2 weeks after completing medical treatment and is currently bleeding.   
nausea/vomiting/diarrhea

## 2024-08-12 ENCOUNTER — APPOINTMENT (OUTPATIENT)
Dept: OBGYN | Facility: CLINIC | Age: 34
End: 2024-08-12
Payer: MEDICAID

## 2024-08-12 VITALS — WEIGHT: 208 LBS | DIASTOLIC BLOOD PRESSURE: 78 MMHG | BODY MASS INDEX: 38.04 KG/M2 | SYSTOLIC BLOOD PRESSURE: 120 MMHG

## 2024-08-12 DIAGNOSIS — N89.8 OTHER SPECIFIED NONINFLAMMATORY DISORDERS OF VAGINA: ICD-10-CM

## 2024-08-12 PROCEDURE — 99213 OFFICE O/P EST LOW 20 MIN: CPT

## 2024-08-12 RX ORDER — TERCONAZOLE 8 MG/G
0.8 CREAM VAGINAL
Qty: 1 | Refills: 0 | Status: ACTIVE | COMMUNITY
Start: 2024-08-12 | End: 1900-01-01

## 2024-08-19 ENCOUNTER — ASOB RESULT (OUTPATIENT)
Age: 34
End: 2024-08-19

## 2024-08-19 ENCOUNTER — APPOINTMENT (OUTPATIENT)
Dept: ANTEPARTUM | Facility: CLINIC | Age: 34
End: 2024-08-19
Payer: MEDICAID

## 2024-08-19 ENCOUNTER — APPOINTMENT (OUTPATIENT)
Dept: OBGYN | Facility: CLINIC | Age: 34
End: 2024-08-19
Payer: MEDICAID

## 2024-08-19 VITALS — SYSTOLIC BLOOD PRESSURE: 122 MMHG | WEIGHT: 210 LBS | BODY MASS INDEX: 38.41 KG/M2 | DIASTOLIC BLOOD PRESSURE: 76 MMHG

## 2024-08-19 DIAGNOSIS — Z34.92 ENCOUNTER FOR SUPERVISION OF NORMAL PREGNANCY, UNSPECIFIED, SECOND TRIMESTER: ICD-10-CM

## 2024-08-19 PROCEDURE — 99213 OFFICE O/P EST LOW 20 MIN: CPT

## 2024-08-19 PROCEDURE — 76811 OB US DETAILED SNGL FETUS: CPT

## 2024-08-26 ENCOUNTER — APPOINTMENT (OUTPATIENT)
Dept: ANTEPARTUM | Facility: CLINIC | Age: 34
End: 2024-08-26
Payer: MEDICAID

## 2024-08-26 ENCOUNTER — ASOB RESULT (OUTPATIENT)
Age: 34
End: 2024-08-26

## 2024-08-26 PROCEDURE — 76816 OB US FOLLOW-UP PER FETUS: CPT

## 2024-09-09 ENCOUNTER — EMERGENCY (EMERGENCY)
Facility: HOSPITAL | Age: 34
LOS: 1 days | Discharge: OTHER INSTITUTION | End: 2024-09-09
Attending: EMERGENCY MEDICINE
Payer: MEDICAID

## 2024-09-09 ENCOUNTER — OUTPATIENT (OUTPATIENT)
Dept: OUTPATIENT SERVICES | Facility: HOSPITAL | Age: 34
LOS: 1 days | End: 2024-09-09
Payer: MEDICAID

## 2024-09-09 ENCOUNTER — APPOINTMENT (OUTPATIENT)
Dept: OBGYN | Facility: CLINIC | Age: 34
End: 2024-09-09
Payer: MEDICAID

## 2024-09-09 VITALS — DIASTOLIC BLOOD PRESSURE: 62 MMHG | HEART RATE: 76 BPM | SYSTOLIC BLOOD PRESSURE: 113 MMHG | OXYGEN SATURATION: 100 %

## 2024-09-09 VITALS
TEMPERATURE: 98 F | HEART RATE: 80 BPM | DIASTOLIC BLOOD PRESSURE: 58 MMHG | OXYGEN SATURATION: 99 % | RESPIRATION RATE: 19 BRPM | SYSTOLIC BLOOD PRESSURE: 105 MMHG

## 2024-09-09 VITALS
SYSTOLIC BLOOD PRESSURE: 98 MMHG | HEIGHT: 62 IN | TEMPERATURE: 98 F | OXYGEN SATURATION: 100 % | RESPIRATION RATE: 18 BRPM | WEIGHT: 210.1 LBS | DIASTOLIC BLOOD PRESSURE: 61 MMHG | HEART RATE: 89 BPM

## 2024-09-09 VITALS — WEIGHT: 210 LBS | BODY MASS INDEX: 38.41 KG/M2 | DIASTOLIC BLOOD PRESSURE: 62 MMHG | SYSTOLIC BLOOD PRESSURE: 108 MMHG

## 2024-09-09 VITALS — DIASTOLIC BLOOD PRESSURE: 59 MMHG | SYSTOLIC BLOOD PRESSURE: 114 MMHG | HEART RATE: 70 BPM | TEMPERATURE: 98 F

## 2024-09-09 DIAGNOSIS — Z90.49 ACQUIRED ABSENCE OF OTHER SPECIFIED PARTS OF DIGESTIVE TRACT: Chronic | ICD-10-CM

## 2024-09-09 DIAGNOSIS — O26.899 OTHER SPECIFIED PREGNANCY RELATED CONDITIONS, UNSPECIFIED TRIMESTER: ICD-10-CM

## 2024-09-09 DIAGNOSIS — Z34.80 ENCOUNTER FOR SUPERVISION OF OTHER NORMAL PREGNANCY, UNSPECIFIED TRIMESTER: ICD-10-CM

## 2024-09-09 LAB
ALBUMIN SERPL ELPH-MCNC: 3.5 G/DL — SIGNIFICANT CHANGE UP (ref 3.3–5)
ALP SERPL-CCNC: 134 U/L — HIGH (ref 40–120)
ALT FLD-CCNC: 22 U/L — SIGNIFICANT CHANGE UP (ref 10–45)
ANION GAP SERPL CALC-SCNC: 14 MMOL/L — SIGNIFICANT CHANGE UP (ref 5–17)
APPEARANCE UR: ABNORMAL
AST SERPL-CCNC: 20 U/L — SIGNIFICANT CHANGE UP (ref 10–40)
BACTERIA # UR AUTO: ABNORMAL /HPF
BASOPHILS # BLD AUTO: 0.02 K/UL — SIGNIFICANT CHANGE UP (ref 0–0.2)
BASOPHILS NFR BLD AUTO: 0.2 % — SIGNIFICANT CHANGE UP (ref 0–2)
BILIRUB SERPL-MCNC: 0.2 MG/DL — SIGNIFICANT CHANGE UP (ref 0.2–1.2)
BILIRUB UR-MCNC: NEGATIVE — SIGNIFICANT CHANGE UP
BUN SERPL-MCNC: 6 MG/DL — LOW (ref 7–23)
CALCIUM SERPL-MCNC: 9.2 MG/DL — SIGNIFICANT CHANGE UP (ref 8.4–10.5)
CAST: 4 /LPF — SIGNIFICANT CHANGE UP (ref 0–4)
CHLORIDE SERPL-SCNC: 106 MMOL/L — SIGNIFICANT CHANGE UP (ref 96–108)
CO2 SERPL-SCNC: 18 MMOL/L — LOW (ref 22–31)
COLOR SPEC: YELLOW — SIGNIFICANT CHANGE UP
CREAT SERPL-MCNC: 0.45 MG/DL — LOW (ref 0.5–1.3)
DIFF PNL FLD: NEGATIVE — SIGNIFICANT CHANGE UP
EGFR: 131 ML/MIN/1.73M2 — SIGNIFICANT CHANGE UP
EOSINOPHIL # BLD AUTO: 0.06 K/UL — SIGNIFICANT CHANGE UP (ref 0–0.5)
EOSINOPHIL NFR BLD AUTO: 0.7 % — SIGNIFICANT CHANGE UP (ref 0–6)
ERYTHROCYTE [SEDIMENTATION RATE] IN BLOOD: 50 MM/HR — HIGH (ref 0–15)
GLUCOSE SERPL-MCNC: 103 MG/DL — HIGH (ref 70–99)
GLUCOSE UR QL: NEGATIVE MG/DL — SIGNIFICANT CHANGE UP
HCT VFR BLD CALC: 32.1 % — LOW (ref 34.5–45)
HGB BLD-MCNC: 10.5 G/DL — LOW (ref 11.5–15.5)
IMM GRANULOCYTES NFR BLD AUTO: 0.8 % — SIGNIFICANT CHANGE UP (ref 0–0.9)
KETONES UR-MCNC: NEGATIVE MG/DL — SIGNIFICANT CHANGE UP
LEUKOCYTE ESTERASE UR-ACNC: ABNORMAL
LYMPHOCYTES # BLD AUTO: 1.57 K/UL — SIGNIFICANT CHANGE UP (ref 1–3.3)
LYMPHOCYTES # BLD AUTO: 17.7 % — SIGNIFICANT CHANGE UP (ref 13–44)
MCHC RBC-ENTMCNC: 26.4 PG — LOW (ref 27–34)
MCHC RBC-ENTMCNC: 32.7 GM/DL — SIGNIFICANT CHANGE UP (ref 32–36)
MCV RBC AUTO: 80.7 FL — SIGNIFICANT CHANGE UP (ref 80–100)
MONOCYTES # BLD AUTO: 0.43 K/UL — SIGNIFICANT CHANGE UP (ref 0–0.9)
MONOCYTES NFR BLD AUTO: 4.8 % — SIGNIFICANT CHANGE UP (ref 2–14)
NEUTROPHILS # BLD AUTO: 6.73 K/UL — SIGNIFICANT CHANGE UP (ref 1.8–7.4)
NEUTROPHILS NFR BLD AUTO: 75.8 % — SIGNIFICANT CHANGE UP (ref 43–77)
NITRITE UR-MCNC: NEGATIVE — SIGNIFICANT CHANGE UP
NRBC # BLD: 0 /100 WBCS — SIGNIFICANT CHANGE UP (ref 0–0)
PH UR: 5.5 — SIGNIFICANT CHANGE UP (ref 5–8)
PLATELET # BLD AUTO: 229 K/UL — SIGNIFICANT CHANGE UP (ref 150–400)
POTASSIUM SERPL-MCNC: 3.5 MMOL/L — SIGNIFICANT CHANGE UP (ref 3.5–5.3)
POTASSIUM SERPL-SCNC: 3.5 MMOL/L — SIGNIFICANT CHANGE UP (ref 3.5–5.3)
PROT SERPL-MCNC: 6.8 G/DL — SIGNIFICANT CHANGE UP (ref 6–8.3)
PROT UR-MCNC: NEGATIVE MG/DL — SIGNIFICANT CHANGE UP
RBC # BLD: 3.98 M/UL — SIGNIFICANT CHANGE UP (ref 3.8–5.2)
RBC # FLD: 13.6 % — SIGNIFICANT CHANGE UP (ref 10.3–14.5)
RBC CASTS # UR COMP ASSIST: 1 /HPF — SIGNIFICANT CHANGE UP (ref 0–4)
SODIUM SERPL-SCNC: 138 MMOL/L — SIGNIFICANT CHANGE UP (ref 135–145)
SP GR SPEC: 1.02 — SIGNIFICANT CHANGE UP (ref 1–1.03)
SQUAMOUS # UR AUTO: 11 /HPF — HIGH (ref 0–5)
TROPONIN T, HIGH SENSITIVITY RESULT: <6 NG/L — SIGNIFICANT CHANGE UP (ref 0–51)
UROBILINOGEN FLD QL: 0.2 MG/DL — SIGNIFICANT CHANGE UP (ref 0.2–1)
WBC # BLD: 8.88 K/UL — SIGNIFICANT CHANGE UP (ref 3.8–10.5)
WBC # FLD AUTO: 8.88 K/UL — SIGNIFICANT CHANGE UP (ref 3.8–10.5)
WBC UR QL: 11 /HPF — HIGH (ref 0–5)

## 2024-09-09 PROCEDURE — 81001 URINALYSIS AUTO W/SCOPE: CPT

## 2024-09-09 PROCEDURE — 85025 COMPLETE CBC W/AUTO DIFF WBC: CPT

## 2024-09-09 PROCEDURE — 87086 URINE CULTURE/COLONY COUNT: CPT

## 2024-09-09 PROCEDURE — 70544 MR ANGIOGRAPHY HEAD W/O DYE: CPT | Mod: 26,1L,59,MC

## 2024-09-09 PROCEDURE — 85652 RBC SED RATE AUTOMATED: CPT

## 2024-09-09 PROCEDURE — 99212 OFFICE O/P EST SF 10 MIN: CPT | Mod: GC

## 2024-09-09 PROCEDURE — 59025 FETAL NON-STRESS TEST: CPT

## 2024-09-09 PROCEDURE — 96361 HYDRATE IV INFUSION ADD-ON: CPT

## 2024-09-09 PROCEDURE — 81003 URINALYSIS AUTO W/O SCOPE: CPT

## 2024-09-09 PROCEDURE — 70544 MR ANGIOGRAPHY HEAD W/O DYE: CPT | Mod: MC

## 2024-09-09 PROCEDURE — 96374 THER/PROPH/DIAG INJ IV PUSH: CPT

## 2024-09-09 PROCEDURE — 99213 OFFICE O/P EST LOW 20 MIN: CPT

## 2024-09-09 PROCEDURE — 70450 CT HEAD/BRAIN W/O DYE: CPT | Mod: 26,MC

## 2024-09-09 PROCEDURE — 99285 EMERGENCY DEPT VISIT HI MDM: CPT | Mod: 25

## 2024-09-09 PROCEDURE — 84484 ASSAY OF TROPONIN QUANT: CPT

## 2024-09-09 PROCEDURE — 99285 EMERGENCY DEPT VISIT HI MDM: CPT

## 2024-09-09 PROCEDURE — G0463: CPT

## 2024-09-09 PROCEDURE — 80053 COMPREHEN METABOLIC PANEL: CPT

## 2024-09-09 PROCEDURE — 70551 MRI BRAIN STEM W/O DYE: CPT | Mod: MC

## 2024-09-09 PROCEDURE — 93005 ELECTROCARDIOGRAM TRACING: CPT

## 2024-09-09 PROCEDURE — 70450 CT HEAD/BRAIN W/O DYE: CPT | Mod: MC

## 2024-09-09 PROCEDURE — 70551 MRI BRAIN STEM W/O DYE: CPT | Mod: 26,MC

## 2024-09-09 RX ORDER — SODIUM CHLORIDE 9 MG/ML
1000 INJECTION INTRAMUSCULAR; INTRAVENOUS; SUBCUTANEOUS ONCE
Refills: 0 | Status: COMPLETED | OUTPATIENT
Start: 2024-09-09 | End: 2024-09-09

## 2024-09-09 RX ORDER — ACETAMINOPHEN 325 MG/1
650 TABLET ORAL ONCE
Refills: 0 | Status: COMPLETED | OUTPATIENT
Start: 2024-09-09 | End: 2024-09-09

## 2024-09-09 RX ORDER — METOCLOPRAMIDE HCL 5 MG
10 TABLET ORAL ONCE
Refills: 0 | Status: COMPLETED | OUTPATIENT
Start: 2024-09-09 | End: 2024-09-09

## 2024-09-09 RX ADMIN — ACETAMINOPHEN 650 MILLIGRAM(S): 325 TABLET ORAL at 12:09

## 2024-09-09 RX ADMIN — SODIUM CHLORIDE 1000 MILLILITER(S): 9 INJECTION INTRAMUSCULAR; INTRAVENOUS; SUBCUTANEOUS at 12:10

## 2024-09-09 RX ADMIN — ACETAMINOPHEN 650 MILLIGRAM(S): 325 TABLET ORAL at 12:40

## 2024-09-09 RX ADMIN — SODIUM CHLORIDE 1000 MILLILITER(S): 9 INJECTION INTRAMUSCULAR; INTRAVENOUS; SUBCUTANEOUS at 13:10

## 2024-09-09 RX ADMIN — Medication 10 MILLIGRAM(S): at 12:10

## 2024-09-09 NOTE — OB PROVIDER TRIAGE NOTE - HISTORY OF PRESENT ILLNESS
Admission H&P    Subjective  HPI: 35yo  23w5d presenting from the ED for OB evaluations. Patient was at [facility] appointment today and was complaining of SOB, Left sided periorbital pain and L sided facial paresthesia. Her SOB started four days ago and she noticed it more when she was walking and in completely supine position. She went clothe shopping and was concerning because she was winded after walking up the flights of step. She also notices this SOB when she is completely supine.   She also noticed left sided facial tingles and slight numbness around four days ago. She endorses left sided orbital pain in this four day window as well with 8/10 pain when it is at its worst with associated lacrimation. Additionally she states that with the increased orbital pain she has white floaters that dissipate with tylenol. She states that tylenol helps her symptoms of left sided facial paresthesia and left sided orbital pain.   She has never had this sxs of left sided facial tingle, numbness and orbital pain before, but has a history of migraines which are usually characterized HA. She states not current HA, but has had on and off HA for the last four days which improve with Tylenol.   On ROS: Denies fever, chills, CP, SOB or urinary sxs, endorses chronic vaginally itchiness in this pregnancy.  Cards: Denies ctx, abdominal pain, LOF, VB or decreased FM.  PEC: Endorses some left side     – PNC: Denies prenatal issues. GBS _.  EFW _g by sono.  – OBHx:   – GynHx: denies  –Allergies:  – Meds:   – PMH: denies  – PSH: denies  – Psych/Social: denies        Objective  – PE:   CV: RRR  Pulm: breathing comfortably on RA  Abd: gravid, nontender  Extr: moving all extremities with ease  – FS:   – Spec: pooling, nitrazine, ferning, bleeding,  (lesions if patient with HSV2 history)  – VE: //  – FHT: baseline 1, mod variability, +accels, -decels  – [place]: qmin  – EFW: _g by sono  – Sono: vertex    Assessment  – No prenatal issues. GBS _.    Plan  1. Admit to LND. Routine Labs. IVF.  2. Expectant management/IOL w/  3. Fetus: cat 1 tracing. VTX. EFW _g by sono. Continuous EFM. Sono. No concerns.  4. Prenatal issues: none  5. GBS _  6. Pain: IV pain meds/epidural PRN    Patient discussed with attending physician, Dr. Edy Capps MD PGY2 Admission H&P    Subjective  HPI: 35yo  23w5d presenting from the ED for OB evaluations. Patient was at [facility] appointment today and was complaining of SOB, Left sided periorbital pain and L sided facial paresthesia. Her SOB started four days ago and she noticed it more when she was walking and in completely supine position. She went clothe shopping and was concerning because she was winded after walking up the flights of step. She also notices this SOB when she is completely supine.   She also noticed left sided facial tingles and slight numbness around four days ago. She endorses left sided orbital pain in this four day window as well with 8/10 pain when it is at its worst with associated lacrimation. Additionally she states that with the increased orbital pain she has white floaters that dissipate with tylenol. She states that tylenol helps her symptoms of left sided facial paresthesia and left sided orbital pain.   She has never had this sxs of left sided facial tingle, numbness and orbital pain before, but has a history of migraines which are usually characterized HA. She states not current HA, but has had on and off HA for the last four days which improve with Tylenol.   On ROS: Denies fever, chills, CP, SOB or urinary sxs, endorses chronic vaginally itchiness in this pregnancy.  Cards: Denies ctx, abdominal pain, LOF, VB or decreased FM.  PEC: Endorses some left side white floaters, no blurry vision     – PNC: [facility] no acute issues in pregnancy.     POB:  NSVDx4 (, , , ) had unspecified hypertensive disorder in two pregnancies.   Pgyn: Denies fibroids, cysts, endometriosis, STI's, Abnormal pap smears   PMHX: Hx of migraines and pyelonephritis is 2 prior pregnancies.   PSHx: Lap Cholecystectomy   Meds: ASA, PNV  All: NKDA  Social History:  Denies smoking use, drug use, alcohol use.

## 2024-09-09 NOTE — ED PROVIDER NOTE - CLINICAL SUMMARY MEDICAL DECISION MAKING FREE TEXT BOX
34F hx migraines at 24 weeks gestation here with multiple complaints including visual changes, L periorbital HA and facial tingling. Dec sensation to L face, no skin rashes. EOMI, No proptosis, no conjunctival injection or corneal clouding, No temp TTP. Possibly migrainous, R/o DVST given HA in pregnancy w sensory and visual changes. Lower suspicion for GCA. Pt also noting SOB/GIRARD though HR is 72, /70, sat 100% RA, no inc WOB, no LE edema, Lungs CTA. Possibly anemia, vs pulm changes of pregnancy, low suspicion for PE given VS and PE findings. Low suspicion for ACS- no CP and low risk. Check CXR, EKG, trop. OB consulted for FM and eval given c/o pelvic pressure and 24 weeks gestation.

## 2024-09-09 NOTE — ED ADULT NURSE NOTE - OBJECTIVE STATEMENT
Patient  is  alert  and  oriented x4. Color is  good  and  skin warm  to touch.  She  is  c/o SOB , abdominal pain  and left  facial numbness.  Patient  is  24  weeks  pregnant.

## 2024-09-09 NOTE — ED PROVIDER NOTE - PHYSICAL EXAMINATION
Gen: WNWD NAD  HEENT: NCAT PERRL EOMI normal pharynx  Neck: supple  CV: RRR, no murmur  Lung: CTA BL  Abd: +BS soft NTND  Ext: wwp, palp pulses, FROMx4, no calf swelling or TTP   Neuro: A&Ox3, CN grossly intact, subj dec sensation to L side of face , motor 5/5 throughout

## 2024-09-09 NOTE — OB PROVIDER TRIAGE NOTE - ATTENDING COMMENTS
OB attg note    35yo  at 23+5 here for unilateral eye pain and facial numbness and mild SOB and abd cramping. Neuro sx suspicious for migraine vs cluster HA, pt reports sx have lessened. CT head and MRI neg for intracranial pathology. VSS, no other cardiac symptoms, likely physiologic SOB in pregnancy. No e/o PTL given acontractile on toco and CL wnl. Stable for dc home recommend neuro f/u outpt.    Lebron MALAVE

## 2024-09-09 NOTE — OB PROVIDER TRIAGE NOTE - NSHPPHYSICALEXAM_GEN_ALL_CORE
Objective  – PE:   Head: No smile or eyebrow raise deficits. Decreased sensation around left check and some tenderness around Left orbital region.   CV: RRR  Pulm: breathing comfortably on RA  Abd: gravid, nontender  Extr: moving all extremities with ease    – Spec: wnl cervix not visualized  – VE: 0/0/-3  – FHT: baseline 130, mod variability, +accels, -decels  – [place]: N/A  – Sono: Breech 2x2 pocket and gross fetal movement seen. TVUS CL 4.5->5.1 with no funneling.

## 2024-09-09 NOTE — OB PROVIDER TRIAGE NOTE - NSOBPROVIDERNOTE_OBGYN_ALL_OB_FT
Assessment  – 33yo  23w5d presenting from the ED for OB evaluations. Patient was at [facility] appointment today and was complaining of SOB, Left sided periorbital pain with white floaters and L sided facial paresthesia. CT head and MRI of brain negative and L sided facial sxs and floaters  improved with Tylenol. HELLP labs wnl and neg Trop. Patient with no SOB now and was r/o for labor with CL >4.5 and no contractions on toco with cervix closed on exam.     Plan  -CT Head and Brain MRI neg. Will plan for clinic appt this week to schedule outpatient neuro appt.  -Neg HELLP labs, trops and vitals wnl.  -No labor sxs, return precautions given.  -Patient verbalized understanding that she would represent to care if facial sxs do not improve.     Patient discussed with attending physician, Dr. Godoy.    Madi Capps MD PGY3

## 2024-09-09 NOTE — OB PROVIDER TRIAGE NOTE - NS_DISPOSITION_OBGYN_ALL_OB
1000:  Cath holding summary     Patient escorted to cath holding from waiting area ambulatory, alert and oriented x 4, voicing no complaints. Changed into gown and placed on monitor. NPO since MN. Lab results, med rec and H&P reviewed on chart. PIV x 2 inserted without difficulty. .     1056:    TRANSFER - OUT REPORT:    Verbal report given to Kenneth Nixon (name) on [address] [place] [address].  being transferred to Cath Lab (unit) for ordered procedure       Report consisted of patients Situation, Background, Assessment and   Recommendations(SBAR). Information from the following report(s) SBAR, Procedure Summary, Intake/Output, MAR and Recent Results was reviewed with the receiving nurse. Lines:   Peripheral IV 12/01/20 Anterior;Proximal;Right Forearm (Active)   Site Assessment Clean, dry, & intact 12/01/20 1044   Phlebitis Assessment 0 12/01/20 1044   Infiltration Assessment 0 12/01/20 1044   Dressing Status Clean, dry, & intact 12/01/20 1044   Dressing Type Transparent 12/01/20 1044   Hub Color/Line Status Blue;Patent; Flushed;Capped 12/01/20 1044   Alcohol Cap Used Yes 12/01/20 1044       Peripheral IV 12/01/20 Left Antecubital (Active)   Site Assessment Clean, dry, & intact 12/01/20 1044   Phlebitis Assessment 0 12/01/20 1044   Infiltration Assessment 0 12/01/20 1044   Dressing Status Clean, dry, & intact 12/01/20 1044   Dressing Type Transparent 12/01/20 1044   Hub Color/Line Status Pink;Patent; Flushed;Capped 12/01/20 1044   Alcohol Cap Used Yes 12/01/20 1044        Opportunity for questions and clarification was provided. Patient transported with:   Ivone Norwood:    TRANSFER - IN REPORT:    Verbal report received from Beata Javier, [facility] (name) on [address] [place] [address].  being received from Cath Lab (unit) for routine post - op      Report consisted of patients Situation, Background, Assessment and   Recommendations(SBAR).      Information from the following report(s) SBAR, Procedure Summary, Intake/Output, MAR and Recent Results was reviewed with the receiving nurse. Opportunity for questions and clarification was provided. Assessment completed upon patients arrival to unit and care assumed. 1212:    Sheath pulled per policy without complication. No bleeding or hematoma at site. Hemostatic dressing and Tegaderm applied. 1700:    AVS Discharge instructions reviewed with patient and copy given to patient. All questions answered. Patient verbalized understanding to all discharge instructions. PIV removed. Procedural site within normal limits. No hematoma or bleeding noted from procedural and PIV site. No pain noted at discharge. Patient discharged with support person in stable condition. Escorted out to vehicle for transport home. Continue to Observe

## 2024-09-09 NOTE — CONSULT NOTE ADULT - SUBJECTIVE AND OBJECTIVE BOX
JOHN MORROW  34y  Female [id number]    HPI: 33yo  @23w5d sent in from low risk OB clinic to ED due to complains of headache, left eye and facial pain. Pt reports having intermittent headaches that started 4 days ago, typically resolves with tylenol. She developed the left eye pain and facial pain a day after. Pt also reports lower abdominal cramping that wax and wanes and well as SOB with exertion for the last few weeks. She denies any vision changes, recent sick contacts, facial drooping or eye tearing, fever chills, n/v, bowel or urinary changes. She denies any vaginal bleeding, contractions, loss of fluid and reports + fetal movement.       POB:  NSVDx4 (, , , )  Pgyn: Denies fibroids, cysts, endometriosis, STI's, Abnormal pap smears   PMHX: denies  PSHx: Lap Cholecystectomy   Meds: ASA, PNV  All: NKDA  Social History:  Denies smoking use, drug use, alcohol use.      Vital Signs Last 24 Hrs  T(C): 36.9 (09 Sep 2024 12:17), Max: 36.9 (09 Sep 2024 10:56)  T(F): 98.4 (09 Sep 2024 12:17), Max: 98.5 (09 Sep 2024 10:56)  HR: 88 (09 Sep 2024 12:17) (88 - 89)  BP: 119/68 (09 Sep 2024 12:17) (98/61 - 119/68)  BP(mean): --  RR: 18 (09 Sep 2024 12:17) (18 - 18)  SpO2: 98% (09 Sep 2024 12:17) (98% - 100%)    Parameters below as of 09 Sep 2024 12:17  Patient On (Oxygen Delivery Method): room air        Physical Exam:   General: sitting comfortably in bed, NAD   MENTAL STATUS: AAOx3, memory intact, fund of knowledge appropriate  HEENT: neck supple, full ROM  Cranial nerves  II: Pupils equal and reactive, no RAPD, no VF deficits  III, IV, VI: EOM intact, no gaze preference or deviation, no nystagmus.  V: normal sensation in V1, V2, and V3 segments bilaterally  VII: no asymmetry, no nasolabial fold flattening  VIII: normal hearing to speech  CV: well perfused  Lungs: saturating well on RA  Back: No CVA tenderness  Abd: Soft, non-distended  mild suprapubic tenderness on deep palpation    :  No bleeding on pad  Ext: non-tender b/l, no edema. Able to move all extremities without difficulty    LABS:                              10.5   8.88  )-----------( 229      ( 09 Sep 2024 12:15 )             32.1           I&O's Detail      Urinalysis Basic - ( 09 Sep 2024 12:17 )    Color: Yellow / Appearance: Cloudy / S.017 / pH: x  Gluc: x / Ketone: Negative mg/dL  / Bili: Negative / Urobili: 0.2 mg/dL   Blood: x / Protein: Negative mg/dL / Nitrite: Negative   Leuk Esterase: Small / RBC: 1 /HPF / WBC 11 /HPF   Sq Epi: x / Non Sq Epi: 11 /HPF / Bacteria: Moderate /HPF

## 2024-09-09 NOTE — CONSULT NOTE ADULT - ASSESSMENT
35yo  @23w5d sent in from clinic to ED due to complains of headache, left eye, facial pain, and SOB on exertion. Vitals signs stable. Physical exam unremarkable for any neurological deficits. Giving clinical picture, low suspicion for PE or stroke    - Agree with MRI   - Tylenol PRN  - Monitor VS  - Can transfer patient to OB triage for r/o  labor   pending imaging and lab workup     Josseline Rucker PGY2  Discussed with Dr. Cabezas

## 2024-09-09 NOTE — ED CLERICAL - NS ED CLERK NOTE PRE-ARRIVAL INFORMATION; ADDITIONAL PRE-ARRIVAL INFORMATION
CC/Reason For referral: 23.5 weeks pregnant. visual disturbance left eye will left sided facial numbness  Preferred Consultant(if applicable): gyn resident  Who admits for you (if needed):  Do you have documents you would like to fax over?  Would you still like to speak to an ED attending?  Please call gyn resident
28-May-2017

## 2024-09-09 NOTE — OB PROVIDER TRIAGE NOTE - NSHPLABSRESULTS_GEN_ALL_CORE
ACC: [id number] EXAM:  MR VENOGRAM BRAIN   ORDERED BY: LEE DIGGS     ACC: [id number] EXAM:  MR BRAIN   ORDERED BY: LEE DIGGS     PROCEDURE DATE:  09/09/2024          INTERPRETATION:  HISTORY: Headache during pregnancy.    COMPARISON:  CT head 9/9/2024 2:08 PM    TECHNIQUE:  On a 1.5 T MRI scanner, sagittal T1, axial T1, T2, GRE, FLAIR   and DWI sequences of the brain were performed and submitted for   interpretation. MR venogram was also obtained.    FINDINGS:    MRI BRAIN:    The signal intensity of the cerebrum, brainstem, and cerebellum is within   normal limits.    There is no acute infarction, mass, hemorrhage, extra-axial fluid   collection, or shift of midline structures. The ventricles and sulci are   normal size for patient's stated age without of hydrocephalus. Basal   cisterns are clear.    Flow-voids are present in the major intracranial vessels, indicating   patency.    The paranasal sinuses and mastoid air cells are clear.    MRV BRAIN:    The dural venous sinuses, including the superior sagittal sinus and   bilateral transverse and sigmoid sinuses, are patent without filling   defect or evidence of thrombus.    Dominant right transverse and sigmoid sinus drainage system into the   right internal jugular vein. The proximal internal jugular veins are   patent bilaterally.    The deep veins, including the internal cerebral veins, vein of Ricardo,   straight sinus draining into the torcula are patent.    IMPRESSION:    MRI BRAIN: No acute infarction, bleeding, or shift.    MRV BRAIN: Patent dural venous sinuses.    --- End of Report ---      ACC: [id number] EXAM:  CT BRAIN   ORDERED BY: LEE DIGGS     PROCEDURE DATE:  09/09/2024          INTERPRETATION:  CLINICAL INFORMATION: Headache in 24 weeks pregnant      5mm axial sections of the brain were obtained from base to vertex,   without the intravenous administration of contrast material. Coronal and   sagittal computer generated reconstructed views are available.    Comparison is made with prior CT of 6/14/2018 and demonstrates no   significant interval change.    The fourth, third and lateral ventricles are normal size and position.   There is no hemorrhage, mass or shift of the midline structures. There is   normal gray white matter differentiation. Bone window examination is   unremarkable. The visualized paranasal sinuses and tympanomastoid   cavities are clear.    IMPRESSION: Unremarkable noncontrast CT of the brain. No change from   6/14/2018. No hemorrhage.    --- End of Report ---

## 2024-09-09 NOTE — CONSULT NOTE ADULT - ATTENDING COMMENTS
Obstetrical  8am-6pm:  Patient neither saw nor examined by me.  Agree with above resident note.  Teto MALAVE

## 2024-09-09 NOTE — ED ADULT NURSE REASSESSMENT NOTE - NS ED NURSE REASSESS COMMENT FT1
#[id number] Called Charge KINA Kaufman regarding pt case. States to keep patient in ED for medical clearance. States OB will consult.

## 2024-09-09 NOTE — ED PROVIDER NOTE - PROGRESS NOTE DETAILS
POCUS showed FHR of 150, and visual acuity testing was 20/20 in both eyes. Patient denies eye glasses but admits to some blurry vision at the moment. Dr. Mitchell: Pt not scheduled for MRI until 7pm. CTH non actionable. Spoke w OB and MRI, Ok to send pt up to L&D triage for fetal monitoring and await MRI there. Cancelled AOU order as transport just came for MRI. MRI done, will MRI done, will be sent to L&D

## 2024-09-09 NOTE — ED ADULT TRIAGE NOTE - CHIEF COMPLAINT QUOTE
24 wks pregnant   complaining of L side facial numbness and shortness of breath x 3 days.   Endorses lower abd pain.   Denies vaginal bleeding, discharge.

## 2024-09-10 LAB
CULTURE RESULTS: NO GROWTH — SIGNIFICANT CHANGE UP
SPECIMEN SOURCE: SIGNIFICANT CHANGE UP

## 2024-09-11 DIAGNOSIS — G43.909 MIGRAINE, UNSPECIFIED, NOT INTRACTABLE, WITHOUT STATUS MIGRAINOSUS: ICD-10-CM

## 2024-09-11 DIAGNOSIS — O99.891 OTHER SPECIFIED DISEASES AND CONDITIONS COMPLICATING PREGNANCY: ICD-10-CM

## 2024-09-11 DIAGNOSIS — R10.9 UNSPECIFIED ABDOMINAL PAIN: ICD-10-CM

## 2024-09-11 DIAGNOSIS — R20.2 PARESTHESIA OF SKIN: ICD-10-CM

## 2024-09-11 DIAGNOSIS — O26.892 OTHER SPECIFIED PREGNANCY RELATED CONDITIONS, SECOND TRIMESTER: ICD-10-CM

## 2024-09-11 DIAGNOSIS — Z87.59 PERSONAL HISTORY OF OTHER COMPLICATIONS OF PREGNANCY, CHILDBIRTH AND THE PUERPERIUM: ICD-10-CM

## 2024-09-11 DIAGNOSIS — Z3A.23 23 WEEKS GESTATION OF PREGNANCY: ICD-10-CM

## 2024-09-11 DIAGNOSIS — H57.12 OCULAR PAIN, LEFT EYE: ICD-10-CM

## 2024-09-11 DIAGNOSIS — R06.02 SHORTNESS OF BREATH: ICD-10-CM

## 2024-09-13 ENCOUNTER — MED ADMIN CHARGE (OUTPATIENT)
Age: 34
End: 2024-09-13

## 2024-09-13 ENCOUNTER — OUTPATIENT (OUTPATIENT)
Dept: OUTPATIENT SERVICES | Facility: HOSPITAL | Age: 34
LOS: 1 days | End: 2024-09-13
Payer: MEDICAID

## 2024-09-13 ENCOUNTER — APPOINTMENT (OUTPATIENT)
Dept: OBGYN | Facility: CLINIC | Age: 34
End: 2024-09-13

## 2024-09-13 VITALS — SYSTOLIC BLOOD PRESSURE: 118 MMHG | WEIGHT: 209 LBS | DIASTOLIC BLOOD PRESSURE: 80 MMHG | BODY MASS INDEX: 38.23 KG/M2

## 2024-09-13 DIAGNOSIS — Z34.92 ENCOUNTER FOR SUPERVISION OF NORMAL PREGNANCY, UNSPECIFIED, SECOND TRIMESTER: ICD-10-CM

## 2024-09-13 DIAGNOSIS — Z34.90 ENCOUNTER FOR SUPERVISION OF NORMAL PREGNANCY, UNSPECIFIED, UNSPECIFIED TRIMESTER: ICD-10-CM

## 2024-09-13 DIAGNOSIS — Z34.80 ENCOUNTER FOR SUPERVISION OF OTHER NORMAL PREGNANCY, UNSPECIFIED TRIMESTER: ICD-10-CM

## 2024-09-13 DIAGNOSIS — Z90.49 ACQUIRED ABSENCE OF OTHER SPECIFIED PARTS OF DIGESTIVE TRACT: Chronic | ICD-10-CM

## 2024-09-13 PROCEDURE — 90471 IMMUNIZATION ADMIN: CPT

## 2024-09-13 PROCEDURE — 81003 URINALYSIS AUTO W/O SCOPE: CPT

## 2024-09-13 PROCEDURE — 90656 IIV3 VACC NO PRSV 0.5 ML IM: CPT

## 2024-09-13 PROCEDURE — 99213 OFFICE O/P EST LOW 20 MIN: CPT | Mod: 25

## 2024-09-13 PROCEDURE — G0463: CPT

## 2024-09-20 DIAGNOSIS — Z34.90 ENCOUNTER FOR SUPERVISION OF NORMAL PREGNANCY, UNSPECIFIED, UNSPECIFIED TRIMESTER: ICD-10-CM

## 2024-09-20 DIAGNOSIS — Z34.92 ENCOUNTER FOR SUPERVISION OF NORMAL PREGNANCY, UNSPECIFIED, SECOND TRIMESTER: ICD-10-CM

## 2024-09-20 DIAGNOSIS — Z23 ENCOUNTER FOR IMMUNIZATION: ICD-10-CM

## 2024-09-23 ENCOUNTER — OUTPATIENT (OUTPATIENT)
Dept: OUTPATIENT SERVICES | Facility: HOSPITAL | Age: 34
LOS: 1 days | End: 2024-09-23
Payer: MEDICAID

## 2024-09-23 ENCOUNTER — NON-APPOINTMENT (OUTPATIENT)
Age: 34
End: 2024-09-23

## 2024-09-23 ENCOUNTER — LABORATORY RESULT (OUTPATIENT)
Age: 34
End: 2024-09-23

## 2024-09-23 ENCOUNTER — APPOINTMENT (OUTPATIENT)
Dept: OBGYN | Facility: CLINIC | Age: 34
End: 2024-09-23
Payer: MEDICAID

## 2024-09-23 ENCOUNTER — APPOINTMENT (OUTPATIENT)
Dept: ANTEPARTUM | Facility: CLINIC | Age: 34
End: 2024-09-23
Payer: MEDICAID

## 2024-09-23 ENCOUNTER — ASOB RESULT (OUTPATIENT)
Age: 34
End: 2024-09-23

## 2024-09-23 VITALS — WEIGHT: 208 LBS | SYSTOLIC BLOOD PRESSURE: 112 MMHG | DIASTOLIC BLOOD PRESSURE: 76 MMHG | BODY MASS INDEX: 38.04 KG/M2

## 2024-09-23 DIAGNOSIS — Z90.49 ACQUIRED ABSENCE OF OTHER SPECIFIED PARTS OF DIGESTIVE TRACT: Chronic | ICD-10-CM

## 2024-09-23 DIAGNOSIS — Z34.92 ENCOUNTER FOR SUPERVISION OF NORMAL PREGNANCY, UNSPECIFIED, SECOND TRIMESTER: ICD-10-CM

## 2024-09-23 PROCEDURE — 99213 OFFICE O/P EST LOW 20 MIN: CPT | Mod: 25

## 2024-09-23 PROCEDURE — 76816 OB US FOLLOW-UP PER FETUS: CPT

## 2024-09-24 ENCOUNTER — NON-APPOINTMENT (OUTPATIENT)
Age: 34
End: 2024-09-24

## 2024-09-24 DIAGNOSIS — Z34.92 ENCOUNTER FOR SUPERVISION OF NORMAL PREGNANCY, UNSPECIFIED, SECOND TRIMESTER: ICD-10-CM

## 2024-09-24 DIAGNOSIS — Z34.80 ENCOUNTER FOR SUPERVISION OF OTHER NORMAL PREGNANCY, UNSPECIFIED TRIMESTER: ICD-10-CM

## 2024-09-24 DIAGNOSIS — R73.09 OTHER ABNORMAL GLUCOSE: ICD-10-CM

## 2024-09-24 LAB — GLUCOSE 1H P MEAL SERPL-MCNC: 160 MG/DL — HIGH (ref 70–134)

## 2024-09-25 ENCOUNTER — LABORATORY RESULT (OUTPATIENT)
Age: 34
End: 2024-09-25

## 2024-09-26 LAB
GESTATIONAL GTT PNL UR+SERPL: 80 MG/DL — SIGNIFICANT CHANGE UP (ref 70–94)
GLUCOSE 1H P CHAL SERPL-MCNC: 169 MG/DL — SIGNIFICANT CHANGE UP (ref 70–179)
GTT GEST 2H PNL UR+SERPL: 135 MG/DL — SIGNIFICANT CHANGE UP (ref 70–154)
GTT GEST 3H PNL SERPL: 87 MG/DL — SIGNIFICANT CHANGE UP (ref 70–139)

## 2024-10-14 ENCOUNTER — LABORATORY RESULT (OUTPATIENT)
Age: 34
End: 2024-10-14

## 2024-10-14 ENCOUNTER — APPOINTMENT (OUTPATIENT)
Dept: OBGYN | Facility: CLINIC | Age: 34
End: 2024-10-14
Payer: MEDICAID

## 2024-10-14 ENCOUNTER — OUTPATIENT (OUTPATIENT)
Dept: OUTPATIENT SERVICES | Facility: HOSPITAL | Age: 34
LOS: 1 days | End: 2024-10-14
Payer: MEDICAID

## 2024-10-14 VITALS — SYSTOLIC BLOOD PRESSURE: 100 MMHG | BODY MASS INDEX: 39.14 KG/M2 | WEIGHT: 214 LBS | DIASTOLIC BLOOD PRESSURE: 60 MMHG

## 2024-10-14 DIAGNOSIS — Z34.80 ENCOUNTER FOR SUPERVISION OF OTHER NORMAL PREGNANCY, UNSPECIFIED TRIMESTER: ICD-10-CM

## 2024-10-14 DIAGNOSIS — Z90.49 ACQUIRED ABSENCE OF OTHER SPECIFIED PARTS OF DIGESTIVE TRACT: Chronic | ICD-10-CM

## 2024-10-14 DIAGNOSIS — Z34.93 ENCOUNTER FOR SUPERVISION OF NORMAL PREGNANCY, UNSPECIFIED, THIRD TRIMESTER: ICD-10-CM

## 2024-10-14 DIAGNOSIS — O99.019 ANEMIA COMPLICATING PREGNANCY, UNSPECIFIED TRIMESTER: ICD-10-CM

## 2024-10-14 DIAGNOSIS — R20.0 ANESTHESIA OF SKIN: ICD-10-CM

## 2024-10-14 DIAGNOSIS — K52.9 NONINFECTIVE GASTROENTERITIS AND COLITIS, UNSPECIFIED: ICD-10-CM

## 2024-10-14 LAB
HCT VFR BLD CALC: 32.6 % — LOW (ref 34.5–45)
HGB BLD-MCNC: 10.7 G/DL — LOW (ref 11.5–15.5)
MCHC RBC-ENTMCNC: 26.5 PG — LOW (ref 27–34)
MCHC RBC-ENTMCNC: 32.8 GM/DL — SIGNIFICANT CHANGE UP (ref 32–36)
MCV RBC AUTO: 80.7 FL — SIGNIFICANT CHANGE UP (ref 80–100)
PLATELET # BLD AUTO: 250 K/UL — SIGNIFICANT CHANGE UP (ref 150–400)
RBC # BLD: 4.04 M/UL — SIGNIFICANT CHANGE UP (ref 3.8–5.2)
RBC # FLD: 14 % — SIGNIFICANT CHANGE UP (ref 10.3–14.5)
WBC # BLD: 8.98 K/UL — SIGNIFICANT CHANGE UP (ref 3.8–10.5)
WBC # FLD AUTO: 8.98 K/UL — SIGNIFICANT CHANGE UP (ref 3.8–10.5)

## 2024-10-14 PROCEDURE — 36415 COLL VENOUS BLD VENIPUNCTURE: CPT

## 2024-10-14 PROCEDURE — 86780 TREPONEMA PALLIDUM: CPT

## 2024-10-14 PROCEDURE — 99213 OFFICE O/P EST LOW 20 MIN: CPT | Mod: 25

## 2024-10-14 PROCEDURE — 82952 GTT-ADDED SAMPLES: CPT

## 2024-10-14 PROCEDURE — 81003 URINALYSIS AUTO W/O SCOPE: CPT

## 2024-10-14 PROCEDURE — 82950 GLUCOSE TEST: CPT

## 2024-10-14 PROCEDURE — G0463: CPT

## 2024-10-14 PROCEDURE — 82951 GLUCOSE TOLERANCE TEST (GTT): CPT

## 2024-10-14 PROCEDURE — 85027 COMPLETE CBC AUTOMATED: CPT

## 2024-10-15 PROBLEM — O99.019 ANEMIA AFFECTING PREGNANCY, ANTEPARTUM: Status: ACTIVE | Noted: 2024-10-15

## 2024-10-15 LAB — T PALLIDUM AB TITR SER: NEGATIVE — SIGNIFICANT CHANGE UP

## 2024-10-16 RX ORDER — MULTIVIT-MIN/IRON/FOLIC ACID/K 18-600-40
500 CAPSULE ORAL
Qty: 1 | Refills: 2 | Status: ACTIVE | COMMUNITY
Start: 2024-10-15 | End: 1900-01-01

## 2024-10-16 RX ORDER — CHLORHEXIDINE GLUCONATE 4 %
325 (65 FE) LIQUID (ML) TOPICAL TWICE DAILY
Qty: 30 | Refills: 0 | Status: ACTIVE | COMMUNITY
Start: 2024-10-15 | End: 1900-01-01

## 2024-10-21 ENCOUNTER — ASOB RESULT (OUTPATIENT)
Age: 34
End: 2024-10-21

## 2024-10-21 ENCOUNTER — APPOINTMENT (OUTPATIENT)
Dept: ANTEPARTUM | Facility: CLINIC | Age: 34
End: 2024-10-21
Payer: MEDICAID

## 2024-10-21 ENCOUNTER — NON-APPOINTMENT (OUTPATIENT)
Age: 34
End: 2024-10-21

## 2024-10-21 PROCEDURE — 76816 OB US FOLLOW-UP PER FETUS: CPT

## 2024-10-21 PROCEDURE — 76819 FETAL BIOPHYS PROFIL W/O NST: CPT | Mod: 59

## 2024-10-22 ENCOUNTER — APPOINTMENT (OUTPATIENT)
Dept: GASTROENTEROLOGY | Facility: CLINIC | Age: 34
End: 2024-10-22

## 2024-10-31 ENCOUNTER — NON-APPOINTMENT (OUTPATIENT)
Age: 34
End: 2024-10-31

## 2024-11-04 ENCOUNTER — LABORATORY RESULT (OUTPATIENT)
Age: 34
End: 2024-11-04

## 2024-11-04 ENCOUNTER — APPOINTMENT (OUTPATIENT)
Dept: OBGYN | Facility: CLINIC | Age: 34
End: 2024-11-04
Payer: MEDICAID

## 2024-11-04 ENCOUNTER — MED ADMIN CHARGE (OUTPATIENT)
Age: 34
End: 2024-11-04

## 2024-11-04 ENCOUNTER — OUTPATIENT (OUTPATIENT)
Dept: OUTPATIENT SERVICES | Facility: HOSPITAL | Age: 34
LOS: 1 days | End: 2024-11-04
Payer: MEDICAID

## 2024-11-04 ENCOUNTER — NON-APPOINTMENT (OUTPATIENT)
Age: 34
End: 2024-11-04

## 2024-11-04 VITALS — SYSTOLIC BLOOD PRESSURE: 112 MMHG | WEIGHT: 214 LBS | BODY MASS INDEX: 39.14 KG/M2 | DIASTOLIC BLOOD PRESSURE: 64 MMHG

## 2024-11-04 DIAGNOSIS — Z23 ENCOUNTER FOR IMMUNIZATION: ICD-10-CM

## 2024-11-04 DIAGNOSIS — Z34.80 ENCOUNTER FOR SUPERVISION OF OTHER NORMAL PREGNANCY, UNSPECIFIED TRIMESTER: ICD-10-CM

## 2024-11-04 DIAGNOSIS — R39.9 UNSPECIFIED SYMPTOMS AND SIGNS INVOLVING THE GENITOURINARY SYSTEM: ICD-10-CM

## 2024-11-04 DIAGNOSIS — Z90.49 ACQUIRED ABSENCE OF OTHER SPECIFIED PARTS OF DIGESTIVE TRACT: Chronic | ICD-10-CM

## 2024-11-04 LAB
BILIRUB UR QL STRIP: NORMAL
CLARITY UR: CLEAR
COLLECTION METHOD: NORMAL
GLUCOSE UR-MCNC: NORMAL
HCG UR QL: 1 EU/DL
HGB UR QL STRIP.AUTO: NORMAL
KETONES UR-MCNC: NORMAL
LEUKOCYTE ESTERASE UR QL STRIP: NORMAL
NITRITE UR QL STRIP: NORMAL
PH UR STRIP: 6.5
PROT UR STRIP-MCNC: NORMAL
SP GR UR STRIP: 1.01

## 2024-11-04 PROCEDURE — 81002 URINALYSIS NONAUTO W/O SCOPE: CPT

## 2024-11-04 PROCEDURE — 81003 URINALYSIS AUTO W/O SCOPE: CPT

## 2024-11-04 PROCEDURE — G0463: CPT

## 2024-11-04 PROCEDURE — 99213 OFFICE O/P EST LOW 20 MIN: CPT | Mod: 25

## 2024-11-04 PROCEDURE — 87086 URINE CULTURE/COLONY COUNT: CPT

## 2024-11-04 PROCEDURE — 90715 TDAP VACCINE 7 YRS/> IM: CPT

## 2024-11-04 PROCEDURE — 90471 IMMUNIZATION ADMIN: CPT

## 2024-11-05 LAB
CULTURE RESULTS: SIGNIFICANT CHANGE UP
SPECIMEN SOURCE: SIGNIFICANT CHANGE UP

## 2024-11-15 DIAGNOSIS — Z23 ENCOUNTER FOR IMMUNIZATION: ICD-10-CM

## 2024-11-15 DIAGNOSIS — Z34.90 ENCOUNTER FOR SUPERVISION OF NORMAL PREGNANCY, UNSPECIFIED, UNSPECIFIED TRIMESTER: ICD-10-CM

## 2024-11-15 DIAGNOSIS — Z34.93 ENCOUNTER FOR SUPERVISION OF NORMAL PREGNANCY, UNSPECIFIED, THIRD TRIMESTER: ICD-10-CM

## 2024-11-16 ENCOUNTER — NON-APPOINTMENT (OUTPATIENT)
Age: 34
End: 2024-11-16

## 2024-11-19 ENCOUNTER — NON-APPOINTMENT (OUTPATIENT)
Age: 34
End: 2024-11-19

## 2024-11-20 ENCOUNTER — APPOINTMENT (OUTPATIENT)
Dept: ANTEPARTUM | Facility: CLINIC | Age: 34
End: 2024-11-20
Payer: MEDICAID

## 2024-11-20 ENCOUNTER — OUTPATIENT (OUTPATIENT)
Dept: OUTPATIENT SERVICES | Facility: HOSPITAL | Age: 34
LOS: 1 days | End: 2024-11-20
Payer: MEDICAID

## 2024-11-20 ENCOUNTER — MED ADMIN CHARGE (OUTPATIENT)
Age: 34
End: 2024-11-20

## 2024-11-20 ENCOUNTER — ASOB RESULT (OUTPATIENT)
Age: 34
End: 2024-11-20

## 2024-11-20 ENCOUNTER — APPOINTMENT (OUTPATIENT)
Dept: OBGYN | Facility: CLINIC | Age: 34
End: 2024-11-20
Payer: MEDICAID

## 2024-11-20 VITALS — BODY MASS INDEX: 39.14 KG/M2 | DIASTOLIC BLOOD PRESSURE: 78 MMHG | SYSTOLIC BLOOD PRESSURE: 118 MMHG | WEIGHT: 214 LBS

## 2024-11-20 DIAGNOSIS — Z34.90 ENCOUNTER FOR SUPERVISION OF NORMAL PREGNANCY, UNSPECIFIED, UNSPECIFIED TRIMESTER: ICD-10-CM

## 2024-11-20 DIAGNOSIS — Z90.49 ACQUIRED ABSENCE OF OTHER SPECIFIED PARTS OF DIGESTIVE TRACT: Chronic | ICD-10-CM

## 2024-11-20 DIAGNOSIS — Z34.80 ENCOUNTER FOR SUPERVISION OF OTHER NORMAL PREGNANCY, UNSPECIFIED TRIMESTER: ICD-10-CM

## 2024-11-20 DIAGNOSIS — Z34.93 ENCOUNTER FOR SUPERVISION OF NORMAL PREGNANCY, UNSPECIFIED, THIRD TRIMESTER: ICD-10-CM

## 2024-11-20 DIAGNOSIS — Z23 ENCOUNTER FOR IMMUNIZATION: ICD-10-CM

## 2024-11-20 PROCEDURE — 99213 OFFICE O/P EST LOW 20 MIN: CPT | Mod: 25

## 2024-11-20 PROCEDURE — 90678 RSV VACC PREF BIVALENT IM: CPT | Mod: NC

## 2024-11-20 PROCEDURE — 76819 FETAL BIOPHYS PROFIL W/O NST: CPT | Mod: 59

## 2024-11-20 PROCEDURE — 76816 OB US FOLLOW-UP PER FETUS: CPT

## 2024-11-25 ENCOUNTER — NON-APPOINTMENT (OUTPATIENT)
Age: 34
End: 2024-11-25

## 2024-12-04 ENCOUNTER — OUTPATIENT (OUTPATIENT)
Dept: OUTPATIENT SERVICES | Facility: HOSPITAL | Age: 34
LOS: 1 days | End: 2024-12-04
Payer: MEDICAID

## 2024-12-04 ENCOUNTER — LABORATORY RESULT (OUTPATIENT)
Age: 34
End: 2024-12-04

## 2024-12-04 ENCOUNTER — NON-APPOINTMENT (OUTPATIENT)
Age: 34
End: 2024-12-04

## 2024-12-04 ENCOUNTER — APPOINTMENT (OUTPATIENT)
Dept: OBGYN | Facility: CLINIC | Age: 34
End: 2024-12-04
Payer: MEDICAID

## 2024-12-04 VITALS — SYSTOLIC BLOOD PRESSURE: 110 MMHG | BODY MASS INDEX: 39.14 KG/M2 | WEIGHT: 214 LBS | DIASTOLIC BLOOD PRESSURE: 82 MMHG

## 2024-12-04 DIAGNOSIS — R39.9 UNSPECIFIED SYMPTOMS AND SIGNS INVOLVING THE GENITOURINARY SYSTEM: ICD-10-CM

## 2024-12-04 PROCEDURE — 81003 URINALYSIS AUTO W/O SCOPE: CPT

## 2024-12-04 PROCEDURE — 87086 URINE CULTURE/COLONY COUNT: CPT

## 2024-12-04 PROCEDURE — G0463: CPT

## 2024-12-04 PROCEDURE — 87653 STREP B DNA AMP PROBE: CPT

## 2024-12-04 PROCEDURE — 87389 HIV-1 AG W/HIV-1&-2 AB AG IA: CPT

## 2024-12-04 PROCEDURE — 36415 COLL VENOUS BLD VENIPUNCTURE: CPT

## 2024-12-04 PROCEDURE — 90471 IMMUNIZATION ADMIN: CPT

## 2024-12-04 PROCEDURE — 87077 CULTURE AEROBIC IDENTIFY: CPT

## 2024-12-04 PROCEDURE — 99213 OFFICE O/P EST LOW 20 MIN: CPT | Mod: 25

## 2024-12-05 ENCOUNTER — LABORATORY RESULT (OUTPATIENT)
Age: 34
End: 2024-12-05

## 2024-12-05 DIAGNOSIS — Z34.80 ENCOUNTER FOR SUPERVISION OF OTHER NORMAL PREGNANCY, UNSPECIFIED TRIMESTER: ICD-10-CM

## 2024-12-05 LAB
GROUP B BETA STREP DNA (PCR): SIGNIFICANT CHANGE UP
HIV 1+2 AB+HIV1 P24 AG SERPL QL IA: SIGNIFICANT CHANGE UP
SOURCE GROUP B STREP: SIGNIFICANT CHANGE UP

## 2024-12-06 DIAGNOSIS — Z34.93 ENCOUNTER FOR SUPERVISION OF NORMAL PREGNANCY, UNSPECIFIED, THIRD TRIMESTER: ICD-10-CM

## 2024-12-06 LAB
CULTURE RESULTS: SIGNIFICANT CHANGE UP
SPECIMEN SOURCE: SIGNIFICANT CHANGE UP

## 2024-12-09 ENCOUNTER — NON-APPOINTMENT (OUTPATIENT)
Age: 34
End: 2024-12-09

## 2024-12-09 ENCOUNTER — OUTPATIENT (OUTPATIENT)
Dept: OUTPATIENT SERVICES | Facility: HOSPITAL | Age: 34
LOS: 1 days | End: 2024-12-09
Payer: MEDICAID

## 2024-12-09 ENCOUNTER — APPOINTMENT (OUTPATIENT)
Dept: OBGYN | Facility: CLINIC | Age: 34
End: 2024-12-09
Payer: MEDICAID

## 2024-12-09 VITALS — SYSTOLIC BLOOD PRESSURE: 108 MMHG | WEIGHT: 214 LBS | BODY MASS INDEX: 39.14 KG/M2 | DIASTOLIC BLOOD PRESSURE: 70 MMHG

## 2024-12-09 DIAGNOSIS — Z34.90 ENCOUNTER FOR SUPERVISION OF NORMAL PREGNANCY, UNSPECIFIED, UNSPECIFIED TRIMESTER: ICD-10-CM

## 2024-12-09 DIAGNOSIS — Z34.93 ENCOUNTER FOR SUPERVISION OF NORMAL PREGNANCY, UNSPECIFIED, THIRD TRIMESTER: ICD-10-CM

## 2024-12-09 DIAGNOSIS — Z90.49 ACQUIRED ABSENCE OF OTHER SPECIFIED PARTS OF DIGESTIVE TRACT: Chronic | ICD-10-CM

## 2024-12-09 DIAGNOSIS — Z34.80 ENCOUNTER FOR SUPERVISION OF OTHER NORMAL PREGNANCY, UNSPECIFIED TRIMESTER: ICD-10-CM

## 2024-12-09 PROCEDURE — 99213 OFFICE O/P EST LOW 20 MIN: CPT | Mod: 25

## 2024-12-09 PROCEDURE — G0463: CPT

## 2024-12-09 PROCEDURE — 81003 URINALYSIS AUTO W/O SCOPE: CPT

## 2024-12-10 ENCOUNTER — APPOINTMENT (OUTPATIENT)
Dept: OBGYN | Facility: CLINIC | Age: 34
End: 2024-12-10
Payer: MEDICAID

## 2024-12-12 ENCOUNTER — NON-APPOINTMENT (OUTPATIENT)
Age: 34
End: 2024-12-12

## 2024-12-17 ENCOUNTER — RESULT REVIEW (OUTPATIENT)
Age: 34
End: 2024-12-17

## 2024-12-17 ENCOUNTER — ASOB RESULT (OUTPATIENT)
Age: 34
End: 2024-12-17

## 2024-12-17 ENCOUNTER — APPOINTMENT (OUTPATIENT)
Dept: OBGYN | Facility: CLINIC | Age: 34
End: 2024-12-17
Payer: MEDICAID

## 2024-12-17 ENCOUNTER — OUTPATIENT (OUTPATIENT)
Dept: OUTPATIENT SERVICES | Facility: HOSPITAL | Age: 34
LOS: 1 days | End: 2024-12-17
Payer: MEDICAID

## 2024-12-17 ENCOUNTER — APPOINTMENT (OUTPATIENT)
Dept: ANTEPARTUM | Facility: CLINIC | Age: 34
End: 2024-12-17
Payer: MEDICAID

## 2024-12-17 ENCOUNTER — INPATIENT (INPATIENT)
Facility: HOSPITAL | Age: 34
LOS: 1 days | Discharge: HOME | DRG: 833 | End: 2024-12-19
Attending: OBSTETRICS & GYNECOLOGY | Admitting: OBSTETRICS & GYNECOLOGY
Payer: MEDICAID

## 2024-12-17 VITALS
OXYGEN SATURATION: 96 % | HEART RATE: 87 BPM | TEMPERATURE: 98 F | DIASTOLIC BLOOD PRESSURE: 68 MMHG | SYSTOLIC BLOOD PRESSURE: 100 MMHG

## 2024-12-17 VITALS — SYSTOLIC BLOOD PRESSURE: 112 MMHG | BODY MASS INDEX: 39.14 KG/M2 | WEIGHT: 214 LBS | DIASTOLIC BLOOD PRESSURE: 74 MMHG

## 2024-12-17 DIAGNOSIS — Z34.80 ENCOUNTER FOR SUPERVISION OF OTHER NORMAL PREGNANCY, UNSPECIFIED TRIMESTER: ICD-10-CM

## 2024-12-17 DIAGNOSIS — Z90.49 ACQUIRED ABSENCE OF OTHER SPECIFIED PARTS OF DIGESTIVE TRACT: Chronic | ICD-10-CM

## 2024-12-17 DIAGNOSIS — M54.9 DORSALGIA, UNSPECIFIED: ICD-10-CM

## 2024-12-17 DIAGNOSIS — O26.899 OTHER SPECIFIED PREGNANCY RELATED CONDITIONS, UNSPECIFIED TRIMESTER: ICD-10-CM

## 2024-12-17 DIAGNOSIS — Z34.90 ENCOUNTER FOR SUPERVISION OF NORMAL PREGNANCY, UNSPECIFIED, UNSPECIFIED TRIMESTER: ICD-10-CM

## 2024-12-17 DIAGNOSIS — Z34.93 ENCOUNTER FOR SUPERVISION OF NORMAL PREGNANCY, UNSPECIFIED, THIRD TRIMESTER: ICD-10-CM

## 2024-12-17 LAB
ALBUMIN SERPL ELPH-MCNC: 3.5 G/DL — SIGNIFICANT CHANGE UP (ref 3.3–5)
ALP SERPL-CCNC: 243 U/L — HIGH (ref 40–120)
ALT FLD-CCNC: 14 U/L — SIGNIFICANT CHANGE UP (ref 10–45)
ANION GAP SERPL CALC-SCNC: 18 MMOL/L — HIGH (ref 5–17)
APPEARANCE UR: CLEAR — SIGNIFICANT CHANGE UP
AST SERPL-CCNC: 19 U/L — SIGNIFICANT CHANGE UP (ref 10–40)
BACTERIA # UR AUTO: ABNORMAL /HPF
BILIRUB SERPL-MCNC: 0.3 MG/DL — SIGNIFICANT CHANGE UP (ref 0.2–1.2)
BILIRUB UR-MCNC: NEGATIVE — SIGNIFICANT CHANGE UP
BLD GP AB SCN SERPL QL: NEGATIVE — SIGNIFICANT CHANGE UP
BUN SERPL-MCNC: 6 MG/DL — LOW (ref 7–23)
CALCIUM SERPL-MCNC: 9.5 MG/DL — SIGNIFICANT CHANGE UP (ref 8.4–10.5)
CAST: 1 /LPF — SIGNIFICANT CHANGE UP (ref 0–4)
CHLORIDE SERPL-SCNC: 101 MMOL/L — SIGNIFICANT CHANGE UP (ref 96–108)
CO2 SERPL-SCNC: 16 MMOL/L — LOW (ref 22–31)
COLOR SPEC: YELLOW — SIGNIFICANT CHANGE UP
CREAT SERPL-MCNC: 0.42 MG/DL — LOW (ref 0.5–1.3)
DIFF PNL FLD: NEGATIVE — SIGNIFICANT CHANGE UP
EGFR: 133 ML/MIN/1.73M2 — SIGNIFICANT CHANGE UP
GLUCOSE SERPL-MCNC: 74 MG/DL — SIGNIFICANT CHANGE UP (ref 70–99)
GLUCOSE UR QL: NEGATIVE MG/DL — SIGNIFICANT CHANGE UP
HCT VFR BLD CALC: 33.4 % — LOW (ref 34.5–45)
HGB BLD-MCNC: 11.1 G/DL — LOW (ref 11.5–15.5)
KETONES UR-MCNC: NEGATIVE MG/DL — SIGNIFICANT CHANGE UP
LEUKOCYTE ESTERASE UR-ACNC: ABNORMAL
MCHC RBC-ENTMCNC: 25.8 PG — LOW (ref 27–34)
MCHC RBC-ENTMCNC: 33.2 G/DL — SIGNIFICANT CHANGE UP (ref 32–36)
MCV RBC AUTO: 77.5 FL — LOW (ref 80–100)
NITRITE UR-MCNC: NEGATIVE — SIGNIFICANT CHANGE UP
NRBC # BLD: 0 /100 WBCS — SIGNIFICANT CHANGE UP (ref 0–0)
PH UR: 6 — SIGNIFICANT CHANGE UP (ref 5–8)
PLATELET # BLD AUTO: 205 K/UL — SIGNIFICANT CHANGE UP (ref 150–400)
POTASSIUM SERPL-MCNC: 3.9 MMOL/L — SIGNIFICANT CHANGE UP (ref 3.5–5.3)
POTASSIUM SERPL-SCNC: 3.9 MMOL/L — SIGNIFICANT CHANGE UP (ref 3.5–5.3)
PROT SERPL-MCNC: 7.1 G/DL — SIGNIFICANT CHANGE UP (ref 6–8.3)
PROT UR-MCNC: NEGATIVE MG/DL — SIGNIFICANT CHANGE UP
RBC # BLD: 4.31 M/UL — SIGNIFICANT CHANGE UP (ref 3.8–5.2)
RBC # FLD: 14.1 % — SIGNIFICANT CHANGE UP (ref 10.3–14.5)
RBC CASTS # UR COMP ASSIST: 0 /HPF — SIGNIFICANT CHANGE UP (ref 0–4)
REVIEW: SIGNIFICANT CHANGE UP
RH IG SCN BLD-IMP: POSITIVE — SIGNIFICANT CHANGE UP
SODIUM SERPL-SCNC: 135 MMOL/L — SIGNIFICANT CHANGE UP (ref 135–145)
SP GR SPEC: 1 — SIGNIFICANT CHANGE UP (ref 1–1.03)
SQUAMOUS # UR AUTO: 5 /HPF — SIGNIFICANT CHANGE UP (ref 0–5)
UROBILINOGEN FLD QL: 0.2 MG/DL — SIGNIFICANT CHANGE UP (ref 0.2–1)
WBC # BLD: 9.21 K/UL — SIGNIFICANT CHANGE UP (ref 3.8–10.5)
WBC # FLD AUTO: 9.21 K/UL — SIGNIFICANT CHANGE UP (ref 3.8–10.5)
WBC UR QL: 4 /HPF — SIGNIFICANT CHANGE UP (ref 0–5)
YEAST-LIKE CELLS: PRESENT

## 2024-12-17 PROCEDURE — 76816 OB US FOLLOW-UP PER FETUS: CPT

## 2024-12-17 PROCEDURE — 81003 URINALYSIS AUTO W/O SCOPE: CPT

## 2024-12-17 PROCEDURE — 99213 OFFICE O/P EST LOW 20 MIN: CPT | Mod: 25

## 2024-12-17 PROCEDURE — G0463: CPT

## 2024-12-17 PROCEDURE — 76770 US EXAM ABDO BACK WALL COMP: CPT | Mod: 26

## 2024-12-17 RX ORDER — CEFTRIAXONE SODIUM 1 G
1000 VIAL (EA) INJECTION ONCE
Refills: 0 | Status: COMPLETED | OUTPATIENT
Start: 2024-12-17 | End: 2024-12-17

## 2024-12-17 RX ORDER — METOCLOPRAMIDE HYDROCHLORIDE 10 MG/1
10 TABLET ORAL ONCE
Refills: 0 | Status: COMPLETED | OUTPATIENT
Start: 2024-12-17 | End: 2024-12-17

## 2024-12-17 RX ORDER — ACETAMINOPHEN 500MG 500 MG/1
1000 TABLET, COATED ORAL ONCE
Refills: 0 | Status: COMPLETED | OUTPATIENT
Start: 2024-12-17 | End: 2024-12-17

## 2024-12-17 RX ORDER — 0.9 % SODIUM CHLORIDE 0.9 %
1000 INTRAVENOUS SOLUTION INTRAVENOUS
Refills: 0 | Status: DISCONTINUED | OUTPATIENT
Start: 2024-12-17 | End: 2024-12-18

## 2024-12-17 RX ORDER — DIPHENHYDRAMINE HCL 25 MG
25 CAPSULE ORAL ONCE
Refills: 0 | Status: COMPLETED | OUTPATIENT
Start: 2024-12-17 | End: 2024-12-17

## 2024-12-17 RX ADMIN — Medication 125 MILLILITER(S): at 13:48

## 2024-12-17 RX ADMIN — METOCLOPRAMIDE HYDROCHLORIDE 10 MILLIGRAM(S): 10 TABLET ORAL at 18:29

## 2024-12-17 RX ADMIN — ACETAMINOPHEN 500MG 400 MILLIGRAM(S): 500 TABLET, COATED ORAL at 16:33

## 2024-12-17 RX ADMIN — Medication 100 MILLIGRAM(S): at 13:48

## 2024-12-17 RX ADMIN — Medication 25 MILLIGRAM(S): at 18:29

## 2024-12-17 NOTE — OB PROVIDER TRIAGE NOTE - NSHPPHYSICALEXAM_GEN_ALL_CORE
Vital Signs Last 24 Hrs  T(C): 36.5 (17 Dec 2024 10:30), Max: 36.5 (17 Dec 2024 10:25)  T(F): 97.7 (17 Dec 2024 10:30), Max: 97.7 (17 Dec 2024 10:25)  HR: 87 (17 Dec 2024 11:10) (75 - 96)  BP: 100/68 (17 Dec 2024 10:30) (100/68 - 100/68)  BP(mean): --  RR: 18 (17 Dec 2024 10:30) (18 - 18)  SpO2: 95% (17 Dec 2024 11:10) (93% - 98%)    Parameters below as of 17 Dec 2024 10:30  Patient On (Oxygen Delivery Method): room air        FHT: Baseline: 140 , moderate variability, + accels, - decels  [place]: q none  Sono: Vertex

## 2024-12-17 NOTE — OB PROVIDER TRIAGE NOTE - NSOBPROVIDERNOTE_OBGYN_ALL_OB_FT
A/P: Pt is a 34y  who presents with L flank pain and fevers    1. UA/UCx, CBC, CMP, T&S  2. LR@125  3. Fetus: Vertex, Reactive/CEFM  4. GBS neg     Discussed with Dr. Arnaud Chaney MD, PGY-2  Obstetrics and Gynecology A/P: Pt is a 34y  who presents with L flank pain and fevers    1. UA/UCx, CBC, CMP, T&S  2. LR@125  3. Fetus: Vertex, Reactive/CEFM  4. GBS neg     Discussed with Dr. Arnaud Chaney MD, PGY-2  Obstetrics and Gynecology    ADDENDUM  UA revealing no infection, CBC without leukocytosis, renal sonogram revealing no acute pathology. Pt with persistent L flank pain and occasional cramping.   VE: 0/0/-3  IV Tylenol ordered. Symptoms to be reassessed    D/w Dr. Arnaud Chaney PGY2

## 2024-12-17 NOTE — OB RN TRIAGE NOTE - NSICDXPASTMEDICALHX_GEN_ALL_CORE_FT
PAST MEDICAL HISTORY:  Migraine     PID (pelvic inflammatory disease)     Urinary tract infection

## 2024-12-17 NOTE — OB RN TRIAGE NOTE - NS_MEANSOFARRIVAL_OBGYN_ALL_OB
[Left] : left elbow [NL (150)] : flexion 150 degrees [NL (0)] : extension 0 degrees [Pain with Pronation] : pain with pronation Ambulatory [Pain with Supination] : pain with supination [] : no sero-sanguinous drainage

## 2024-12-17 NOTE — OB RN PATIENT PROFILE - FALL HARM RISK - UNIVERSAL INTERVENTIONS
Attending Bed in lowest position, wheels locked, appropriate side rails in place/Call bell, personal items and telephone in reach/Instruct patient to call for assistance before getting out of bed or chair/Non-slip footwear when patient is out of bed/[place] to call system/Physically safe environment - no spills, clutter or unnecessary equipment/Purposeful Proactive Rounding/Room/bathroom lighting operational, light cord in reach

## 2024-12-17 NOTE — OB PROVIDER TRIAGE NOTE - ATTENDING COMMENTS
34y  # 37+ 6 seen for flank  pain and fever  presumed pyelo w prior history in prior pregs, note foul urine    nml WBL  UA equivical  Ucx pending  renal sono pending, r/o stone    empirical abx (ceftriaxone)      + FM, -LOF, -VB  no ctx    vitals wnl as above        A MD Arnaud  attending

## 2024-12-17 NOTE — OB PROVIDER TRIAGE NOTE - HISTORY OF PRESENT ILLNESS
HPI: Pt is a 34y   @37w6d presenting for r/o pyelonephritis. Pt reporting L flank pain that began last Wednesday. She informed the clinic who advised her to present to triage. She decided to stay at home and states that pain persisted at 10/10. For the past four days, she notes dysuria, increased urgency/frequency, and foul smelling urine. On Saturday and , she had fevers to 100.6* despite taking Tylenol for pain. She reports Hx pyelonephritis in prior pregnancies, and this pain feels like her prior pyelonephritis.  FM (+)  LOF (-)  CTX (-)  VB (-)  GBS neg  EFW: 3135g    POB:  NSVDx4 (, , , ) had unspecified hypertensive disorder in two pregnancies. 2 prior pregnancies c/b pyelonephritis  Pgyn: Denies fibroids, cysts, endometriosis, STI's, Abnormal pap smears   PMHX: Hx of migraines and pyelonephritis is 2 prior pregnancies.   PSHx: Lap Cholecystectomy   Meds: ASA, PNV, Tylenol  All: NKDA  Social History:  Denies smoking use, drug use, alcohol use.

## 2024-12-18 LAB
BASOPHILS # BLD AUTO: 0.02 K/UL — SIGNIFICANT CHANGE UP (ref 0–0.2)
BASOPHILS NFR BLD AUTO: 0.3 % — SIGNIFICANT CHANGE UP (ref 0–2)
CULTURE RESULTS: SIGNIFICANT CHANGE UP
EOSINOPHIL # BLD AUTO: 0.04 K/UL — SIGNIFICANT CHANGE UP (ref 0–0.5)
EOSINOPHIL NFR BLD AUTO: 0.5 % — SIGNIFICANT CHANGE UP (ref 0–6)
FLUAV AG NPH QL: SIGNIFICANT CHANGE UP
FLUBV AG NPH QL: SIGNIFICANT CHANGE UP
HCT VFR BLD CALC: 32.1 % — LOW (ref 34.5–45)
HGB BLD-MCNC: 10.6 G/DL — LOW (ref 11.5–15.5)
IMM GRANULOCYTES NFR BLD AUTO: 1.1 % — HIGH (ref 0–0.9)
LYMPHOCYTES # BLD AUTO: 1.76 K/UL — SIGNIFICANT CHANGE UP (ref 1–3.3)
LYMPHOCYTES # BLD AUTO: 23.2 % — SIGNIFICANT CHANGE UP (ref 13–44)
MCHC RBC-ENTMCNC: 25.8 PG — LOW (ref 27–34)
MCHC RBC-ENTMCNC: 33 G/DL — SIGNIFICANT CHANGE UP (ref 32–36)
MCV RBC AUTO: 78.1 FL — LOW (ref 80–100)
MONOCYTES # BLD AUTO: 0.52 K/UL — SIGNIFICANT CHANGE UP (ref 0–0.9)
MONOCYTES NFR BLD AUTO: 6.9 % — SIGNIFICANT CHANGE UP (ref 2–14)
NEUTROPHILS # BLD AUTO: 5.16 K/UL — SIGNIFICANT CHANGE UP (ref 1.8–7.4)
NEUTROPHILS NFR BLD AUTO: 68 % — SIGNIFICANT CHANGE UP (ref 43–77)
NRBC # BLD: 0 /100 WBCS — SIGNIFICANT CHANGE UP (ref 0–0)
PLATELET # BLD AUTO: 196 K/UL — SIGNIFICANT CHANGE UP (ref 150–400)
RBC # BLD: 4.11 M/UL — SIGNIFICANT CHANGE UP (ref 3.8–5.2)
RBC # FLD: 14.2 % — SIGNIFICANT CHANGE UP (ref 10.3–14.5)
RSV RNA NPH QL NAA+NON-PROBE: SIGNIFICANT CHANGE UP
SARS-COV-2 RNA SPEC QL NAA+PROBE: SIGNIFICANT CHANGE UP
SPECIMEN SOURCE: SIGNIFICANT CHANGE UP
WBC # BLD: 7.58 K/UL — SIGNIFICANT CHANGE UP (ref 3.8–10.5)
WBC # FLD AUTO: 7.58 K/UL — SIGNIFICANT CHANGE UP (ref 3.8–10.5)

## 2024-12-18 PROCEDURE — 99222 1ST HOSP IP/OBS MODERATE 55: CPT | Mod: GC

## 2024-12-18 PROCEDURE — 99232 SBSQ HOSP IP/OBS MODERATE 35: CPT

## 2024-12-18 RX ORDER — CEFTRIAXONE SODIUM 1 G
1000 VIAL (EA) INJECTION EVERY 24 HOURS
Refills: 0 | Status: DISCONTINUED | OUTPATIENT
Start: 2024-12-18 | End: 2024-12-19

## 2024-12-18 RX ORDER — ACETAMINOPHEN 500MG 500 MG/1
975 TABLET, COATED ORAL EVERY 6 HOURS
Refills: 0 | Status: DISCONTINUED | OUTPATIENT
Start: 2024-12-18 | End: 2024-12-19

## 2024-12-18 RX ORDER — HEPARIN SODIUM,PORCINE 1000/ML
5000 VIAL (ML) INJECTION EVERY 12 HOURS
Refills: 0 | Status: DISCONTINUED | OUTPATIENT
Start: 2024-12-18 | End: 2024-12-19

## 2024-12-18 RX ORDER — .BETA.-CAROTENE, SODIUM ACETATE, ASCORBIC ACID, CHOLECALCIFEROL, .ALPHA.-TOCOPHEROL ACETATE, DL-, THIAMINE MONONITRATE, RIBOFLAVIN, NIACINAMIDE, PYRIDOXINE HYDROCHLORIDE, FOLIC ACID, CYANOCOBALAMIN, CALCIUM CARBONATE, FERROUS FUMARATE, ZINC OXIDE AND CUPRIC OXIDE 2000; 2000; 120; 400; 22; 1.84; 3; 20; 10; 1; 12; 200; 27; 25; 2 [IU]/1; [IU]/1; MG/1; [IU]/1; MG/1; MG/1; MG/1; MG/1; MG/1; MG/1; UG/1; MG/1; MG/1; MG/1; MG/1
1 TABLET ORAL DAILY
Refills: 0 | Status: DISCONTINUED | OUTPATIENT
Start: 2024-12-18 | End: 2024-12-19

## 2024-12-18 RX ADMIN — ACETAMINOPHEN 500MG 975 MILLIGRAM(S): 500 TABLET, COATED ORAL at 01:15

## 2024-12-18 RX ADMIN — Medication 100 MILLIGRAM(S): at 13:17

## 2024-12-18 RX ADMIN — Medication 81 MILLIGRAM(S): at 12:29

## 2024-12-18 RX ADMIN — .BETA.-CAROTENE, SODIUM ACETATE, ASCORBIC ACID, CHOLECALCIFEROL, .ALPHA.-TOCOPHEROL ACETATE, DL-, THIAMINE MONONITRATE, RIBOFLAVIN, NIACINAMIDE, PYRIDOXINE HYDROCHLORIDE, FOLIC ACID, CYANOCOBALAMIN, CALCIUM CARBONATE, FERROUS FUMARATE, ZINC OXIDE AND CUPRIC OXIDE 1 TABLET(S): 2000; 2000; 120; 400; 22; 1.84; 3; 20; 10; 1; 12; 200; 27; 25; 2 TABLET ORAL at 12:29

## 2024-12-18 RX ADMIN — Medication 5000 UNIT(S): at 05:41

## 2024-12-18 RX ADMIN — Medication 5000 UNIT(S): at 17:27

## 2024-12-18 NOTE — PROGRESS NOTE ADULT - ASSESSMENT
A/P: Pt is a 34y  at 38w0d who presents with L flank pain and fevers at home with +CVA tenderness on exam. Pt has history of pyelonephritis in prior pregnancies and reports it felt like this at that time. Out of concern for pyelonephritis and given the elevated risk of associated ARDS in pregnancy, pt admitted to antepartum for further monitoring. Overnight, maternal and fetal status overall reassuring.    #Pyelonephritis  - UA (): +trace leukocyte esterase, few bacteria  - Renal US (): No hydronephrosis, no renal calculi identified.  - continue ceftriaxone (-)  - f/u urine culture ()  - Tylenol PRN    #Fetal wellbeing  - GBS neg ()  - NST QD  - PNV    #Maternal wellbeing  - regular diet  - IV hydration  - ASA  - AM CBC  - HSQ for DVT prophylaxis    Byron Zuniga, PGY-3 A/P: Pt is a 34y  at 38w0d who presents with L flank pain and fevers at home with +CVA tenderness on exam. Pt has history of pyelonephritis in prior pregnancies and reports it felt like this at that time. Out of concern for pyelonephritis and given the elevated risk of associated ARDS in pregnancy, pt admitted to antepartum for further monitoring. Overnight, maternal and fetal status overall reassuring.    #Pyelonephritis  - UA (): +trace leukocyte esterase, few bacteria  - Renal US (): No hydronephrosis, no renal calculi identified.  - continue ceftriaxone (-)  - f/u urine culture ()  - Tylenol PRN    #Fetal wellbeing  - GBS neg ()  - NST QD  - ATU (): vtx, ant, TWILA 10.86, MVP 4.46, BPP 8/8, 3135g, 6lb 15oz (43%, AC 78%)    #Maternal wellbeing  - regular diet  - IV hydration  - ASA, PNV  - AM CBC  - HSQ for DVT prophylaxis    Byron Zuniga, PGY-3

## 2024-12-18 NOTE — OB PROVIDER H&P - HISTORY OF PRESENT ILLNESS
HPI: Pt is a 34y   @38w0d presenting for r/o pyelonephritis. Pt reporting L flank pain that began last Wednesday. She informed the clinic who advised her to present to triage. She decided to stay at home and states that pain persisted at 10/10. For the past four days, she notes dysuria, increased urgency/frequency, and foul smelling urine. On Saturday and , she had fevers to 100.6* despite taking Tylenol for pain. She reports Hx pyelonephritis in prior pregnancies, and this pain feels like her prior pyelonephritis.  FM (+)  LOF (-)  CTX (-)  VB (-)  GBS neg  EFW: 3135g    POB:  NSVDx4 (, , , ) had unspecified hypertensive disorder in two pregnancies. 2 prior pregnancies c/b pyelonephritis  Pgyn: Denies fibroids, cysts, endometriosis, STI's, Abnormal pap smears   PMHX: Hx of migraines and pyelonephritis is 2 prior pregnancies. sickle cell trait.  PSHx: Lap Cholecystectomy   Meds: ASA, PNV, Tylenol  All: NKDA  Social History:  Denies smoking use, drug use, alcohol use.

## 2024-12-18 NOTE — OB PROVIDER H&P - ASSESSMENT
A/P: Pt is a 34y  who presents with L flank pain and fevers at home with +CVA tenderness on exam. Pt has history of pyelonephritis in prior pregnancies and reports it felt like this at that time. Out of concern for pyelonephritis and given the elevated risk of associated ARDS in pregnancy, pt admitted to antepartum for further monitoring.    #Pyelonephritis  - UA: +trace luekocyte esterase, few bacteria  - Renal US: No hydronephrosis, no renal calculi identified.  - continue ceftriaxone  - f/u urine culture    #Fetal wellbeing  - GBS neg  - NST QD  - PNV    #Maternal wellbeing  - regular diet  - IV hydration  - ASA  - AM CBC  - Tylenol PRN for fever/pain    D/w MFM fellow Dr. Oseguera  D/w Dr. Gonzalez OB   Munira Mccartney PGY3 A/P: Pt is a 34y  who presents with L flank pain and fevers at home with +CVA tenderness on exam. Pt has history of pyelonephritis in prior pregnancies and reports it felt like this at that time. Out of concern for pyelonephritis and given the elevated risk of associated ARDS in pregnancy, pt admitted to antepartum for further monitoring.    #Pyelonephritis  - UA: +trace leukocyte esterase, few bacteria  - Renal US: No hydronephrosis, no renal calculi identified.  - continue ceftriaxone  - f/u urine culture    #Fetal wellbeing  - GBS neg  - NST QD  - PNV    #Maternal wellbeing  - regular diet  - IV hydration  - ASA  - AM CBC  - Tylenol PRN for fever/pain  - HSQ for DVT prophylaxis    D/w MFM fellow Dr. Oseguera  D/w Dr. Gonzalez OB   Munira Mccartney PGY3

## 2024-12-18 NOTE — OB PROVIDER H&P - LABOR: CERVICAL CONSISTENCY
Spoke with patient in regards to rescheduling surgery with Dr. Alva We discussed date, time and location for the procedure.     Patient is looking to schedule into February 2022, writer will call back with a confirmed date & time and discuss procedure instructions.     Patient agreed to plan and had no further questions, agreed to call should anything arise.    firm

## 2024-12-18 NOTE — OB PROVIDER H&P - NSHPPHYSICALEXAM_GEN_ALL_CORE
Vital Signs Last 24 Hrs  T(C): 36.7 (17 Dec 2024 23:53), Max: 36.9 (17 Dec 2024 13:17)  T(F): 98.1 (17 Dec 2024 23:53), Max: 98.42 (17 Dec 2024 13:17)  HR: 74 (18 Dec 2024 00:30) (63 - 100)  BP: 99/61 (17 Dec 2024 23:53) (95/48 - 111/52)  RR: 16 (17 Dec 2024 23:53) (14 - 18)  SpO2: 96% (18 Dec 2024 00:30) (91% - 99%)    O2 Parameters below as of 17 Dec 2024 23:53  Patient On (Oxygen Delivery Method): room air      FHT: Baseline: 140 , moderate variability, + accels, - decels  [place]: q none  Sono: Vertex  VE: 0/0/-3

## 2024-12-18 NOTE — OB PROVIDER H&P - ATTENDING COMMENTS
/Attendiny/o  @38wks admitted w/ suspected Pyelo.  Pt was discussed with me; chart reviewed; pt seen at bedside and I agree with the above Resident's assessment and plan.    Pt presented to L&D w/ c/o fever, burning on urination, frequency, urgency, foul smelling urine and right flank pain and said that it is similar to when she had pyelonephritis in her previous pregnancy.  Pt was afebrile on presentation, but had +CVA tenderness and was given IV Ceftriaxone after U/A and culture sent.    Pt w/ PNC in [facility] and has been fairly uncomplicated.  Hx of SC trait and Migraines.  Hx of  x4 (, , , ) and complicated by pyelonephritis in 2 pregnancies and hypertensive disorder in 2 pregnancies; pelvis tested to 8lbs 7oz.    Prenatal labs reviewed:   Blood Type:  O+; GBS: neg; HepB/C:  HIV: neg; RPR: neg    VSS, afebrile  EFW: 3135gms  FHT: BL: 140bpm; Reactive NST  [place]ende: not jose  SVE: 0/0/-3  Memb: Intact    Labs:  WBC: 9.21; H/H: 11.1/33.4; Plt: 205; Cr.: 0.42; AST/ALt: 19/14  U/A: +Yeast; Low Bacteria; Trace Leuks    Renal Sono: wnl, no hydronephrosis    A/P  -35y/o  @38wks admitted w/ suspected Pyelo.  -Consent for admission obtained after R/B/A reviewed and all questions answered and pt voiced understanding.  -Pt admitted to antepartum and will continue IV Ceftriaxone and will f/u UCx; CBC in the AM.  -Routine antepartum care while in-house w/ NST 2x daily.  -SQ Heparin for DVT prophylaxis  -M to manage pt while in-house    Dr. Sheriff

## 2024-12-18 NOTE — OB PROVIDER H&P - NSHPLABSRESULTS_GEN_ALL_CORE
11.1   9.21  )-----------( 205      ( 17 Dec 2024 12:25 )             33.4     12-17-24 @ 12:25    135  |  101  |  6[L]             --------------------------< 74     3.9  |  16[L]  | 0.42[L]      Calcium: 9.5    AST: 19    ALT: 14  AlkPhos: 243[H]  Protein: 7.1  Albumin: 3.5  TBili: 0.3    Urinalysis Basic - ( 17 Dec 2024 12:25 )    Color: x / Appearance: x / SG: x / pH: x  Gluc: 74 mg/dL / Ketone: x  / Bili: x / Urobili: x   Blood: x / Protein: x / Nitrite: x   Leuk Esterase: x / RBC: x / WBC x   Sq Epi: x / Non Sq Epi: x / Bacteria: x    Radiology:  < from: US Kidney and Bladder (12.17.24 @ 15:05) >    ACC: [id number] EXAM:  US KIDNEYS AND BLADDER   ORDERED BY:  DASHAWN SU     PROCEDURE DATE:  12/17/2024          INTERPRETATION:  CLINICAL INFORMATION: Left flank pain, dysuria,   increased urinary urgency/frequency, foul-smelling urine. Reported fever.   Pregnant. Evaluate for kidney stone or pyelonephritis.    COMPARISON: Abdominal ultrasound 4/10/2020.    TECHNIQUE: Sonography of the kidneys and bladder.    FINDINGS:  Right kidney: 14.7 cm. No hydronephrosis. No renal mass or calculus   visualized.    Left kidney: 13.6 cm. No hydronephrosis. Lower pole renal sinus cyst   measuring 1.1 x 1.0 x 1.3 cm. No renal calculus visualized.    Urinary bladder: Within normal limits.    IMPRESSION:    *  No hydronephrosis.  *  No renal calculi identified.  *  Limited sensitivity for acute pyelonephritis by ultrasound, which is   not excluded by this exam. No obvious ultrasound findings of acute   pyelonephritis.    --- End of Report ---    BEATRIZ SANFORD MD; Resident Radiologist  This document has been electronically signed.  POONAM HEADLEY MD; Attending Radiologist  This document has been electronically signed. Dec 17 2024  3:48PM    < end of copied text >

## 2024-12-18 NOTE — OB PROVIDER H&P - NSLOWPPHRISK_OBGYN_A_OB
No previous uterine incision/[place] Pregnancy/Less than or equal to 4 previous vaginal births/No known bleeding disorder/No history of postpartum hemorrhage No previous uterine incision/[place] Pregnancy/Less than or equal to 4 previous vaginal births/No known bleeding disorder

## 2024-12-19 ENCOUNTER — TRANSCRIPTION ENCOUNTER (OUTPATIENT)
Age: 34
End: 2024-12-19

## 2024-12-19 VITALS
OXYGEN SATURATION: 97 % | DIASTOLIC BLOOD PRESSURE: 72 MMHG | RESPIRATION RATE: 18 BRPM | HEART RATE: 84 BPM | SYSTOLIC BLOOD PRESSURE: 111 MMHG | TEMPERATURE: 99 F

## 2024-12-19 PROCEDURE — 99238 HOSP IP/OBS DSCHRG MGMT 30/<: CPT

## 2024-12-19 PROCEDURE — 81001 URINALYSIS AUTO W/SCOPE: CPT

## 2024-12-19 PROCEDURE — 87086 URINE CULTURE/COLONY COUNT: CPT

## 2024-12-19 PROCEDURE — 86901 BLOOD TYPING SEROLOGIC RH(D): CPT

## 2024-12-19 PROCEDURE — 85025 COMPLETE CBC W/AUTO DIFF WBC: CPT

## 2024-12-19 PROCEDURE — 86900 BLOOD TYPING SEROLOGIC ABO: CPT

## 2024-12-19 PROCEDURE — 85027 COMPLETE CBC AUTOMATED: CPT

## 2024-12-19 PROCEDURE — 80053 COMPREHEN METABOLIC PANEL: CPT

## 2024-12-19 PROCEDURE — 76770 US EXAM ABDO BACK WALL COMP: CPT

## 2024-12-19 PROCEDURE — 86850 RBC ANTIBODY SCREEN: CPT

## 2024-12-19 PROCEDURE — 87637 SARSCOV2&INF A&B&RSV AMP PRB: CPT

## 2024-12-19 RX ORDER — .BETA.-CAROTENE, SODIUM ACETATE, ASCORBIC ACID, CHOLECALCIFEROL, .ALPHA.-TOCOPHEROL ACETATE, DL-, THIAMINE MONONITRATE, RIBOFLAVIN, NIACINAMIDE, PYRIDOXINE HYDROCHLORIDE, FOLIC ACID, CYANOCOBALAMIN, CALCIUM CARBONATE, FERROUS FUMARATE, ZINC OXIDE AND CUPRIC OXIDE 2000; 2000; 120; 400; 22; 1.84; 3; 20; 10; 1; 12; 200; 27; 25; 2 [IU]/1; [IU]/1; MG/1; [IU]/1; MG/1; MG/1; MG/1; MG/1; MG/1; MG/1; UG/1; MG/1; MG/1; MG/1; MG/1
1 TABLET ORAL
Qty: 0 | Refills: 0 | DISCHARGE
Start: 2024-12-19

## 2024-12-19 RX ORDER — ACETAMINOPHEN 500MG 500 MG/1
3 TABLET, COATED ORAL
Qty: 0 | Refills: 0 | DISCHARGE
Start: 2024-12-19

## 2024-12-19 NOTE — DISCHARGE NOTE ANTEPARTUM - FINANCIAL ASSISTANCE
[facility] provides services at a reduced cost to those who are determined to be eligible through [facility]’s financial assistance program. Information regarding [facility]’s financial assistance program can be found by going to [email or web address] or by calling [phone].

## 2024-12-19 NOTE — PROGRESS NOTE ADULT - SUBJECTIVE AND OBJECTIVE BOX
PGY3 Antepartum Note    Patient seen and examined at bedside in NAD. Reports her flank/back pain is improved this AM. Denies any CTX, LOF or VB.  Reports good fetal movement. Denies fevers, chills, n/v, CP, SOB, palpitations.    T(F): 98.2 (05:40)  HR: 78 (05:40)  BP: 108/70 (05:40)  RR: 18 (05:40)    Gen: NAD  Cardio: rrr, s1/s2  Pulm: ca b/l  Abd: gravid, nontender, no palpable contractions  Back: minimal b/l CVA tenderness, improved from yesterday  Ext: no edema,  no calf tenderness  SVE: deferred    Medications:  acetaminophen   IVPB ..: 400 mL/Hr (12-17-24 @ 16:33)  aspirin  chewable: 81 milliGRAM(s) (12-18-24 @ 12:29)  cefTRIAXone   IVPB: 100 mL/Hr (12-18-24 @ 13:17)  cefTRIAXone   IVPB: 100 mL/Hr (12-17-24 @ 13:48)  diphenhydrAMINE Injectable: 25 milliGRAM(s) (12-17-24 @ 18:29)  heparin   Injectable: 5000 Unit(s) (12-18-24 @ 17:27),  5000 Unit(s) (12-18-24 @ 05:41)  lactated ringers.: 125 mL/Hr (12-17-24 @ 11:12)  metoclopramide Injectable: 10 milliGRAM(s) (12-17-24 @ 18:29)  prenatal multivitamin: 1 Tablet(s) (12-18-24 @ 12:29)      Labs:                        10.6   7.58  )-----------( 196      ( 18 Dec 2024 07:23 )             32.1     12-17    135  |  101  |  6[L]  ----------------------------<  74  3.9   |  16[L]  |  0.42[L]    Ca    9.5      17 Dec 2024 12:25    TPro  7.1  /  Alb  3.5  /  TBili  0.3  /  DBili  x   /  AST  19  /  ALT  14  /  AlkPhos  243[H]  12-17      Urinalysis Basic - ( 17 Dec 2024 12:25 )    Color: x / Appearance: x / SG: x / pH: x  Gluc: 74 mg/dL / Ketone: x  / Bili: x / Urobili: x   Blood: x / Protein: x / Nitrite: x   Leuk Esterase: x / RBC: x / WBC x   Sq Epi: x / Non Sq Epi: x / Bacteria: x
PGY3 Antepartum Note    Patient seen and examined at bedside in NAD. Denies any CTX, LOF or VB.  Reports good fetal movement. Denies fevers, chills, nausea, vomiting, CP, SOB, palpitations.    T(F): 98.6 (05:57)  HR: 66 (05:57)  BP: 97/62 (05:57)  RR: 18 (05:57)    Gen: NAD  Cardio: rrr, s1/s2  Pulm: ca b/l  Abd: gravid, nontender, no palpable contractions  Back: mild L CVA tenderness  Ext: no edema,  no calf tenderness  SVE: deferred    Medications:  acetaminophen   IVPB ..: 400 mL/Hr (12-17-24 @ 16:33)  cefTRIAXone   IVPB: 100 mL/Hr (12-17-24 @ 13:48)  diphenhydrAMINE Injectable: 25 milliGRAM(s) (12-17-24 @ 18:29)  heparin   Injectable: 5000 Unit(s) (12-18-24 @ 05:41)  lactated ringers.: 125 mL/Hr (12-17-24 @ 11:12)  metoclopramide Injectable: 10 milliGRAM(s) (12-17-24 @ 18:29)      Labs:                        11.1   9.21  )-----------( 205      ( 17 Dec 2024 12:25 )             33.4     12-17    135  |  101  |  6[L]  ----------------------------<  74  3.9   |  16[L]  |  0.42[L]    Ca    9.5      17 Dec 2024 12:25    TPro  7.1  /  Alb  3.5  /  TBili  0.3  /  DBili  x   /  AST  19  /  ALT  14  /  AlkPhos  243[H]  12-17      Urinalysis Basic - ( 17 Dec 2024 12:25 )    Color: x / Appearance: x / SG: x / pH: x  Gluc: 74 mg/dL / Ketone: x  / Bili: x / Urobili: x   Blood: x / Protein: x / Nitrite: x   Leuk Esterase: x / RBC: x / WBC x   Sq Epi: x / Non Sq Epi: x / Bacteria: x

## 2024-12-19 NOTE — DISCHARGE NOTE ANTEPARTUM - MEDICATION SUMMARY - MEDICATIONS TO TAKE
I will START or STAY ON the medications listed below when I get home from the hospital:    acetaminophen 325 mg oral tablet  -- 3 tab(s) by mouth every 6 hours As needed Temp greater or equal to 38C (100.4F), Mild Pain (1 - 3), Moderate Pain (4 - 6)  -- Indication: For Pain    famotidine 20 mg oral tablet  -- 1 tab(s) by mouth once a day   -- It is very important that you take or use this exactly as directed.  Do not skip doses or discontinue unless directed by your doctor.  Obtain medical advice before taking any non-prescription drugs as some may affect the action of this medication.    -- Indication: For Reflux    Prenatal Multivitamins with Folic Acid 1 mg oral tablet  -- 1 tab(s) by mouth once a day  -- Indication: For Prenatal vitamin

## 2024-12-19 NOTE — PROGRESS NOTE ADULT - NSPROGADDITIONALINFOA_GEN_ALL_CORE
OLE FELLOW ADDENDUM:      34y  at 38w1d admitted for observation in the setting of subjective fevers at home, body aches vs back pain, N/V.  Pt today reports emesis not unchanged from the remainder of her pregnancy, feels well currently.  Denies back pain, abdomina; pain/cramping.  VS wnl, exam without abd tenderness or CVA tenderness.   She was initiated on ceftriaxone for possible pyelonephritis, however no leukocytosis, CVA tenderness, and urine culture negative - plan to discontinue antibiotics as likely viral infection.   Fetal status is reassuring on NST.  She has follow up on Tuesday with [facility], and plan for induction of labor next Thursday.  Reviewed precautions to return, including fevers, increased pain, labor/ROM/DFM/VB. She was consented /signed paperwork for tubal; discussed to consider options for bridge therapy in chance that postpartum tubal unable to be done during delivery admission. OK for discharge with close follow up.     Seen with [facility] [place] attending  Ambika Spaulding MD PGY-6

## 2024-12-19 NOTE — DISCHARGE NOTE ANTEPARTUM - PROVIDER TOKENS
FREE:[LAST:[[facility]],PHONE:[phone]],FAX:[(   )    -],ADDRESS:[[address] [place] [address]],SCHEDULEDAPPT:[12/24/2024]]

## 2024-12-19 NOTE — DISCHARGE NOTE ANTEPARTUM - PLAN OF CARE
You received ceftriaxone in the hospital for possible pyelonephritis. Urine culture was negative for infection. You can take Tylenol 975mg every 6 hours as needed for pain. Call your doctor if you have decreased fetal movement, painful contractions, leakage of fluid, or vaginal bleeding. Call your doctor if your pain worsens or if you have fever > 100.4F.

## 2024-12-19 NOTE — DISCHARGE NOTE ANTEPARTUM - MEDICATION SUMMARY - MEDICATIONS TO STOP TAKING
I will STOP taking the medications listed below when I get home from the hospital:    ondansetron 4 mg oral tablet, disintegrating  -- 1 tab(s) by mouth every 8 hours, As Needed -for nausea MDD:12 mg    ibuprofen 600 mg oral tablet  -- 1 tab(s) by mouth every 6 hours    Macrobid 100 mg oral capsule  -- 1 cap(s) by mouth 2 times a day

## 2024-12-19 NOTE — PROGRESS NOTE ADULT - ASSESSMENT
A/P: Pt is a 34y  at 38w1d who presents with L flank pain and fevers at home with +CVA tenderness on exam. Pt has history of pyelonephritis in prior pregnancies and reports it felt like this at that time. Out of concern for pyelonephritis and given the elevated risk of associated ARDS in pregnancy, pt admitted to antepartum for further monitoring. Overnight, maternal and fetal status overall reassuring.    #Pyelonephritis  - UA (): +trace leukocyte esterase, few bacteria  - UCx (): negative  - Renal US (): No hydronephrosis, no renal calculi identified  - continue ceftriaxone (-)  - Tylenol PRN    #Fetal wellbeing  - GBS neg ()  - NST QD  - ATU (): vtx, ant, TWILA 10.86, MVP 4.46, BPP 8/8, 3135g, 6lb 15oz (43%, AC 78%)    #Maternal wellbeing  - regular diet  - ASA, PNV  - f/u AM CBC  - RVP (): negative  - HSQ for DVT prophylaxis    #Dispo  - Discharge planning    Byron Zuniga, PGY-3

## 2024-12-19 NOTE — PROGRESS NOTE ADULT - ATTENDING COMMENTS
MFM Attending    Pt evaluated and counseled on MFM rounds.  In summary, Ms. Kaplan is a 33yo P4 @ 38 weeks HD 2 who presented with history of fever at home, dysuria and left flank tenderness.  She has a history of pyelonephritis in a prior pregnancy.      Though she was afebrile on presentation with normal WBC count and fairly unremarkable urinalysis (trace LE, few bacteria) the L&D team recommended admission for treatment of possible pyelonephritis and started her on ceftriaxone.  Renal ultrasound negative for stone/abscess.    Today she reports that her back pain is improving.  However she also endorses congestion, cough, nausea with one episode of vomiting today and diarrhea.  No shortness of breath.  Physical exam notable for mild bilateral CVAT, L>R.  WBC count today normal in the 7 range.  RVP sent and was negative for influenza/sars cov-19.      Our plan is to continue the ceftriaxone and await the urine culture result.  Will continue to monitor the above symptoms.  Fetal monitoring with daily NSTs.  She is planned for an induction of labor at 39 weeks.
MFM Attending     Pt evaluated and counseled on MFM rounds today.  In summary, Ms. Pranay Jesus is a 33yo  @ 38 1/7 weeks HD 2 s/p admission for dysuria and flank pain initially concerning for pyelonephritis.  The patient was afebrile on presentation with otherwise normal vitals and WBC count as well as a fairly unremarkable urinalysis.  However, she has a history of pyelonephritis in pregnancy and reported that the current symptoms felt very similar.  Thus a urine culture was sent and she was started on ceftriaxone for treatment of possible pyelonephritis.  She has remained afebrile and her symptoms have improved.  Her urine culture returned negative however, ruling out UTI / pyelonephritis.  We discussed with the patient that while we do not know the etiology of her presenting symptoms, she has improved.  In addition we do not believe that continued therapy with antibiotics is indicated.  We reviewed that we feel she is safe for discharge home today.  She has a follow up appointment in clinic on Tuesday 12/24.  She is planned for induction of labor on 12/26 and desires postpartum BTL vs salpingectomy.  We recommended that she discuss her back-up contraception plan with her OB provider in the office in case the tubal is not possible to complete during her postpartum stay.

## 2024-12-19 NOTE — DISCHARGE NOTE ANTEPARTUM - CARE PLAN
Principal Discharge DX:	Pregnancy with flank pain, antepartum  Assessment and plan of treatment:	You received ceftriaxone in the hospital for possible pyelonephritis. Urine culture was negative for infection. You can take Tylenol 975mg every 6 hours as needed for pain. Call your doctor if you have decreased fetal movement, painful contractions, leakage of fluid, or vaginal bleeding. Call your doctor if your pain worsens or if you have fever > 100.4F.   1

## 2024-12-19 NOTE — DISCHARGE NOTE ANTEPARTUM - CARE PROVIDER_API CALL
[facility],   [address] [place] [address] [address]  [place] [address]  Phone: [phone]  Fax: (   )    -  Scheduled Appointment: 12/24/2024

## 2024-12-19 NOTE — DISCHARGE NOTE ANTEPARTUM - PATIENT PORTAL LINK FT
You can access the FollowMyHealth Patient Portal offered by [facility] by registering at the following website: [email or web address]. By joining [employer]’s FollowMyHealth portal, you will also be able to view your health information using other applications (apps) compatible with our system.

## 2024-12-19 NOTE — DISCHARGE NOTE ANTEPARTUM - NS MD DC FALL RISK RISK
For information on Fall & Injury Prevention, visit: [email or web address] OR  [email or web address] OR  https://www.cdc.gov/steadi/patient.html

## 2024-12-19 NOTE — DISCHARGE NOTE ANTEPARTUM - HOSPITAL COURSE
Pt is a 35yo  admitted at 37w6d with flank pain with concern for possible pyelonephritis. Pt was treated with ceftriaxone and pain improved. Urine culture was negative. Pt was discharged in stable condition with normal vital signs.

## 2024-12-21 ENCOUNTER — NON-APPOINTMENT (OUTPATIENT)
Age: 34
End: 2024-12-21

## 2024-12-23 ENCOUNTER — APPOINTMENT (OUTPATIENT)
Dept: OBGYN | Facility: CLINIC | Age: 34
End: 2024-12-23
Payer: MEDICAID

## 2024-12-23 ENCOUNTER — OUTPATIENT (OUTPATIENT)
Dept: OUTPATIENT SERVICES | Facility: HOSPITAL | Age: 34
LOS: 1 days | End: 2024-12-23
Payer: MEDICAID

## 2024-12-23 VITALS — BODY MASS INDEX: 38.96 KG/M2 | WEIGHT: 213 LBS | DIASTOLIC BLOOD PRESSURE: 84 MMHG | SYSTOLIC BLOOD PRESSURE: 116 MMHG

## 2024-12-23 DIAGNOSIS — Z90.49 ACQUIRED ABSENCE OF OTHER SPECIFIED PARTS OF DIGESTIVE TRACT: Chronic | ICD-10-CM

## 2024-12-23 DIAGNOSIS — Z34.80 ENCOUNTER FOR SUPERVISION OF OTHER NORMAL PREGNANCY, UNSPECIFIED TRIMESTER: ICD-10-CM

## 2024-12-23 DIAGNOSIS — Z34.93 ENCOUNTER FOR SUPERVISION OF NORMAL PREGNANCY, UNSPECIFIED, THIRD TRIMESTER: ICD-10-CM

## 2024-12-23 DIAGNOSIS — Z34.90 ENCOUNTER FOR SUPERVISION OF NORMAL PREGNANCY, UNSPECIFIED, UNSPECIFIED TRIMESTER: ICD-10-CM

## 2024-12-23 PROBLEM — G43.909 MIGRAINE, UNSPECIFIED, NOT INTRACTABLE, WITHOUT STATUS MIGRAINOSUS: Chronic | Status: ACTIVE | Noted: 2024-12-17

## 2024-12-23 PROCEDURE — 81003 URINALYSIS AUTO W/O SCOPE: CPT

## 2024-12-23 PROCEDURE — G0463: CPT

## 2024-12-23 PROCEDURE — 99213 OFFICE O/P EST LOW 20 MIN: CPT | Mod: 25

## 2024-12-24 ENCOUNTER — APPOINTMENT (OUTPATIENT)
Dept: OBGYN | Facility: CLINIC | Age: 34
End: 2024-12-24

## 2024-12-26 ENCOUNTER — NON-APPOINTMENT (OUTPATIENT)
Age: 34
End: 2024-12-26

## 2024-12-27 ENCOUNTER — TRANSCRIPTION ENCOUNTER (OUTPATIENT)
Age: 34
End: 2024-12-27

## 2025-01-09 ENCOUNTER — NON-APPOINTMENT (OUTPATIENT)
Age: 35
End: 2025-01-09

## 2025-01-15 ENCOUNTER — NON-APPOINTMENT (OUTPATIENT)
Age: 35
End: 2025-01-15

## 2025-01-21 NOTE — ED ADULT NURSE NOTE - PSYCHOSOCIAL WDL
[Anterior rhinoscopy insufficient to account for symptoms] : anterior rhinoscopy insufficient to account for symptoms [None] : none [de-identified] : Caleb Hollins [de-identified] : Procedure: Bilateral nasal endoscopy (CPT 19947)   Indication: Anterior rhinoscopy was inadequate to evaluate pathology.  Left: Septum midline Moderate inferior turbinate hypertrophy  Middle meatus clear, without masses, polyps, or pus Sphenoethmoidal recess clear, without masses, polyps, or pus  Right:  Septum midline IT partially resected, well healed, no hemangioma  Middle meatus clear, without masses, polyps, or pus  Sphenoethmoidal recess clear, without masses, polyps, or pus  Alert and oriented x 3, normal mood and affect, no apparent risk to self or others.

## 2025-01-22 ENCOUNTER — NON-APPOINTMENT (OUTPATIENT)
Age: 35
End: 2025-01-22

## 2025-03-28 NOTE — OB RN TRIAGE NOTE - GRAVIDA, OB PROFILE
Detail Level: Detailed Quality 226: Preventive Care And Screening: Tobacco Use: Screening And Cessation Intervention: Patient screened for tobacco use and is an ex/non-smoker 5

## 2025-05-23 NOTE — ED PROVIDER NOTE - CHILD ABUSE FACILITY
Chief Complaint   Patient presents with    Blood Draw     Labs drawn via  by RN in lab.      Labs collected from venipuncture by RN.    Ashley Talamatnes RN     [facility]

## 2025-06-19 NOTE — ED ADULT TRIAGE NOTE - NS ED TRIAGE AVPU SCALE
Group Therapy Note    Date: 6/19/2025    Group Start Time: 1000  Group End Time: 1100  Group Topic: Relaxation    [facility] ACUTE [facility]    Lilly Hanley        Group Therapy Note    Attendees: 4       Patient's Goal:  To participate in relaxation activity    Notes:  Pt did not attend session  Discipline Responsible: Recreational Therapist      Signature:  LILLY HANLEY     Alert-The patient is alert, awake and responds to voice. The patient is oriented to time, place, and person. The triage nurse is able to obtain subjective information.

## 2025-06-30 NOTE — OB RN PATIENT PROFILE - PRETERM DELIVERIES, OB PROFILE
Tried to reach out to patient to get scheduled for a new patient appointment but phone number is not accepting calls at this time. 06/30/2025   0